# Patient Record
Sex: MALE | Race: WHITE | NOT HISPANIC OR LATINO | URBAN - METROPOLITAN AREA
[De-identification: names, ages, dates, MRNs, and addresses within clinical notes are randomized per-mention and may not be internally consistent; named-entity substitution may affect disease eponyms.]

---

## 2023-01-01 ENCOUNTER — APPOINTMENT (OUTPATIENT)
Dept: GENERAL RADIOLOGY | Facility: HOSPITAL | Age: 88
DRG: 444 | End: 2023-01-01
Payer: MEDICARE

## 2023-01-01 ENCOUNTER — ANESTHESIA EVENT (OUTPATIENT)
Dept: GASTROENTEROLOGY | Facility: HOSPITAL | Age: 88
DRG: 444 | End: 2023-01-01
Payer: MEDICARE

## 2023-01-01 ENCOUNTER — APPOINTMENT (OUTPATIENT)
Dept: CARDIOLOGY | Facility: HOSPITAL | Age: 88
DRG: 444 | End: 2023-01-01
Payer: MEDICARE

## 2023-01-01 ENCOUNTER — INPATIENT HOSPITAL (OUTPATIENT)
Dept: URBAN - METROPOLITAN AREA HOSPITAL 84 | Facility: HOSPITAL | Age: 88
End: 2023-01-01

## 2023-01-01 ENCOUNTER — APPOINTMENT (OUTPATIENT)
Dept: OTHER | Facility: HOSPITAL | Age: 88
DRG: 444 | End: 2023-01-01
Payer: MEDICARE

## 2023-01-01 ENCOUNTER — APPOINTMENT (OUTPATIENT)
Dept: MRI IMAGING | Facility: HOSPITAL | Age: 88
DRG: 444 | End: 2023-01-01
Payer: MEDICARE

## 2023-01-01 ENCOUNTER — APPOINTMENT (OUTPATIENT)
Dept: ULTRASOUND IMAGING | Facility: HOSPITAL | Age: 88
DRG: 444 | End: 2023-01-01
Payer: MEDICARE

## 2023-01-01 ENCOUNTER — ON CAMPUS - OUTPATIENT (OUTPATIENT)
Dept: URBAN - METROPOLITAN AREA HOSPITAL 85 | Facility: HOSPITAL | Age: 88
End: 2023-01-01

## 2023-01-01 ENCOUNTER — ANESTHESIA (OUTPATIENT)
Dept: GASTROENTEROLOGY | Facility: HOSPITAL | Age: 88
DRG: 444 | End: 2023-01-01
Payer: MEDICARE

## 2023-01-01 ENCOUNTER — HOSPITAL ENCOUNTER (INPATIENT)
Facility: HOSPITAL | Age: 88
LOS: 5 days | DRG: 444 | End: 2023-09-23
Attending: HOSPITALIST | Admitting: HOSPITALIST
Payer: MEDICARE

## 2023-01-01 VITALS
DIASTOLIC BLOOD PRESSURE: 74 MMHG | SYSTOLIC BLOOD PRESSURE: 134 MMHG | BODY MASS INDEX: 26.22 KG/M2 | OXYGEN SATURATION: 91 % | TEMPERATURE: 97.4 F | HEART RATE: 100 BPM | HEIGHT: 66 IN | WEIGHT: 163.14 LBS | RESPIRATION RATE: 17 BRPM

## 2023-01-01 DIAGNOSIS — R94.5 ABNORMAL RESULTS OF LIVER FUNCTION STUDIES: ICD-10-CM

## 2023-01-01 DIAGNOSIS — K44.9 DIAPHRAGMATIC HERNIA WITHOUT OBSTRUCTION OR GANGRENE: ICD-10-CM

## 2023-01-01 DIAGNOSIS — K80.50 CHOLEDOCHOLITHIASIS: Primary | ICD-10-CM

## 2023-01-01 DIAGNOSIS — K80.50 CALCULUS OF BILE DUCT WITHOUT CHOLANGITIS OR CHOLECYSTITIS W: ICD-10-CM

## 2023-01-01 DIAGNOSIS — R11.2 NAUSEA WITH VOMITING, UNSPECIFIED: ICD-10-CM

## 2023-01-01 DIAGNOSIS — R10.13 EPIGASTRIC PAIN: ICD-10-CM

## 2023-01-01 DIAGNOSIS — D64.9 ANEMIA, UNSPECIFIED: ICD-10-CM

## 2023-01-01 DIAGNOSIS — K85.90 ACUTE PANCREATITIS WITHOUT NECROSIS OR INFECTION, UNSPECIFIE: ICD-10-CM

## 2023-01-01 DIAGNOSIS — D53.9 NUTRITIONAL ANEMIA, UNSPECIFIED: ICD-10-CM

## 2023-01-01 DIAGNOSIS — G20 PARKINSON'S DISEASE: ICD-10-CM

## 2023-01-01 DIAGNOSIS — R74.01 ELEVATION OF LEVELS OF LIVER TRANSAMINASE LEVELS: ICD-10-CM

## 2023-01-01 DIAGNOSIS — K85.10 ACUTE BILIARY PANCREATITIS, UNSPECIFIED COMPLICATION STATUS: ICD-10-CM

## 2023-01-01 LAB
ADV 40+41 DNA STL QL NAA+NON-PROBE: NOT DETECTED
ALBUMIN SERPL ELPH-MCNC: 2.6 G/DL (ref 2.9–4.4)
ALBUMIN SERPL-MCNC: 2.3 G/DL (ref 3.5–5.2)
ALBUMIN SERPL-MCNC: 2.4 G/DL (ref 3.5–5.2)
ALBUMIN SERPL-MCNC: 2.6 G/DL (ref 3.5–5.2)
ALBUMIN SERPL-MCNC: 3.2 G/DL (ref 3.5–5.2)
ALBUMIN SERPL-MCNC: 3.3 G/DL (ref 3.5–5.2)
ALBUMIN SERPL-MCNC: 3.5 G/DL (ref 3.5–5.2)
ALBUMIN/GLOB SERPL: 0.7 G/DL
ALBUMIN/GLOB SERPL: 0.9 G/DL
ALBUMIN/GLOB SERPL: 0.9 {RATIO} (ref 0.7–1.7)
ALBUMIN/GLOB SERPL: 1.1 G/DL
ALBUMIN/GLOB SERPL: 1.4 G/DL
ALP SERPL-CCNC: 46 U/L (ref 39–117)
ALP SERPL-CCNC: 55 U/L (ref 39–117)
ALP SERPL-CCNC: 65 U/L (ref 39–117)
ALP SERPL-CCNC: 69 U/L (ref 39–117)
ALP SERPL-CCNC: 70 U/L (ref 39–117)
ALP SERPL-CCNC: 86 U/L (ref 39–117)
ALPHA1 GLOB SERPL ELPH-MCNC: 0.4 G/DL (ref 0–0.4)
ALPHA2 GLOB SERPL ELPH-MCNC: 0.9 G/DL (ref 0.4–1)
ALT SERPL W P-5'-P-CCNC: 102 U/L (ref 1–41)
ALT SERPL W P-5'-P-CCNC: 125 U/L (ref 1–41)
ALT SERPL W P-5'-P-CCNC: 41 U/L (ref 1–41)
ALT SERPL W P-5'-P-CCNC: 62 U/L (ref 1–41)
ALT SERPL W P-5'-P-CCNC: 77 U/L (ref 1–41)
ALT SERPL W P-5'-P-CCNC: 87 U/L (ref 1–41)
ANION GAP SERPL CALCULATED.3IONS-SCNC: 11 MMOL/L (ref 5–15)
ANION GAP SERPL CALCULATED.3IONS-SCNC: 12 MMOL/L (ref 5–15)
ANION GAP SERPL CALCULATED.3IONS-SCNC: 13 MMOL/L (ref 5–15)
ANION GAP SERPL CALCULATED.3IONS-SCNC: 15 MMOL/L (ref 5–15)
ANION GAP SERPL CALCULATED.3IONS-SCNC: 16 MMOL/L (ref 5–15)
ANION GAP SERPL CALCULATED.3IONS-SCNC: 20 MMOL/L (ref 5–15)
ANISOCYTOSIS BLD QL: ABNORMAL
ANISOCYTOSIS BLD QL: ABNORMAL
AST SERPL-CCNC: 16 U/L (ref 1–40)
AST SERPL-CCNC: 172 U/L (ref 1–40)
AST SERPL-CCNC: 177 U/L (ref 1–40)
AST SERPL-CCNC: 24 U/L (ref 1–40)
AST SERPL-CCNC: 48 U/L (ref 1–40)
AST SERPL-CCNC: 95 U/L (ref 1–40)
ASTRO TYP 1-8 RNA STL QL NAA+NON-PROBE: NOT DETECTED
B-GLOBULIN SERPL ELPH-MCNC: 0.7 G/DL (ref 0.7–1.3)
BACTERIA ISLT: NORMAL
BACTERIA SPEC AEROBE CULT: ABNORMAL
BACTERIA UR QL AUTO: ABNORMAL /HPF
BASOPHILS # BLD AUTO: 0 10*3/MM3 (ref 0–0.2)
BASOPHILS NFR BLD AUTO: 0.1 % (ref 0–1.5)
BILIRUB SERPL-MCNC: 0.3 MG/DL (ref 0–1.2)
BILIRUB SERPL-MCNC: 0.4 MG/DL (ref 0–1.2)
BILIRUB SERPL-MCNC: 0.4 MG/DL (ref 0–1.2)
BILIRUB SERPL-MCNC: 0.6 MG/DL (ref 0–1.2)
BILIRUB SERPL-MCNC: 0.7 MG/DL (ref 0–1.2)
BILIRUB SERPL-MCNC: 0.9 MG/DL (ref 0–1.2)
BILIRUB UR QL STRIP: NEGATIVE
BUN SERPL-MCNC: 47 MG/DL (ref 8–23)
BUN SERPL-MCNC: 48 MG/DL (ref 8–23)
BUN SERPL-MCNC: 61 MG/DL (ref 8–23)
BUN SERPL-MCNC: 72 MG/DL (ref 8–23)
BUN SERPL-MCNC: 75 MG/DL (ref 8–23)
BUN SERPL-MCNC: 85 MG/DL (ref 8–23)
BUN/CREAT SERPL: 28.2 (ref 7–25)
BUN/CREAT SERPL: 29.2 (ref 7–25)
BUN/CREAT SERPL: 30.9 (ref 7–25)
BUN/CREAT SERPL: 31.2 (ref 7–25)
BUN/CREAT SERPL: 34.4 (ref 7–25)
BUN/CREAT SERPL: 37.3 (ref 7–25)
BURR CELLS BLD QL SMEAR: ABNORMAL
C CAYETANENSIS DNA STL QL NAA+NON-PROBE: NOT DETECTED
C COLI+JEJ+UPSA DNA STL QL NAA+NON-PROBE: NOT DETECTED
C DIFF GDH + TOXINS A+B STL QL IA.RAPID: NEGATIVE
C DIFF GDH + TOXINS A+B STL QL IA.RAPID: NEGATIVE
CA-I SERPL ISE-MCNC: 1.14 MMOL/L (ref 1.2–1.3)
CA-I SERPL ISE-MCNC: 1.2 MMOL/L (ref 1.2–1.3)
CALCIUM SPEC-SCNC: 7.4 MG/DL (ref 8.6–10.5)
CALCIUM SPEC-SCNC: 7.5 MG/DL (ref 8.6–10.5)
CALCIUM SPEC-SCNC: 7.7 MG/DL (ref 8.6–10.5)
CALCIUM SPEC-SCNC: 8.4 MG/DL (ref 8.6–10.5)
CALCIUM SPEC-SCNC: 8.9 MG/DL (ref 8.6–10.5)
CALCIUM SPEC-SCNC: 9 MG/DL (ref 8.6–10.5)
CHLORIDE SERPL-SCNC: 101 MMOL/L (ref 98–107)
CHLORIDE SERPL-SCNC: 110 MMOL/L (ref 98–107)
CHLORIDE SERPL-SCNC: 111 MMOL/L (ref 98–107)
CHLORIDE SERPL-SCNC: 118 MMOL/L (ref 98–107)
CHLORIDE SERPL-SCNC: 119 MMOL/L (ref 98–107)
CHLORIDE SERPL-SCNC: 120 MMOL/L (ref 98–107)
CK SERPL-CCNC: 248 U/L (ref 20–200)
CLARITY UR: ABNORMAL
CO2 SERPL-SCNC: 18 MMOL/L (ref 22–29)
CO2 SERPL-SCNC: 19 MMOL/L (ref 22–29)
CO2 SERPL-SCNC: 20 MMOL/L (ref 22–29)
CO2 SERPL-SCNC: 24 MMOL/L (ref 22–29)
COLOR UR: ABNORMAL
CREAT SERPL-MCNC: 1.26 MG/DL (ref 0.76–1.27)
CREAT SERPL-MCNC: 1.54 MG/DL (ref 0.76–1.27)
CREAT SERPL-MCNC: 2.09 MG/DL (ref 0.76–1.27)
CREAT SERPL-MCNC: 2.09 MG/DL (ref 0.76–1.27)
CREAT SERPL-MCNC: 2.66 MG/DL (ref 0.76–1.27)
CREAT SERPL-MCNC: 2.75 MG/DL (ref 0.76–1.27)
CRP SERPL-MCNC: 22.22 MG/DL (ref 0–0.5)
CRP SERPL-MCNC: 34.33 MG/DL (ref 0–0.5)
CRP SERPL-MCNC: 5.55 MG/DL (ref 0–0.5)
CRYPTOSP DNA STL QL NAA+NON-PROBE: NOT DETECTED
D-LACTATE SERPL-SCNC: 1.6 MMOL/L (ref 0.5–2)
D-LACTATE SERPL-SCNC: 1.6 MMOL/L (ref 0.5–2)
D-LACTATE SERPL-SCNC: 2.2 MMOL/L (ref 0.5–2)
D-LACTATE SERPL-SCNC: 2.2 MMOL/L (ref 0.5–2)
D-LACTATE SERPL-SCNC: 2.3 MMOL/L (ref 0.5–2)
DEPRECATED RDW RBC AUTO: 53.8 FL (ref 37–54)
DEPRECATED RDW RBC AUTO: 54.3 FL (ref 37–54)
DEPRECATED RDW RBC AUTO: 54.7 FL (ref 37–54)
DEPRECATED RDW RBC AUTO: 56.9 FL (ref 37–54)
DEPRECATED RDW RBC AUTO: 57.3 FL (ref 37–54)
DEPRECATED RDW RBC AUTO: 58.6 FL (ref 37–54)
DOHLE BODIES: PRESENT
E HISTOLYT DNA STL QL NAA+NON-PROBE: NOT DETECTED
EAEC PAA PLAS AGGR+AATA ST NAA+NON-PRB: NOT DETECTED
EC STX1+STX2 GENES STL QL NAA+NON-PROBE: NOT DETECTED
EGFRCR SERPLBLD CKD-EPI 2021: 21.5 ML/MIN/1.73
EGFRCR SERPLBLD CKD-EPI 2021: 22.4 ML/MIN/1.73
EGFRCR SERPLBLD CKD-EPI 2021: 29.9 ML/MIN/1.73
EGFRCR SERPLBLD CKD-EPI 2021: 29.9 ML/MIN/1.73
EGFRCR SERPLBLD CKD-EPI 2021: 43.1 ML/MIN/1.73
EGFRCR SERPLBLD CKD-EPI 2021: 54.9 ML/MIN/1.73
EOSINOPHIL # BLD AUTO: 0 10*3/MM3 (ref 0–0.4)
EOSINOPHIL NFR BLD AUTO: 0 % (ref 0.3–6.2)
EOSINOPHIL NFR BLD AUTO: 0 % (ref 0.3–6.2)
EOSINOPHIL NFR BLD AUTO: 0.1 % (ref 0.3–6.2)
EPEC EAE GENE STL QL NAA+NON-PROBE: NOT DETECTED
ERYTHROCYTE [DISTWIDTH] IN BLOOD BY AUTOMATED COUNT: 14.1 % (ref 12.3–15.4)
ERYTHROCYTE [DISTWIDTH] IN BLOOD BY AUTOMATED COUNT: 14.7 % (ref 12.3–15.4)
ERYTHROCYTE [DISTWIDTH] IN BLOOD BY AUTOMATED COUNT: 14.9 % (ref 12.3–15.4)
ERYTHROCYTE [DISTWIDTH] IN BLOOD BY AUTOMATED COUNT: 15.1 % (ref 12.3–15.4)
ERYTHROCYTE [DISTWIDTH] IN BLOOD BY AUTOMATED COUNT: 15.2 % (ref 12.3–15.4)
ERYTHROCYTE [DISTWIDTH] IN BLOOD BY AUTOMATED COUNT: 15.2 % (ref 12.3–15.4)
ERYTHROCYTE [SEDIMENTATION RATE] IN BLOOD: 14 MM/HR (ref 0–20)
ETEC LTA+ST1A+ST1B TOX ST NAA+NON-PROBE: NOT DETECTED
FERRITIN SERPL-MCNC: 171.7 NG/ML (ref 30–400)
FOLATE SERPL-MCNC: >20 NG/ML (ref 4.78–24.2)
G LAMBLIA DNA STL QL NAA+NON-PROBE: NOT DETECTED
GAMMA GLOB SERPL ELPH-MCNC: 0.8 G/DL (ref 0.4–1.8)
GLOBULIN SER CALC-MCNC: 2.8 G/DL (ref 2.2–3.9)
GLOBULIN UR ELPH-MCNC: 2.3 GM/DL
GLOBULIN UR ELPH-MCNC: 2.5 GM/DL
GLOBULIN UR ELPH-MCNC: 2.8 GM/DL
GLOBULIN UR ELPH-MCNC: 3.1 GM/DL
GLUCOSE BLDC GLUCOMTR-MCNC: 129 MG/DL (ref 70–105)
GLUCOSE BLDC GLUCOMTR-MCNC: 179 MG/DL (ref 70–105)
GLUCOSE BLDC GLUCOMTR-MCNC: 210 MG/DL (ref 70–105)
GLUCOSE BLDC GLUCOMTR-MCNC: 213 MG/DL (ref 70–105)
GLUCOSE BLDC GLUCOMTR-MCNC: 217 MG/DL (ref 70–105)
GLUCOSE BLDC GLUCOMTR-MCNC: 234 MG/DL (ref 70–105)
GLUCOSE BLDC GLUCOMTR-MCNC: 254 MG/DL (ref 70–105)
GLUCOSE SERPL-MCNC: 127 MG/DL (ref 65–99)
GLUCOSE SERPL-MCNC: 159 MG/DL (ref 65–99)
GLUCOSE SERPL-MCNC: 165 MG/DL (ref 65–99)
GLUCOSE SERPL-MCNC: 229 MG/DL (ref 65–99)
GLUCOSE SERPL-MCNC: 237 MG/DL (ref 65–99)
GLUCOSE SERPL-MCNC: 253 MG/DL (ref 65–99)
GLUCOSE UR STRIP-MCNC: ABNORMAL MG/DL
HCT VFR BLD AUTO: 26.2 % (ref 37.5–51)
HCT VFR BLD AUTO: 27.4 % (ref 37.5–51)
HCT VFR BLD AUTO: 30.8 % (ref 37.5–51)
HCT VFR BLD AUTO: 31.4 % (ref 37.5–51)
HCT VFR BLD AUTO: 32.3 % (ref 37.5–51)
HCT VFR BLD AUTO: 37.6 % (ref 37.5–51)
HGB BLD-MCNC: 10 G/DL (ref 13–17.7)
HGB BLD-MCNC: 10.4 G/DL (ref 13–17.7)
HGB BLD-MCNC: 10.9 G/DL (ref 13–17.7)
HGB BLD-MCNC: 12.6 G/DL (ref 13–17.7)
HGB BLD-MCNC: 8.7 G/DL (ref 13–17.7)
HGB BLD-MCNC: 9.1 G/DL (ref 13–17.7)
HGB UR QL STRIP.AUTO: ABNORMAL
HYALINE CASTS UR QL AUTO: ABNORMAL /LPF
INR PPP: 1.05 (ref 0.93–1.1)
INR PPP: 1.41 (ref 0.93–1.1)
IRON 24H UR-MRATE: 12 MCG/DL (ref 59–158)
IRON SATN MFR SERPL: 6 % (ref 20–50)
KETONES UR QL STRIP: NEGATIVE
LAB AP CASE REPORT: NORMAL
LABORATORY COMMENT REPORT: ABNORMAL
LARGE PLATELETS: ABNORMAL
LARGE PLATELETS: ABNORMAL
LEUKOCYTE ESTERASE UR QL STRIP.AUTO: ABNORMAL
LIPASE SERPL-CCNC: 106 U/L (ref 13–60)
LIPASE SERPL-CCNC: 25 U/L (ref 13–60)
LIPASE SERPL-CCNC: 474 U/L (ref 13–60)
LYMPHOCYTES # BLD AUTO: 0.1 10*3/MM3 (ref 0.7–3.1)
LYMPHOCYTES # BLD AUTO: 0.2 10*3/MM3 (ref 0.7–3.1)
LYMPHOCYTES # BLD AUTO: 0.4 10*3/MM3 (ref 0.7–3.1)
LYMPHOCYTES # BLD MANUAL: 0.18 10*3/MM3 (ref 0.7–3.1)
LYMPHOCYTES # BLD MANUAL: 0.21 10*3/MM3 (ref 0.7–3.1)
LYMPHOCYTES # BLD MANUAL: 0.71 10*3/MM3 (ref 0.7–3.1)
LYMPHOCYTES NFR BLD AUTO: 2.4 % (ref 19.6–45.3)
LYMPHOCYTES NFR BLD AUTO: 2.6 % (ref 19.6–45.3)
LYMPHOCYTES NFR BLD AUTO: 3.2 % (ref 19.6–45.3)
LYMPHOCYTES NFR BLD MANUAL: 2 % (ref 5–12)
LYMPHOCYTES NFR BLD MANUAL: 6 % (ref 5–12)
M PROTEIN SERPL ELPH-MCNC: ABNORMAL G/DL
MACROCYTES BLD QL SMEAR: ABNORMAL
MAGNESIUM SERPL-MCNC: 2.2 MG/DL (ref 1.6–2.4)
MAGNESIUM SERPL-MCNC: 2.7 MG/DL (ref 1.6–2.4)
MCH RBC QN AUTO: 33.5 PG (ref 26.6–33)
MCH RBC QN AUTO: 33.7 PG (ref 26.6–33)
MCH RBC QN AUTO: 34 PG (ref 26.6–33)
MCH RBC QN AUTO: 34.3 PG (ref 26.6–33)
MCH RBC QN AUTO: 34.9 PG (ref 26.6–33)
MCH RBC QN AUTO: 35.2 PG (ref 26.6–33)
MCHC RBC AUTO-ENTMCNC: 32.5 G/DL (ref 31.5–35.7)
MCHC RBC AUTO-ENTMCNC: 33 G/DL (ref 31.5–35.7)
MCHC RBC AUTO-ENTMCNC: 33.1 G/DL (ref 31.5–35.7)
MCHC RBC AUTO-ENTMCNC: 33.3 G/DL (ref 31.5–35.7)
MCHC RBC AUTO-ENTMCNC: 33.5 G/DL (ref 31.5–35.7)
MCHC RBC AUTO-ENTMCNC: 33.7 G/DL (ref 31.5–35.7)
MCV RBC AUTO: 101.3 FL (ref 79–97)
MCV RBC AUTO: 102.1 FL (ref 79–97)
MCV RBC AUTO: 103.5 FL (ref 79–97)
MCV RBC AUTO: 104.1 FL (ref 79–97)
MCV RBC AUTO: 104.2 FL (ref 79–97)
MCV RBC AUTO: 104.3 FL (ref 79–97)
METAMYELOCYTES NFR BLD MANUAL: 1 % (ref 0–0)
METAMYELOCYTES NFR BLD MANUAL: 19 % (ref 0–0)
MONOCYTES # BLD AUTO: 0.1 10*3/MM3 (ref 0.1–0.9)
MONOCYTES # BLD AUTO: 0.4 10*3/MM3 (ref 0.1–0.9)
MONOCYTES # BLD AUTO: 0.4 10*3/MM3 (ref 0.1–0.9)
MONOCYTES # BLD: 0.18 10*3/MM3 (ref 0.1–0.9)
MONOCYTES # BLD: 0.61 10*3/MM3 (ref 0.1–0.9)
MONOCYTES NFR BLD AUTO: 1.9 % (ref 5–12)
MONOCYTES NFR BLD AUTO: 3.6 % (ref 5–12)
MONOCYTES NFR BLD AUTO: 5.4 % (ref 5–12)
MYELOCYTES NFR BLD MANUAL: 1 % (ref 0–0)
MYELOCYTES NFR BLD MANUAL: 5 % (ref 0–0)
NEUTROPHILS # BLD AUTO: 7.84 10*3/MM3 (ref 1.7–7)
NEUTROPHILS # BLD AUTO: 8.64 10*3/MM3 (ref 1.7–7)
NEUTROPHILS # BLD AUTO: 8.67 10*3/MM3 (ref 1.7–7)
NEUTROPHILS NFR BLD AUTO: 10.4 10*3/MM3 (ref 1.7–7)
NEUTROPHILS NFR BLD AUTO: 2.6 10*3/MM3 (ref 1.7–7)
NEUTROPHILS NFR BLD AUTO: 7.6 10*3/MM3 (ref 1.7–7)
NEUTROPHILS NFR BLD AUTO: 91.9 % (ref 42.7–76)
NEUTROPHILS NFR BLD AUTO: 93 % (ref 42.7–76)
NEUTROPHILS NFR BLD AUTO: 95.6 % (ref 42.7–76)
NEUTROPHILS NFR BLD MANUAL: 40 % (ref 42.7–76)
NEUTROPHILS NFR BLD MANUAL: 60 % (ref 42.7–76)
NEUTROPHILS NFR BLD MANUAL: 93 % (ref 42.7–76)
NEUTS BAND NFR BLD MANUAL: 25 % (ref 0–5)
NEUTS BAND NFR BLD MANUAL: 3 % (ref 0–5)
NEUTS BAND NFR BLD MANUAL: 34 % (ref 0–5)
NEUTS VAC BLD QL SMEAR: ABNORMAL
NITRITE UR QL STRIP: POSITIVE
NOROVIRUS GI+II RNA STL QL NAA+NON-PROBE: NOT DETECTED
NRBC BLD AUTO-RTO: 0.1 /100 WBC (ref 0–0.2)
NRBC BLD AUTO-RTO: 0.1 /100 WBC (ref 0–0.2)
NRBC BLD AUTO-RTO: 0.2 /100 WBC (ref 0–0.2)
OVALOCYTES BLD QL SMEAR: ABNORMAL
P SHIGELLOIDES DNA STL QL NAA+NON-PROBE: NOT DETECTED
PATH REPORT.FINAL DX SPEC: NORMAL
PATHOLOGY REVIEW: YES
PH UR STRIP.AUTO: <=5 [PH] (ref 5–8)
PHOSPHATE SERPL-MCNC: 2 MG/DL (ref 2.5–4.5)
PHOSPHATE SERPL-MCNC: 3.3 MG/DL (ref 2.5–4.5)
PLAT MORPH BLD: NORMAL
PLATELET # BLD AUTO: 108 10*3/MM3 (ref 140–450)
PLATELET # BLD AUTO: 150 10*3/MM3 (ref 140–450)
PLATELET # BLD AUTO: 68 10*3/MM3 (ref 140–450)
PLATELET # BLD AUTO: 69 10*3/MM3 (ref 140–450)
PLATELET # BLD AUTO: 76 10*3/MM3 (ref 140–450)
PLATELET # BLD AUTO: 95 10*3/MM3 (ref 140–450)
PMV BLD AUTO: 10.2 FL (ref 6–12)
PMV BLD AUTO: 10.5 FL (ref 6–12)
PMV BLD AUTO: 8 FL (ref 6–12)
PMV BLD AUTO: 8.8 FL (ref 6–12)
PMV BLD AUTO: 9.4 FL (ref 6–12)
PMV BLD AUTO: 9.8 FL (ref 6–12)
POIKILOCYTOSIS BLD QL SMEAR: ABNORMAL
POIKILOCYTOSIS BLD QL SMEAR: ABNORMAL
POLYCHROMASIA BLD QL SMEAR: ABNORMAL
POTASSIUM SERPL-SCNC: 2.9 MMOL/L (ref 3.5–5.2)
POTASSIUM SERPL-SCNC: 3.5 MMOL/L (ref 3.5–5.2)
POTASSIUM SERPL-SCNC: 3.7 MMOL/L (ref 3.5–5.2)
POTASSIUM SERPL-SCNC: 3.9 MMOL/L (ref 3.5–5.2)
POTASSIUM SERPL-SCNC: 4.4 MMOL/L (ref 3.5–5.2)
POTASSIUM SERPL-SCNC: 4.7 MMOL/L (ref 3.5–5.2)
PROT PATTERN SERPL ELPH-IMP: ABNORMAL
PROT SERPL-MCNC: 4.9 G/DL (ref 6–8.5)
PROT SERPL-MCNC: 5.2 G/DL (ref 6–8.5)
PROT SERPL-MCNC: 5.4 G/DL (ref 6–8.5)
PROT SERPL-MCNC: 5.4 G/DL (ref 6–8.5)
PROT SERPL-MCNC: 5.5 G/DL (ref 6–8.5)
PROT SERPL-MCNC: 5.6 G/DL (ref 6–8.5)
PROT SERPL-MCNC: 6 G/DL (ref 6–8.5)
PROT UR QL STRIP: ABNORMAL
PROTHROMBIN TIME: 11.2 SECONDS (ref 9.6–11.7)
PROTHROMBIN TIME: 14.8 SECONDS (ref 9.6–11.7)
PTH-INTACT SERPL-MCNC: 116.6 PG/ML (ref 15–65)
QT INTERVAL: 341 MS
QT INTERVAL: 375 MS
QT INTERVAL: 388 MS
QT INTERVAL: 403 MS
QT INTERVAL: 472 MS
QTC INTERVAL: 475 MS
QTC INTERVAL: 487 MS
QTC INTERVAL: 492 MS
QTC INTERVAL: 504 MS
QTC INTERVAL: 518 MS
RBC # BLD AUTO: 2.57 10*6/MM3 (ref 4.14–5.8)
RBC # BLD AUTO: 2.7 10*6/MM3 (ref 4.14–5.8)
RBC # BLD AUTO: 2.97 10*6/MM3 (ref 4.14–5.8)
RBC # BLD AUTO: 3.02 10*6/MM3 (ref 4.14–5.8)
RBC # BLD AUTO: 3.1 10*6/MM3 (ref 4.14–5.8)
RBC # BLD AUTO: 3.6 10*6/MM3 (ref 4.14–5.8)
RBC # UR STRIP: ABNORMAL /HPF
RBC MORPH BLD: NORMAL
REF LAB TEST METHOD: ABNORMAL
RVA RNA STL QL NAA+NON-PROBE: NOT DETECTED
S ENT+BONG DNA STL QL NAA+NON-PROBE: NOT DETECTED
SAPO I+II+IV+V RNA STL QL NAA+NON-PROBE: NOT DETECTED
SCAN SLIDE: NORMAL
SHIGELLA SP+EIEC IPAH ST NAA+NON-PROBE: NOT DETECTED
SODIUM SERPL-SCNC: 140 MMOL/L (ref 136–145)
SODIUM SERPL-SCNC: 143 MMOL/L (ref 136–145)
SODIUM SERPL-SCNC: 145 MMOL/L (ref 136–145)
SODIUM SERPL-SCNC: 149 MMOL/L (ref 136–145)
SODIUM SERPL-SCNC: 153 MMOL/L (ref 136–145)
SODIUM SERPL-SCNC: 155 MMOL/L (ref 136–145)
SODIUM UR-SCNC: 175 MMOL/L
SP GR UR STRIP: 1.04 (ref 1–1.03)
SQUAMOUS #/AREA URNS HPF: ABNORMAL /HPF
TIBC SERPL-MCNC: 215 MCG/DL (ref 298–536)
TOXIC GRANULATION: ABNORMAL
TRANSFERRIN SERPL-MCNC: 144 MG/DL (ref 200–360)
TSH SERPL DL<=0.05 MIU/L-ACNC: 3.05 UIU/ML (ref 0.27–4.2)
URATE SERPL-MCNC: 7.1 MG/DL (ref 3.4–7)
UROBILINOGEN UR QL STRIP: ABNORMAL
V CHOL+PARA+VUL DNA STL QL NAA+NON-PROBE: NOT DETECTED
V CHOLERAE DNA STL QL NAA+NON-PROBE: NOT DETECTED
VARIANT LYMPHS NFR BLD MANUAL: 0 % (ref 19.6–45.3)
VARIANT LYMPHS NFR BLD MANUAL: 2 % (ref 0–5)
VARIANT LYMPHS NFR BLD MANUAL: 2 % (ref 19.6–45.3)
VARIANT LYMPHS NFR BLD MANUAL: 7 % (ref 19.6–45.3)
VIT B12 BLD-MCNC: >2000 PG/ML (ref 211–946)
WBC # UR STRIP: ABNORMAL /HPF
WBC MORPH BLD: NORMAL
WBC MORPH BLD: NORMAL
WBC NRBC COR # BLD: 10.2 10*3/MM3 (ref 3.4–10.8)
WBC NRBC COR # BLD: 10.6 10*3/MM3 (ref 3.4–10.8)
WBC NRBC COR # BLD: 11.2 10*3/MM3 (ref 3.4–10.8)
WBC NRBC COR # BLD: 2.8 10*3/MM3 (ref 3.4–10.8)
WBC NRBC COR # BLD: 8.2 10*3/MM3 (ref 3.4–10.8)
WBC NRBC COR # BLD: 9 10*3/MM3 (ref 3.4–10.8)
Y ENTEROCOL DNA STL QL NAA+NON-PROBE: NOT DETECTED

## 2023-01-01 PROCEDURE — 99232 SBSQ HOSP IP/OBS MODERATE 35: CPT | Performed by: SURGERY

## 2023-01-01 PROCEDURE — C2625 STENT, NON-COR, TEM W/DEL SY: HCPCS | Performed by: INTERNAL MEDICINE

## 2023-01-01 PROCEDURE — 93005 ELECTROCARDIOGRAM TRACING: CPT | Performed by: HOSPITALIST

## 2023-01-01 PROCEDURE — C1769 GUIDE WIRE: HCPCS | Performed by: INTERNAL MEDICINE

## 2023-01-01 PROCEDURE — 99205 OFFICE O/P NEW HI 60 MIN: CPT | Performed by: INTERNAL MEDICINE

## 2023-01-01 PROCEDURE — 86140 C-REACTIVE PROTEIN: CPT | Performed by: NURSE PRACTITIONER

## 2023-01-01 PROCEDURE — 80053 COMPREHEN METABOLIC PANEL: CPT | Performed by: INTERNAL MEDICINE

## 2023-01-01 PROCEDURE — 82948 REAGENT STRIP/BLOOD GLUCOSE: CPT

## 2023-01-01 PROCEDURE — 25010000002 METRONIDAZOLE 500 MG/100ML SOLUTION: Performed by: INTERNAL MEDICINE

## 2023-01-01 PROCEDURE — 25010000002 SUGAMMADEX 200 MG/2ML SOLUTION: Performed by: NURSE ANESTHETIST, CERTIFIED REGISTERED

## 2023-01-01 PROCEDURE — 25010000002 PIPERACILLIN SOD-TAZOBACTAM PER 1 G: Performed by: NURSE PRACTITIONER

## 2023-01-01 PROCEDURE — 25010000002 CEFTRIAXONE PER 250 MG: Performed by: HOSPITALIST

## 2023-01-01 PROCEDURE — 87186 SC STD MICRODIL/AGAR DIL: CPT | Performed by: NURSE PRACTITIONER

## 2023-01-01 PROCEDURE — 99222 1ST HOSP IP/OBS MODERATE 55: CPT | Mod: FS,25 | Performed by: NURSE PRACTITIONER

## 2023-01-01 PROCEDURE — 25010000002 AMIODARONE IN DEXTROSE 5% 360-4.14 MG/200ML-% SOLUTION: Performed by: INTERNAL MEDICINE

## 2023-01-01 PROCEDURE — 25010000002 FUROSEMIDE PER 20 MG: Performed by: HOSPITALIST

## 2023-01-01 PROCEDURE — 25010000002 GLUCAGON (RDNA) PER 1 MG: Performed by: NURSE ANESTHETIST, CERTIFIED REGISTERED

## 2023-01-01 PROCEDURE — 25010000002 CALCIUM GLUCONATE-NACL 1-0.675 GM/50ML-% SOLUTION: Performed by: INTERNAL MEDICINE

## 2023-01-01 PROCEDURE — 93010 ELECTROCARDIOGRAM REPORT: CPT | Performed by: INTERNAL MEDICINE

## 2023-01-01 PROCEDURE — 85025 COMPLETE CBC W/AUTO DIFF WBC: CPT | Performed by: NURSE PRACTITIONER

## 2023-01-01 PROCEDURE — 25010000002 PIPERACILLIN SOD-TAZOBACTAM PER 1 G: Performed by: INTERNAL MEDICINE

## 2023-01-01 PROCEDURE — 83540 ASSAY OF IRON: CPT | Performed by: INTERNAL MEDICINE

## 2023-01-01 PROCEDURE — 83735 ASSAY OF MAGNESIUM: CPT | Performed by: INTERNAL MEDICINE

## 2023-01-01 PROCEDURE — 94799 UNLISTED PULMONARY SVC/PX: CPT

## 2023-01-01 PROCEDURE — 0DJ08ZZ INSPECTION OF UPPER INTESTINAL TRACT, VIA NATURAL OR ARTIFICIAL OPENING ENDOSCOPIC: ICD-10-PCS | Performed by: INTERNAL MEDICINE

## 2023-01-01 PROCEDURE — 25810000003 DEXTROSE 5 % WITH KCL 20 MEQ 20 MEQ/L SOLUTION: Performed by: INTERNAL MEDICINE

## 2023-01-01 PROCEDURE — 99222 1ST HOSP IP/OBS MODERATE 55: CPT | Mod: 25,FS | Performed by: NURSE PRACTITIONER

## 2023-01-01 PROCEDURE — 43262 ENDO CHOLANGIOPANCREATOGRAPH: CPT | Mod: 59 | Performed by: INTERNAL MEDICINE

## 2023-01-01 PROCEDURE — 82607 VITAMIN B-12: CPT | Performed by: INTERNAL MEDICINE

## 2023-01-01 PROCEDURE — 87507 IADNA-DNA/RNA PROBE TQ 12-25: CPT | Performed by: NURSE PRACTITIONER

## 2023-01-01 PROCEDURE — 84443 ASSAY THYROID STIM HORMONE: CPT | Performed by: INTERNAL MEDICINE

## 2023-01-01 PROCEDURE — 83605 ASSAY OF LACTIC ACID: CPT | Performed by: INTERNAL MEDICINE

## 2023-01-01 PROCEDURE — 63710000001 INSULIN LISPRO (HUMAN) PER 5 UNITS: Performed by: INTERNAL MEDICINE

## 2023-01-01 PROCEDURE — 99232 SBSQ HOSP IP/OBS MODERATE 35: CPT | Mod: FS | Performed by: NURSE PRACTITIONER

## 2023-01-01 PROCEDURE — 25010000002 HALOPERIDOL LACTATE PER 5 MG: Performed by: STUDENT IN AN ORGANIZED HEALTH CARE EDUCATION/TRAINING PROGRAM

## 2023-01-01 PROCEDURE — 25010000002 PROPOFOL 200 MG/20ML EMULSION: Performed by: NURSE ANESTHETIST, CERTIFIED REGISTERED

## 2023-01-01 PROCEDURE — 80053 COMPREHEN METABOLIC PANEL: CPT | Performed by: NURSE PRACTITIONER

## 2023-01-01 PROCEDURE — 93005 ELECTROCARDIOGRAM TRACING: CPT | Performed by: STUDENT IN AN ORGANIZED HEALTH CARE EDUCATION/TRAINING PROGRAM

## 2023-01-01 PROCEDURE — 76775 US EXAM ABDO BACK WALL LIM: CPT

## 2023-01-01 PROCEDURE — 25010000002 ALBUMIN HUMAN 25% PER 50 ML: Performed by: INTERNAL MEDICINE

## 2023-01-01 PROCEDURE — 25010000002 HYDROMORPHONE 1 MG/ML SOLUTION: Performed by: INTERNAL MEDICINE

## 2023-01-01 PROCEDURE — 83690 ASSAY OF LIPASE: CPT | Performed by: NURSE PRACTITIONER

## 2023-01-01 PROCEDURE — 71046 X-RAY EXAM CHEST 2 VIEWS: CPT

## 2023-01-01 PROCEDURE — 83690 ASSAY OF LIPASE: CPT | Performed by: INTERNAL MEDICINE

## 2023-01-01 PROCEDURE — 74018 RADEX ABDOMEN 1 VIEW: CPT

## 2023-01-01 PROCEDURE — 25010000002 POTASSIUM CHLORIDE PER 2 MEQ OF POTASSIUM: Performed by: INTERNAL MEDICINE

## 2023-01-01 PROCEDURE — 84165 PROTEIN E-PHORESIS SERUM: CPT | Performed by: INTERNAL MEDICINE

## 2023-01-01 PROCEDURE — 87086 URINE CULTURE/COLONY COUNT: CPT | Performed by: NURSE PRACTITIONER

## 2023-01-01 PROCEDURE — 81001 URINALYSIS AUTO W/SCOPE: CPT | Performed by: NURSE PRACTITIONER

## 2023-01-01 PROCEDURE — 84466 ASSAY OF TRANSFERRIN: CPT | Performed by: INTERNAL MEDICINE

## 2023-01-01 PROCEDURE — 74181 MRI ABDOMEN W/O CONTRAST: CPT

## 2023-01-01 PROCEDURE — 85025 COMPLETE CBC W/AUTO DIFF WBC: CPT | Performed by: INTERNAL MEDICINE

## 2023-01-01 PROCEDURE — 25010000002 METRONIDAZOLE 500 MG/100ML SOLUTION: Performed by: HOSPITALIST

## 2023-01-01 PROCEDURE — 84300 ASSAY OF URINE SODIUM: CPT | Performed by: INTERNAL MEDICINE

## 2023-01-01 PROCEDURE — 43274 ERCP DUCT STENT PLACEMENT: CPT | Performed by: INTERNAL MEDICINE

## 2023-01-01 PROCEDURE — 82728 ASSAY OF FERRITIN: CPT | Performed by: INTERNAL MEDICINE

## 2023-01-01 PROCEDURE — 97162 PT EVAL MOD COMPLEX 30 MIN: CPT

## 2023-01-01 PROCEDURE — 25010000002 AMIODARONE IN DEXTROSE 5% 360-4.14 MG/200ML-% SOLUTION: Performed by: NURSE PRACTITIONER

## 2023-01-01 PROCEDURE — 99233 SBSQ HOSP IP/OBS HIGH 50: CPT | Performed by: INTERNAL MEDICINE

## 2023-01-01 PROCEDURE — 25010000002 ONDANSETRON PER 1 MG: Performed by: NURSE ANESTHETIST, CERTIFIED REGISTERED

## 2023-01-01 PROCEDURE — 87077 CULTURE AEROBIC IDENTIFY: CPT | Performed by: NURSE PRACTITIONER

## 2023-01-01 PROCEDURE — 82330 ASSAY OF CALCIUM: CPT | Performed by: INTERNAL MEDICINE

## 2023-01-01 PROCEDURE — 25010000002 HYDROMORPHONE 1 MG/ML SOLUTION: Performed by: NURSE PRACTITIONER

## 2023-01-01 PROCEDURE — 87324 CLOSTRIDIUM AG IA: CPT | Performed by: NURSE PRACTITIONER

## 2023-01-01 PROCEDURE — 99232 SBSQ HOSP IP/OBS MODERATE 35: CPT | Performed by: NURSE PRACTITIONER

## 2023-01-01 PROCEDURE — 0FC98ZZ EXTIRPATION OF MATTER FROM COMMON BILE DUCT, VIA NATURAL OR ARTIFICIAL OPENING ENDOSCOPIC: ICD-10-PCS | Performed by: INTERNAL MEDICINE

## 2023-01-01 PROCEDURE — 83970 ASSAY OF PARATHORMONE: CPT | Performed by: INTERNAL MEDICINE

## 2023-01-01 PROCEDURE — 25010000002 AMIODARONE IN DEXTROSE 5% 150-4.21 MG/100ML-% SOLUTION: Performed by: INTERNAL MEDICINE

## 2023-01-01 PROCEDURE — 93005 ELECTROCARDIOGRAM TRACING: CPT | Performed by: INTERNAL MEDICINE

## 2023-01-01 PROCEDURE — 85610 PROTHROMBIN TIME: CPT | Performed by: NURSE PRACTITIONER

## 2023-01-01 PROCEDURE — 82550 ASSAY OF CK (CPK): CPT | Performed by: INTERNAL MEDICINE

## 2023-01-01 PROCEDURE — 94761 N-INVAS EAR/PLS OXIMETRY MLT: CPT

## 2023-01-01 PROCEDURE — 85007 BL SMEAR W/DIFF WBC COUNT: CPT | Performed by: INTERNAL MEDICINE

## 2023-01-01 PROCEDURE — P9047 ALBUMIN (HUMAN), 25%, 50ML: HCPCS | Performed by: INTERNAL MEDICINE

## 2023-01-01 PROCEDURE — 25010000002 LORAZEPAM PER 2 MG: Performed by: INTERNAL MEDICINE

## 2023-01-01 PROCEDURE — 82746 ASSAY OF FOLIC ACID SERUM: CPT | Performed by: INTERNAL MEDICINE

## 2023-01-01 PROCEDURE — 0F798DZ DILATION OF COMMON BILE DUCT WITH INTRALUMINAL DEVICE, VIA NATURAL OR ARTIFICIAL OPENING ENDOSCOPIC: ICD-10-PCS | Performed by: INTERNAL MEDICINE

## 2023-01-01 PROCEDURE — 71045 X-RAY EXAM CHEST 1 VIEW: CPT

## 2023-01-01 PROCEDURE — 25010000002 PHENYLEPHRINE 10 MG/ML SOLUTION 5 ML VIAL: Performed by: NURSE ANESTHETIST, CERTIFIED REGISTERED

## 2023-01-01 PROCEDURE — 85007 BL SMEAR W/DIFF WBC COUNT: CPT | Performed by: NURSE PRACTITIONER

## 2023-01-01 PROCEDURE — 87449 NOS EACH ORGANISM AG IA: CPT | Performed by: NURSE PRACTITIONER

## 2023-01-01 PROCEDURE — BF101ZZ FLUOROSCOPY OF BILE DUCTS USING LOW OSMOLAR CONTRAST: ICD-10-PCS | Performed by: INTERNAL MEDICINE

## 2023-01-01 PROCEDURE — 94640 AIRWAY INHALATION TREATMENT: CPT

## 2023-01-01 PROCEDURE — 99222 1ST HOSP IP/OBS MODERATE 55: CPT | Performed by: SURGERY

## 2023-01-01 PROCEDURE — 74328 X-RAY BILE DUCT ENDOSCOPY: CPT

## 2023-01-01 PROCEDURE — 25510000001 IOPAMIDOL 61 % SOLUTION 30 ML VIAL: Performed by: INTERNAL MEDICINE

## 2023-01-01 PROCEDURE — 25010000002 NA FERRIC GLUC CPLX PER 12.5 MG: Performed by: INTERNAL MEDICINE

## 2023-01-01 PROCEDURE — 84100 ASSAY OF PHOSPHORUS: CPT | Performed by: INTERNAL MEDICINE

## 2023-01-01 PROCEDURE — G0378 HOSPITAL OBSERVATION PER HR: HCPCS

## 2023-01-01 PROCEDURE — 99232 SBSQ HOSP IP/OBS MODERATE 35: CPT | Performed by: INTERNAL MEDICINE

## 2023-01-01 PROCEDURE — 85652 RBC SED RATE AUTOMATED: CPT | Performed by: NURSE PRACTITIONER

## 2023-01-01 PROCEDURE — 43264 ERCP REMOVE DUCT CALCULI: CPT | Mod: 59 | Performed by: INTERNAL MEDICINE

## 2023-01-01 PROCEDURE — C1726 CATH, BAL DIL, NON-VASCULAR: HCPCS | Performed by: INTERNAL MEDICINE

## 2023-01-01 PROCEDURE — 87102 FUNGUS ISOLATION CULTURE: CPT | Performed by: HOSPITALIST

## 2023-01-01 PROCEDURE — 84550 ASSAY OF BLOOD/URIC ACID: CPT | Performed by: INTERNAL MEDICINE

## 2023-01-01 PROCEDURE — 92610 EVALUATE SWALLOWING FUNCTION: CPT

## 2023-01-01 PROCEDURE — 70250 X-RAY EXAM OF SKULL: CPT

## 2023-01-01 PROCEDURE — 94664 DEMO&/EVAL PT USE INHALER: CPT

## 2023-01-01 DEVICE — BILIARY STENT WITH NAVIFLEXTM RX DELIVERY SYSTEM
Type: IMPLANTABLE DEVICE | Site: BILE DUCT | Status: FUNCTIONAL
Brand: ADVANIX™ BILIARY

## 2023-01-01 RX ORDER — ASPIRIN 81 MG/1
81 TABLET ORAL DAILY
COMMUNITY

## 2023-01-01 RX ORDER — GLYCOPYRROLATE 0.2 MG/ML
0.4 INJECTION INTRAMUSCULAR; INTRAVENOUS
Status: DISCONTINUED | OUTPATIENT
Start: 2023-01-01 | End: 2023-01-01

## 2023-01-01 RX ORDER — COVID-19 ANTIGEN TEST
440 KIT MISCELLANEOUS DAILY
COMMUNITY

## 2023-01-01 RX ORDER — ONDANSETRON 4 MG/1
4 TABLET, FILM COATED ORAL EVERY 6 HOURS PRN
Status: DISCONTINUED | OUTPATIENT
Start: 2023-01-01 | End: 2023-01-01

## 2023-01-01 RX ORDER — HYDROCHLOROTHIAZIDE 12.5 MG/1
12.5 CAPSULE, GELATIN COATED ORAL DAILY
COMMUNITY

## 2023-01-01 RX ORDER — LORAZEPAM 2 MG/ML
0.5 INJECTION INTRAMUSCULAR
Status: DISCONTINUED | OUTPATIENT
Start: 2023-01-01 | End: 2023-01-01 | Stop reason: HOSPADM

## 2023-01-01 RX ORDER — FUROSEMIDE 10 MG/ML
40 INJECTION INTRAMUSCULAR; INTRAVENOUS ONCE
Status: DISCONTINUED | OUTPATIENT
Start: 2023-01-01 | End: 2023-01-01

## 2023-01-01 RX ORDER — LORAZEPAM 2 MG/ML
2 INJECTION INTRAMUSCULAR
Status: DISCONTINUED | OUTPATIENT
Start: 2023-01-01 | End: 2023-01-01 | Stop reason: HOSPADM

## 2023-01-01 RX ORDER — DOXAZOSIN MESYLATE 4 MG/1
2 TABLET ORAL DAILY
COMMUNITY

## 2023-01-01 RX ORDER — CALCIUM GLUCONATE 20 MG/ML
1000 INJECTION, SOLUTION INTRAVENOUS EVERY 12 HOURS
Status: COMPLETED | OUTPATIENT
Start: 2023-01-01 | End: 2023-01-01

## 2023-01-01 RX ORDER — DEXTROSE MONOHYDRATE 50 MG/ML
125 INJECTION, SOLUTION INTRAVENOUS CONTINUOUS
Status: DISCONTINUED | OUTPATIENT
Start: 2023-01-01 | End: 2023-01-01

## 2023-01-01 RX ORDER — ONDANSETRON 2 MG/ML
INJECTION INTRAMUSCULAR; INTRAVENOUS AS NEEDED
Status: DISCONTINUED | OUTPATIENT
Start: 2023-01-01 | End: 2023-01-01 | Stop reason: SURG

## 2023-01-01 RX ORDER — LORAZEPAM 2 MG/ML
0.5 CONCENTRATE ORAL
Status: DISCONTINUED | OUTPATIENT
Start: 2023-01-01 | End: 2023-01-01 | Stop reason: HOSPADM

## 2023-01-01 RX ORDER — IPRATROPIUM BROMIDE AND ALBUTEROL SULFATE 2.5; .5 MG/3ML; MG/3ML
3 SOLUTION RESPIRATORY (INHALATION)
Status: DISCONTINUED | OUTPATIENT
Start: 2023-01-01 | End: 2023-01-01

## 2023-01-01 RX ORDER — PANTOPRAZOLE SODIUM 40 MG/10ML
40 INJECTION, POWDER, LYOPHILIZED, FOR SOLUTION INTRAVENOUS EVERY 12 HOURS SCHEDULED
Status: DISCONTINUED | OUTPATIENT
Start: 2023-01-01 | End: 2023-01-01

## 2023-01-01 RX ORDER — GUAIFENESIN AND CODEINE PHOSPHATE 100; 10 MG/5ML; MG/5ML
10 SOLUTION ORAL EVERY 6 HOURS SCHEDULED
Status: DISCONTINUED | OUTPATIENT
Start: 2023-01-01 | End: 2023-01-01

## 2023-01-01 RX ORDER — LORAZEPAM 1 MG/1
2 TABLET ORAL
Status: DISCONTINUED | OUTPATIENT
Start: 2023-01-01 | End: 2023-01-01 | Stop reason: HOSPADM

## 2023-01-01 RX ORDER — IBUPROFEN 600 MG/1
1 TABLET ORAL
Status: DISCONTINUED | OUTPATIENT
Start: 2023-01-01 | End: 2023-01-01

## 2023-01-01 RX ORDER — NICOTINE POLACRILEX 4 MG
15 LOZENGE BUCCAL
Status: DISCONTINUED | OUTPATIENT
Start: 2023-01-01 | End: 2023-01-01

## 2023-01-01 RX ORDER — DEXTROSE MONOHYDRATE 25 G/50ML
25 INJECTION, SOLUTION INTRAVENOUS
Status: DISCONTINUED | OUTPATIENT
Start: 2023-01-01 | End: 2023-01-01

## 2023-01-01 RX ORDER — FUROSEMIDE 10 MG/ML
40 INJECTION INTRAMUSCULAR; INTRAVENOUS DAILY
Status: DISCONTINUED | OUTPATIENT
Start: 2023-01-01 | End: 2023-01-01

## 2023-01-01 RX ORDER — LORAZEPAM 2 MG/ML
2 CONCENTRATE ORAL
Status: DISCONTINUED | OUTPATIENT
Start: 2023-01-01 | End: 2023-01-01 | Stop reason: HOSPADM

## 2023-01-01 RX ORDER — DILTIAZEM HCL/D5W 125 MG/125
5-15 PLASTIC BAG, INJECTION (ML) INTRAVENOUS
Status: DISCONTINUED | OUTPATIENT
Start: 2023-01-01 | End: 2023-01-01

## 2023-01-01 RX ORDER — LORAZEPAM 2 MG/ML
1 CONCENTRATE ORAL
Status: DISCONTINUED | OUTPATIENT
Start: 2023-01-01 | End: 2023-01-01 | Stop reason: HOSPADM

## 2023-01-01 RX ORDER — LORAZEPAM 2 MG/ML
1 INJECTION INTRAMUSCULAR
Status: DISCONTINUED | OUTPATIENT
Start: 2023-01-01 | End: 2023-01-01 | Stop reason: HOSPADM

## 2023-01-01 RX ORDER — ONDANSETRON 2 MG/ML
4 INJECTION INTRAMUSCULAR; INTRAVENOUS EVERY 6 HOURS PRN
Status: DISCONTINUED | OUTPATIENT
Start: 2023-01-01 | End: 2023-01-01

## 2023-01-01 RX ORDER — ALBUMIN (HUMAN) 12.5 G/50ML
25 SOLUTION INTRAVENOUS EVERY 8 HOURS
Status: COMPLETED | OUTPATIENT
Start: 2023-01-01 | End: 2023-01-01

## 2023-01-01 RX ORDER — EPHEDRINE SULFATE 5 MG/ML
5 INJECTION INTRAVENOUS ONCE AS NEEDED
Status: DISCONTINUED | OUTPATIENT
Start: 2023-01-01 | End: 2023-01-01 | Stop reason: HOSPADM

## 2023-01-01 RX ORDER — DIPHENOXYLATE HYDROCHLORIDE AND ATROPINE SULFATE 2.5; .025 MG/1; MG/1
1 TABLET ORAL
Status: DISCONTINUED | OUTPATIENT
Start: 2023-01-01 | End: 2023-01-01 | Stop reason: HOSPADM

## 2023-01-01 RX ORDER — GLYCOPYRROLATE 0.2 MG/ML
0.2 INJECTION INTRAMUSCULAR; INTRAVENOUS
Status: DISCONTINUED | OUTPATIENT
Start: 2023-01-01 | End: 2023-01-01

## 2023-01-01 RX ORDER — FINASTERIDE 5 MG/1
5 TABLET, FILM COATED ORAL DAILY
COMMUNITY

## 2023-01-01 RX ORDER — DONEPEZIL HYDROCHLORIDE 10 MG/1
10 TABLET, FILM COATED ORAL DAILY
COMMUNITY

## 2023-01-01 RX ORDER — HALOPERIDOL 5 MG/ML
1 INJECTION INTRAMUSCULAR ONCE
Status: COMPLETED | OUTPATIENT
Start: 2023-01-01 | End: 2023-01-01

## 2023-01-01 RX ORDER — METRONIDAZOLE 500 MG/100ML
500 INJECTION, SOLUTION INTRAVENOUS EVERY 8 HOURS
Status: DISCONTINUED | OUTPATIENT
Start: 2023-01-01 | End: 2023-01-01

## 2023-01-01 RX ORDER — PROPOFOL 10 MG/ML
INJECTION, EMULSION INTRAVENOUS AS NEEDED
Status: DISCONTINUED | OUTPATIENT
Start: 2023-01-01 | End: 2023-01-01 | Stop reason: SURG

## 2023-01-01 RX ORDER — POTASSIUM CHLORIDE, DEXTROSE MONOHYDRATE 150; 5 MG/100ML; G/100ML
125 INJECTION, SOLUTION INTRAVENOUS CONTINUOUS
Status: DISCONTINUED | OUTPATIENT
Start: 2023-01-01 | End: 2023-01-01

## 2023-01-01 RX ORDER — FENTANYL/ROPIVACAINE/NS/PF 2-625MCG/1
15 PLASTIC BAG, INJECTION (ML) EPIDURAL
Status: DISCONTINUED | OUTPATIENT
Start: 2023-01-01 | End: 2023-01-01

## 2023-01-01 RX ORDER — ONDANSETRON 4 MG/1
4 TABLET, FILM COATED ORAL EVERY 6 HOURS PRN
Status: DISCONTINUED | OUTPATIENT
Start: 2023-01-01 | End: 2023-01-01 | Stop reason: HOSPADM

## 2023-01-01 RX ORDER — MEMANTINE HYDROCHLORIDE 10 MG/1
10 TABLET ORAL NIGHTLY
Status: DISCONTINUED | OUTPATIENT
Start: 2023-01-01 | End: 2023-01-01

## 2023-01-01 RX ORDER — DEXTROSE MONOHYDRATE 50 MG/ML
100 INJECTION, SOLUTION INTRAVENOUS CONTINUOUS
Status: DISCONTINUED | OUTPATIENT
Start: 2023-01-01 | End: 2023-01-01

## 2023-01-01 RX ORDER — ONDANSETRON 2 MG/ML
4 INJECTION INTRAMUSCULAR; INTRAVENOUS ONCE AS NEEDED
Status: DISCONTINUED | OUTPATIENT
Start: 2023-01-01 | End: 2023-01-01 | Stop reason: HOSPADM

## 2023-01-01 RX ORDER — CALCIUM GLUCONATE 20 MG/ML
1000 INJECTION, SOLUTION INTRAVENOUS ONCE
Status: DISCONTINUED | OUTPATIENT
Start: 2023-01-01 | End: 2023-01-01

## 2023-01-01 RX ORDER — OMEPRAZOLE 20 MG/1
20 CAPSULE, DELAYED RELEASE ORAL DAILY
COMMUNITY

## 2023-01-01 RX ORDER — ACETAMINOPHEN 500 MG
500 TABLET ORAL DAILY
COMMUNITY

## 2023-01-01 RX ORDER — IPRATROPIUM BROMIDE AND ALBUTEROL SULFATE 2.5; .5 MG/3ML; MG/3ML
3 SOLUTION RESPIRATORY (INHALATION) ONCE AS NEEDED
Status: COMPLETED | OUTPATIENT
Start: 2023-01-01 | End: 2023-01-01

## 2023-01-01 RX ORDER — CARBOXYMETHYLCELLULOSE SODIUM 10 MG/ML
1 GEL OPHTHALMIC
Status: DISCONTINUED | OUTPATIENT
Start: 2023-01-01 | End: 2023-01-01 | Stop reason: HOSPADM

## 2023-01-01 RX ORDER — LORAZEPAM 0.5 MG/1
0.5 TABLET ORAL
Status: DISCONTINUED | OUTPATIENT
Start: 2023-01-01 | End: 2023-01-01 | Stop reason: HOSPADM

## 2023-01-01 RX ORDER — SCOLOPAMINE TRANSDERMAL SYSTEM 1 MG/1
1 PATCH, EXTENDED RELEASE TRANSDERMAL
Status: DISCONTINUED | OUTPATIENT
Start: 2023-01-01 | End: 2023-01-01

## 2023-01-01 RX ORDER — FINASTERIDE 5 MG/1
5 TABLET, FILM COATED ORAL DAILY
Status: DISCONTINUED | OUTPATIENT
Start: 2023-01-01 | End: 2023-01-01

## 2023-01-01 RX ORDER — MENTHOL 5.8 MG
1 LOZENGE MUCOUS MEMBRANE 3 TIMES DAILY PRN
COMMUNITY

## 2023-01-01 RX ORDER — GLYCOPYRROLATE 0.2 MG/ML
0.4 INJECTION INTRAMUSCULAR; INTRAVENOUS
Status: DISCONTINUED | OUTPATIENT
Start: 2023-01-01 | End: 2023-01-01 | Stop reason: HOSPADM

## 2023-01-01 RX ORDER — GABAPENTIN 300 MG/1
300 CAPSULE ORAL 3 TIMES DAILY
COMMUNITY

## 2023-01-01 RX ORDER — ATENOLOL 25 MG/1
12.5 TABLET ORAL DAILY
COMMUNITY

## 2023-01-01 RX ORDER — ECHINACEA PURPUREA EXTRACT 125 MG
1 TABLET ORAL AS NEEDED
Status: DISCONTINUED | OUTPATIENT
Start: 2023-01-01 | End: 2023-01-01 | Stop reason: HOSPADM

## 2023-01-01 RX ORDER — ACETAMINOPHEN 650 MG/1
650 SUPPOSITORY RECTAL EVERY 4 HOURS PRN
Status: DISCONTINUED | OUTPATIENT
Start: 2023-01-01 | End: 2023-01-01 | Stop reason: HOSPADM

## 2023-01-01 RX ORDER — ONDANSETRON 2 MG/ML
4 INJECTION INTRAMUSCULAR; INTRAVENOUS EVERY 6 HOURS PRN
Status: DISCONTINUED | OUTPATIENT
Start: 2023-01-01 | End: 2023-01-01 | Stop reason: HOSPADM

## 2023-01-01 RX ORDER — MEMANTINE HYDROCHLORIDE 10 MG/1
10 TABLET ORAL NIGHTLY
COMMUNITY

## 2023-01-01 RX ORDER — LIDOCAINE HYDROCHLORIDE 20 MG/ML
INJECTION, SOLUTION INFILTRATION; PERINEURAL AS NEEDED
Status: DISCONTINUED | OUTPATIENT
Start: 2023-01-01 | End: 2023-01-01 | Stop reason: SURG

## 2023-01-01 RX ORDER — ROCURONIUM BROMIDE 10 MG/ML
INJECTION, SOLUTION INTRAVENOUS AS NEEDED
Status: DISCONTINUED | OUTPATIENT
Start: 2023-01-01 | End: 2023-01-01 | Stop reason: SURG

## 2023-01-01 RX ORDER — PHENYLEPHRINE HCL IN 0.9% NACL 1 MG/10 ML
SYRINGE (ML) INTRAVENOUS AS NEEDED
Status: DISCONTINUED | OUTPATIENT
Start: 2023-01-01 | End: 2023-01-01 | Stop reason: SURG

## 2023-01-01 RX ORDER — IBUPROFEN 600 MG/1
TABLET ORAL AS NEEDED
Status: DISCONTINUED | OUTPATIENT
Start: 2023-01-01 | End: 2023-01-01 | Stop reason: SURG

## 2023-01-01 RX ORDER — LORAZEPAM 1 MG/1
1 TABLET ORAL
Status: DISCONTINUED | OUTPATIENT
Start: 2023-01-01 | End: 2023-01-01 | Stop reason: HOSPADM

## 2023-01-01 RX ORDER — INDOMETHACIN 100 MG
SUPPOSITORY, RECTAL RECTAL AS NEEDED
Status: DISCONTINUED | OUTPATIENT
Start: 2023-01-01 | End: 2023-01-01 | Stop reason: HOSPADM

## 2023-01-01 RX ORDER — SCOLOPAMINE TRANSDERMAL SYSTEM 1 MG/1
1 PATCH, EXTENDED RELEASE TRANSDERMAL
Status: DISCONTINUED | OUTPATIENT
Start: 2023-01-01 | End: 2023-01-01 | Stop reason: HOSPADM

## 2023-01-01 RX ORDER — SODIUM CHLORIDE 9 MG/ML
INJECTION, SOLUTION INTRAVENOUS CONTINUOUS PRN
Status: DISCONTINUED | OUTPATIENT
Start: 2023-01-01 | End: 2023-01-01 | Stop reason: SURG

## 2023-01-01 RX ORDER — DONEPEZIL HYDROCHLORIDE 5 MG/1
10 TABLET, FILM COATED ORAL DAILY
Status: DISCONTINUED | OUTPATIENT
Start: 2023-01-01 | End: 2023-01-01

## 2023-01-01 RX ORDER — SODIUM CHLORIDE 9 MG/ML
150 INJECTION, SOLUTION INTRAVENOUS CONTINUOUS
Status: DISCONTINUED | OUTPATIENT
Start: 2023-01-01 | End: 2023-01-01

## 2023-01-01 RX ORDER — ACETAMINOPHEN 160 MG/5ML
650 SOLUTION ORAL EVERY 4 HOURS PRN
Status: DISCONTINUED | OUTPATIENT
Start: 2023-01-01 | End: 2023-01-01 | Stop reason: HOSPADM

## 2023-01-01 RX ORDER — CHOLECALCIFEROL (VITAMIN D3) 125 MCG
5 CAPSULE ORAL NIGHTLY
COMMUNITY

## 2023-01-01 RX ORDER — ACETAMINOPHEN 325 MG/1
650 TABLET ORAL EVERY 4 HOURS PRN
Status: DISCONTINUED | OUTPATIENT
Start: 2023-01-01 | End: 2023-01-01 | Stop reason: HOSPADM

## 2023-01-01 RX ORDER — INSULIN LISPRO 100 [IU]/ML
2-7 INJECTION, SOLUTION INTRAVENOUS; SUBCUTANEOUS EVERY 6 HOURS
Status: DISCONTINUED | OUTPATIENT
Start: 2023-01-01 | End: 2023-01-01

## 2023-01-01 RX ORDER — LANOLIN ALCOHOL/MO/W.PET/CERES
1000 CREAM (GRAM) TOPICAL DAILY
COMMUNITY

## 2023-01-01 RX ADMIN — CALCIUM GLUCONATE 1000 MG: 20 INJECTION, SOLUTION INTRAVENOUS at 01:13

## 2023-01-01 RX ADMIN — HYDROMORPHONE HYDROCHLORIDE 0.5 MG: 1 INJECTION, SOLUTION INTRAMUSCULAR; INTRAVENOUS; SUBCUTANEOUS at 10:45

## 2023-01-01 RX ADMIN — METRONIDAZOLE 500 MG: 500 INJECTION, SOLUTION INTRAVENOUS at 03:37

## 2023-01-01 RX ADMIN — INSULIN LISPRO 3 UNITS: 100 INJECTION, SOLUTION INTRAVENOUS; SUBCUTANEOUS at 06:17

## 2023-01-01 RX ADMIN — ALBUMIN (HUMAN) 25 G: 0.25 INJECTION, SOLUTION INTRAVENOUS at 23:18

## 2023-01-01 RX ADMIN — AMIODARONE HYDROCHLORIDE 0.25 MG/MIN: 1.8 INJECTION, SOLUTION INTRAVENOUS at 17:46

## 2023-01-01 RX ADMIN — AMIODARONE HYDROCHLORIDE 0.5 MG/MIN: 1.8 INJECTION, SOLUTION INTRAVENOUS at 04:29

## 2023-01-01 RX ADMIN — METRONIDAZOLE 500 MG: 500 INJECTION, SOLUTION INTRAVENOUS at 05:29

## 2023-01-01 RX ADMIN — PANTOPRAZOLE SODIUM 40 MG: 40 INJECTION, POWDER, LYOPHILIZED, FOR SOLUTION INTRAVENOUS at 09:09

## 2023-01-01 RX ADMIN — PIPERACILLIN AND TAZOBACTAM 3.38 G: 3; .375 INJECTION, POWDER, FOR SOLUTION INTRAVENOUS at 18:03

## 2023-01-01 RX ADMIN — PANTOPRAZOLE SODIUM 40 MG: 40 INJECTION, POWDER, LYOPHILIZED, FOR SOLUTION INTRAVENOUS at 09:48

## 2023-01-01 RX ADMIN — CEFTRIAXONE 1000 MG: 1 INJECTION, POWDER, FOR SOLUTION INTRAMUSCULAR; INTRAVENOUS at 12:07

## 2023-01-01 RX ADMIN — ALBUMIN (HUMAN) 25 G: 0.25 INJECTION, SOLUTION INTRAVENOUS at 05:52

## 2023-01-01 RX ADMIN — LORAZEPAM 0.5 MG: 2 INJECTION INTRAMUSCULAR; INTRAVENOUS at 14:15

## 2023-01-01 RX ADMIN — GLYCOPYRROLATE 0.4 MG: 0.2 INJECTION INTRAMUSCULAR; INTRAVENOUS at 16:57

## 2023-01-01 RX ADMIN — IPRATROPIUM BROMIDE AND ALBUTEROL SULFATE 3 ML: .5; 3 SOLUTION RESPIRATORY (INHALATION) at 11:33

## 2023-01-01 RX ADMIN — PIPERACILLIN AND TAZOBACTAM 3.38 G: 3; .375 INJECTION, POWDER, FOR SOLUTION INTRAVENOUS at 19:59

## 2023-01-01 RX ADMIN — SODIUM BICARBONATE 50 MEQ: 84 INJECTION, SOLUTION INTRAVENOUS at 22:36

## 2023-01-01 RX ADMIN — CEFTRIAXONE 1000 MG: 1 INJECTION, POWDER, FOR SOLUTION INTRAMUSCULAR; INTRAVENOUS at 11:24

## 2023-01-01 RX ADMIN — POTASSIUM CHLORIDE AND DEXTROSE MONOHYDRATE 125 ML/HR: 150; 5 INJECTION, SOLUTION INTRAVENOUS at 14:51

## 2023-01-01 RX ADMIN — LIDOCAINE HYDROCHLORIDE 100 MG: 20 INJECTION, SOLUTION INFILTRATION; PERINEURAL at 17:01

## 2023-01-01 RX ADMIN — LORAZEPAM 0.5 MG: 2 INJECTION INTRAMUSCULAR; INTRAVENOUS at 16:11

## 2023-01-01 RX ADMIN — Medication 5 MG/HR: at 18:40

## 2023-01-01 RX ADMIN — HYDROMORPHONE HYDROCHLORIDE 1 MG: 1 INJECTION, SOLUTION INTRAMUSCULAR; INTRAVENOUS; SUBCUTANEOUS at 16:11

## 2023-01-01 RX ADMIN — SODIUM CHLORIDE 150 ML/HR: 9 INJECTION, SOLUTION INTRAVENOUS at 05:28

## 2023-01-01 RX ADMIN — METRONIDAZOLE 500 MG: 500 INJECTION, SOLUTION INTRAVENOUS at 13:57

## 2023-01-01 RX ADMIN — SCOPALAMINE 1 PATCH: 1 PATCH, EXTENDED RELEASE TRANSDERMAL at 08:17

## 2023-01-01 RX ADMIN — PIPERACILLIN AND TAZOBACTAM 3.38 G: 3; .375 INJECTION, POWDER, FOR SOLUTION INTRAVENOUS at 03:53

## 2023-01-01 RX ADMIN — GUAIFENESIN AND CODEINE PHOSPHATE 10 ML: 100; 10 SOLUTION ORAL at 05:28

## 2023-01-01 RX ADMIN — GLYCOPYRROLATE 0.4 MG: 0.2 INJECTION INTRAMUSCULAR; INTRAVENOUS at 11:20

## 2023-01-01 RX ADMIN — METRONIDAZOLE 500 MG: 500 INJECTION, SOLUTION INTRAVENOUS at 21:53

## 2023-01-01 RX ADMIN — GLYCOPYRROLATE 0.4 MG: 0.2 INJECTION INTRAMUSCULAR; INTRAVENOUS at 14:15

## 2023-01-01 RX ADMIN — DEXTROSE MONOHYDRATE 100 ML/HR: 50 INJECTION, SOLUTION INTRAVENOUS at 14:19

## 2023-01-01 RX ADMIN — GLUCAGON 0.5 MG: KIT at 17:29

## 2023-01-01 RX ADMIN — GUAIFENESIN AND CODEINE PHOSPHATE 10 ML: 100; 10 SOLUTION ORAL at 20:08

## 2023-01-01 RX ADMIN — FUROSEMIDE 40 MG: 10 INJECTION, SOLUTION INTRAMUSCULAR; INTRAVENOUS at 09:48

## 2023-01-01 RX ADMIN — ALBUMIN (HUMAN) 25 G: 0.25 INJECTION, SOLUTION INTRAVENOUS at 14:17

## 2023-01-01 RX ADMIN — LORAZEPAM 0.5 MG: 2 INJECTION INTRAMUSCULAR; INTRAVENOUS at 17:39

## 2023-01-01 RX ADMIN — METRONIDAZOLE 500 MG: 500 INJECTION, SOLUTION INTRAVENOUS at 10:41

## 2023-01-01 RX ADMIN — LORAZEPAM 0.5 MG: 2 INJECTION INTRAMUSCULAR; INTRAVENOUS at 12:46

## 2023-01-01 RX ADMIN — HYDROMORPHONE HYDROCHLORIDE 0.5 MG: 1 INJECTION, SOLUTION INTRAMUSCULAR; INTRAVENOUS; SUBCUTANEOUS at 06:28

## 2023-01-01 RX ADMIN — INSULIN LISPRO 3 UNITS: 100 INJECTION, SOLUTION INTRAVENOUS; SUBCUTANEOUS at 06:11

## 2023-01-01 RX ADMIN — SODIUM CHLORIDE 100 ML/HR: 9 INJECTION, SOLUTION INTRAVENOUS at 05:30

## 2023-01-01 RX ADMIN — PIPERACILLIN AND TAZOBACTAM 3.38 G: 3; .375 INJECTION, POWDER, FOR SOLUTION INTRAVENOUS at 05:32

## 2023-01-01 RX ADMIN — CEFTRIAXONE 1000 MG: 1 INJECTION, POWDER, FOR SOLUTION INTRAMUSCULAR; INTRAVENOUS at 11:58

## 2023-01-01 RX ADMIN — PROPOFOL 100 MG: 10 INJECTION, EMULSION INTRAVENOUS at 17:01

## 2023-01-01 RX ADMIN — AMIODARONE HYDROCHLORIDE 150 MG: 1.5 INJECTION, SOLUTION INTRAVENOUS at 21:55

## 2023-01-01 RX ADMIN — IPRATROPIUM BROMIDE AND ALBUTEROL SULFATE 3 ML: .5; 3 SOLUTION RESPIRATORY (INHALATION) at 15:40

## 2023-01-01 RX ADMIN — Medication 300 MCG: at 17:11

## 2023-01-01 RX ADMIN — PIPERACILLIN AND TAZOBACTAM 3.38 G: 3; .375 INJECTION, POWDER, FOR SOLUTION INTRAVENOUS at 10:17

## 2023-01-01 RX ADMIN — HALOPERIDOL LACTATE 1 MG: 5 INJECTION, SOLUTION INTRAMUSCULAR at 20:10

## 2023-01-01 RX ADMIN — METRONIDAZOLE 500 MG: 500 INJECTION, SOLUTION INTRAVENOUS at 21:55

## 2023-01-01 RX ADMIN — INSULIN LISPRO 4 UNITS: 100 INJECTION, SOLUTION INTRAVENOUS; SUBCUTANEOUS at 18:24

## 2023-01-01 RX ADMIN — SODIUM CHLORIDE 150 ML/HR: 9 INJECTION, SOLUTION INTRAVENOUS at 19:59

## 2023-01-01 RX ADMIN — INSULIN LISPRO 3 UNITS: 100 INJECTION, SOLUTION INTRAVENOUS; SUBCUTANEOUS at 00:30

## 2023-01-01 RX ADMIN — PANTOPRAZOLE SODIUM 40 MG: 40 INJECTION, POWDER, LYOPHILIZED, FOR SOLUTION INTRAVENOUS at 10:41

## 2023-01-01 RX ADMIN — SODIUM CHLORIDE: 9 INJECTION, SOLUTION INTRAVENOUS at 16:57

## 2023-01-01 RX ADMIN — SODIUM CHLORIDE 150 ML/HR: 9 INJECTION, SOLUTION INTRAVENOUS at 12:06

## 2023-01-01 RX ADMIN — HYDROMORPHONE HYDROCHLORIDE 1 MG: 1 INJECTION, SOLUTION INTRAMUSCULAR; INTRAVENOUS; SUBCUTANEOUS at 14:16

## 2023-01-01 RX ADMIN — PROPOFOL 50 MG: 10 INJECTION, EMULSION INTRAVENOUS at 17:05

## 2023-01-01 RX ADMIN — METRONIDAZOLE 500 MG: 500 INJECTION, SOLUTION INTRAVENOUS at 20:07

## 2023-01-01 RX ADMIN — PIPERACILLIN AND TAZOBACTAM 3.38 G: 3; .375 INJECTION, POWDER, FOR SOLUTION INTRAVENOUS at 03:49

## 2023-01-01 RX ADMIN — SODIUM CHLORIDE 150 ML/HR: 9 INJECTION, SOLUTION INTRAVENOUS at 16:27

## 2023-01-01 RX ADMIN — HYDROMORPHONE HYDROCHLORIDE 1 MG: 1 INJECTION, SOLUTION INTRAMUSCULAR; INTRAVENOUS; SUBCUTANEOUS at 11:20

## 2023-01-01 RX ADMIN — HYDROMORPHONE HYDROCHLORIDE 1 MG: 1 INJECTION, SOLUTION INTRAMUSCULAR; INTRAVENOUS; SUBCUTANEOUS at 17:39

## 2023-01-01 RX ADMIN — PANTOPRAZOLE SODIUM 40 MG: 40 INJECTION, POWDER, LYOPHILIZED, FOR SOLUTION INTRAVENOUS at 21:55

## 2023-01-01 RX ADMIN — SODIUM CHLORIDE 125 MG: 9 INJECTION, SOLUTION INTRAVENOUS at 14:52

## 2023-01-01 RX ADMIN — CALCIUM GLUCONATE 1000 MG: 20 INJECTION, SOLUTION INTRAVENOUS at 14:19

## 2023-01-01 RX ADMIN — IPRATROPIUM BROMIDE AND ALBUTEROL SULFATE 3 ML: .5; 3 SOLUTION RESPIRATORY (INHALATION) at 07:21

## 2023-01-01 RX ADMIN — PANTOPRAZOLE SODIUM 40 MG: 40 INJECTION, POWDER, LYOPHILIZED, FOR SOLUTION INTRAVENOUS at 20:10

## 2023-01-01 RX ADMIN — FUROSEMIDE 40 MG: 10 INJECTION, SOLUTION INTRAMUSCULAR; INTRAVENOUS at 17:10

## 2023-01-01 RX ADMIN — METRONIDAZOLE 500 MG: 500 INJECTION, SOLUTION INTRAVENOUS at 06:17

## 2023-01-01 RX ADMIN — METRONIDAZOLE 500 MG: 500 INJECTION, SOLUTION INTRAVENOUS at 11:25

## 2023-01-01 RX ADMIN — IPRATROPIUM BROMIDE AND ALBUTEROL SULFATE 3 ML: .5; 3 SOLUTION RESPIRATORY (INHALATION) at 20:45

## 2023-01-01 RX ADMIN — ONDANSETRON 4 MG: 2 INJECTION INTRAMUSCULAR; INTRAVENOUS at 17:23

## 2023-01-01 RX ADMIN — SUGAMMADEX 200 MG: 100 INJECTION, SOLUTION INTRAVENOUS at 18:21

## 2023-01-01 RX ADMIN — PHENYLEPHRINE HYDROCHLORIDE 3 MCG/KG/MIN: 10 INJECTION INTRAVENOUS at 17:06

## 2023-01-01 RX ADMIN — SODIUM BICARBONATE 50 MEQ: 84 INJECTION, SOLUTION INTRAVENOUS at 05:51

## 2023-01-01 RX ADMIN — SODIUM CHLORIDE 150 ML/HR: 9 INJECTION, SOLUTION INTRAVENOUS at 21:59

## 2023-01-01 RX ADMIN — FUROSEMIDE 40 MG: 10 INJECTION, SOLUTION INTRAMUSCULAR; INTRAVENOUS at 08:17

## 2023-01-01 RX ADMIN — PANTOPRAZOLE SODIUM 40 MG: 40 INJECTION, POWDER, LYOPHILIZED, FOR SOLUTION INTRAVENOUS at 20:08

## 2023-01-01 RX ADMIN — Medication 12.5 MG/HR: at 04:31

## 2023-01-01 RX ADMIN — AMIODARONE HYDROCHLORIDE 1 MG/MIN: 1.8 INJECTION, SOLUTION INTRAVENOUS at 22:07

## 2023-01-01 RX ADMIN — IPRATROPIUM BROMIDE AND ALBUTEROL SULFATE 3 ML: .5; 3 SOLUTION RESPIRATORY (INHALATION) at 18:54

## 2023-01-01 RX ADMIN — Medication 10 MG/HR: at 14:06

## 2023-01-01 RX ADMIN — PANTOPRAZOLE SODIUM 40 MG: 40 INJECTION, POWDER, LYOPHILIZED, FOR SOLUTION INTRAVENOUS at 20:02

## 2023-01-01 RX ADMIN — GLUCAGON 0.5 MG: KIT at 17:55

## 2023-01-01 RX ADMIN — IPRATROPIUM BROMIDE AND ALBUTEROL SULFATE 3 ML: .5; 3 SOLUTION RESPIRATORY (INHALATION) at 15:53

## 2023-01-01 RX ADMIN — HALOPERIDOL LACTATE 1 MG: 5 INJECTION, SOLUTION INTRAMUSCULAR at 06:52

## 2023-01-01 RX ADMIN — PIPERACILLIN AND TAZOBACTAM 3.38 G: 3; .375 INJECTION, POWDER, FOR SOLUTION INTRAVENOUS at 11:30

## 2023-01-01 RX ADMIN — SODIUM BICARBONATE 50 MEQ: 84 INJECTION, SOLUTION INTRAVENOUS at 14:20

## 2023-01-01 RX ADMIN — SODIUM CHLORIDE: 9 INJECTION, SOLUTION INTRAVENOUS at 18:20

## 2023-01-01 RX ADMIN — GLYCOPYRROLATE 0.4 MG: 0.2 INJECTION INTRAMUSCULAR; INTRAVENOUS at 12:36

## 2023-01-01 RX ADMIN — IPRATROPIUM BROMIDE AND ALBUTEROL SULFATE 3 ML: .5; 3 SOLUTION RESPIRATORY (INHALATION) at 12:17

## 2023-01-01 RX ADMIN — PANTOPRAZOLE SODIUM 40 MG: 40 INJECTION, POWDER, LYOPHILIZED, FOR SOLUTION INTRAVENOUS at 11:24

## 2023-01-01 RX ADMIN — POTASSIUM CHLORIDE: 2 INJECTION, SOLUTION, CONCENTRATE INTRAVENOUS at 09:48

## 2023-01-01 RX ADMIN — PANTOPRAZOLE SODIUM 40 MG: 40 INJECTION, POWDER, LYOPHILIZED, FOR SOLUTION INTRAVENOUS at 21:53

## 2023-01-01 RX ADMIN — ROCURONIUM BROMIDE 30 MG: 10 INJECTION, SOLUTION INTRAVENOUS at 17:01

## 2023-01-01 RX ADMIN — HYDROMORPHONE HYDROCHLORIDE 1 MG: 1 INJECTION, SOLUTION INTRAMUSCULAR; INTRAVENOUS; SUBCUTANEOUS at 12:37

## 2023-01-01 RX ADMIN — SODIUM CHLORIDE 150 ML/HR: 9 INJECTION, SOLUTION INTRAVENOUS at 01:27

## 2023-09-18 PROBLEM — E03.9 HYPOTHYROIDISM (ACQUIRED): Status: ACTIVE | Noted: 2023-01-01

## 2023-09-18 PROBLEM — E87.6 HYPOKALEMIA: Status: ACTIVE | Noted: 2023-01-01

## 2023-09-18 PROBLEM — N28.9 RENAL INSUFFICIENCY: Status: ACTIVE | Noted: 2023-01-01

## 2023-09-18 PROBLEM — E78.5 HYPERLIPIDEMIA: Status: ACTIVE | Noted: 2023-01-01

## 2023-09-18 PROBLEM — G20 PARKINSON'S DISEASE DEMENTIA: Status: ACTIVE | Noted: 2023-01-01

## 2023-09-18 PROBLEM — D72.829 LEUKOCYTOSIS: Status: ACTIVE | Noted: 2023-01-01

## 2023-09-18 PROBLEM — Z86.79 HISTORY OF CORONARY ARTERY DISEASE: Status: ACTIVE | Noted: 2023-01-01

## 2023-09-18 PROBLEM — I10 ESSENTIAL HYPERTENSION: Status: ACTIVE | Noted: 2023-01-01

## 2023-09-18 PROBLEM — F02.80 PARKINSON'S DISEASE DEMENTIA: Status: ACTIVE | Noted: 2023-01-01

## 2023-09-18 PROBLEM — K85.90 PANCREATITIS: Status: ACTIVE | Noted: 2023-01-01

## 2023-09-18 PROBLEM — K80.20 CHOLELITHIASIS: Status: ACTIVE | Noted: 2023-01-01

## 2023-09-18 PROBLEM — K80.50 CHOLEDOCHOLITHIASIS: Status: ACTIVE | Noted: 2023-01-01

## 2023-09-18 PROBLEM — G20.A1 PARKINSON'S DISEASE DEMENTIA: Status: ACTIVE | Noted: 2023-01-01

## 2023-09-18 NOTE — OP NOTE
ENDOSCOPIC RETROGRADE CHOLANGIOPANCREATOGRAPHY Procedure Report    Patient Name:  Magnus Talbot  YOB: 1934    Date of Surgery:  9/18/2023     Pre-Op Diagnosis:  Choledocholithiasis [K80.50]  Acute biliary pancreatitis, unspecified complication status [K85.10]  Elevated liver enzymes  Bile duct dilation    Postop diagnosis:  1.  Food and blood in stomach  2.  Choledocholithiasis  3.  Common bile duct dilation  4.  Hiatal hernia      Procedure/CPT® Codes:      Procedure(s):  ENDOSCOPIC RETROGRADE CHOLANGIOPANCREATOGRAPHY, SPHINTEROTOMY, SPHINTEROPLASTY, CLEARANCE OF BILE DUCT WITH BALLOON (12MM-15MM, UP TO 15MM) EXTRACTION OF BILIARY STONES WITH BALLOON , OCCLUSION CHOLANGIOGRAM,  PLACEMENT OF BILIARY STENT, ESOPHAGOGASTRODUODNESCOPY    Staff:  Surgeon(s):  Ray Hitchcock MD      Anesthesia: General    Description of Procedure:  A description of the procedure as well as risks, benefits and alternative methods were explained to the patient who voiced understanding and signed the corresponding consent form.Specifically risks of post-ERCP pancreatitis, bleeding, perforation, failure to canulate and adverse reaction to sedation were discussed. A physical exam was performed and vital signs were monitored throughout the procedure.    A  film was performed which was normal. With the patient in the semi-prone position, an Olympus side viewing endoscope was placed into the mouth and proceeded through the esophagus, stomach where a large amount of food and dark thick liquid suspicious for blood was visualized.  The scope was then advanced to the second portion of the duodenum without difficulty. Limited views of the esophagus and stomach were normal. The ampulla was visualized and appeared normal with a large redundant fold hanging over the ampulla.  It took approximately 15 minutes of washing and using a sphincterotome to move around the folds in order to identify the ampulla.  2 endoclips were  attempted to place across the folds to expose the ampulla which were unsuccessful.  Neither clips were successfully latched.  A [employer] hydrotome was used to cannulate the ampulla using wire guided technique in the long position that was rather difficult. Bile was aspirated to ensure this was the duct of interest. Contrast was injected into the bile duct.  Small filling defects plus a dilated common bile duct was visualized.  A sphincterotomy was performed using the sphincterotome and ERBE in the usual fashion.  Only a small sphincterotomy could be performed given the large redundant fold hanging over the ampulla.  Next, an 8 mm x 4 cm HurriCaine dilation balloon was used to dilate the ampulla.  This was held for 2 minutes performing a sphincteroplasty.  Next a 12 mm to 15 mm stone extraction balloon was used to sweep the duct numerous times extracting numerous small stones.  Next, an occlusion cholangiogram was then performed attempting to pressure backfill the bile duct to see if the gallbladder would fail.  The cystic duct would start to fill but appears completely occluded and no contrast entered the gallbladder.  Next, due to continued and persistent oozing, a 10 [place] by 7 cm plastic biliary stent was placed across the ampulla and hemostasis was achieved.The scope was then retroflexed and the fundus was visualized. The procedure was not difficult and there were no immediate complications.  There was minimal blood loss.  Next, an adult EGD scope was advanced into the esophagus, stomach and 3 to 400 cc of dark-colored emesis were suctioned.  No source of active bleeding was visualized but a large portion of the stomach could not be visualized due to food and blood obstructing the view.  The scope was then advanced to the duodenum and no ulcer or bleeding sources visualized in D1 or D2.  The scope was withdrawn and the procedure was complete.     Of note, dark-colored emesis did appear in the ET tube  towards the end of the procedure indicating he is likely aspirating.    Impression:  1.  Successful selective cannulation of the common bile duct followed by sphincterotomy followed by sphincteroplasty using an 8 mm x 4 cm HurriCaine dilation balloon held for 2 minutes followed by stone extraction using a 12 mm to 15 mm stone extraction balloon followed by occlusion cholangiogram with no filling of the cystic duct or gallbladder followed by stent placement using a 10 [place] by 7 cm plastic biliary stent.  2.  Normal esophageal mucosa entire esophagus  3.  Medium size hiatal hernia was present the cardia  4.  Dark-colored liquid as well as food filled much of the stomach suspicious for underlying upper GI bleed.  Despite suctioning is much as possible, no source was found.  There is no bright red blood anywhere in the upper GI tract so this actually could just be coffee or some sort of dark drink.  5.  Suspicious for underlying aspiration towards the end of the procedure as there was a dark liquid in his ET tube.    Recommendations:  1.  Antibiotics  2.  N.p.o. tonight  3.  Monitor hemoglobin  4.  Any source of blood loss may warrant repeat EGD as food filled much of the stomach limiting the view of much of the gastric mucosa.  5.  Twice daily PPI IV  6.  Repeat EGD in 6 to 12 weeks with stent removal.  This could be an EGD under MAC anesthesia   7.  Given the lack of filling of the gallbladder and complete obstruction of the cystic duct, a cholecystectomy may be warranted      Ray Hitchcock MD     Date: 9/18/2023    Time: 18:21 EDT

## 2023-09-18 NOTE — SIGNIFICANT NOTE
09/18/23 1436   OTHER   Discipline physical therapist   Rehab Time/Intention   Session Not Performed   (RN requested hold off eval until tomorrow to allow pt to rest.)   Recommendation   PT - Next Appointment 09/19/23

## 2023-09-18 NOTE — CONSULTS
GENERAL SURGERY CONSULT    Referring Provider: Javier  Reason for Consultation: Gallstones, pancreatitis    Patient Care Team:  Tip Parikh MD as PCP - General (Family Medicine)    Chief complaint: abdominal pain, pancreatitis    Subjective .     History of present illness: 88-year-old gentleman who is transferred here from [facility] where he presented from his care facility after he was noted that he was having upper abdominal pain and poor p.o. tolerance.  He is accompanied by his wife who provides his history.  She states that she was visiting the patient until about 3 PM Sunday and he seemed to be doing well.  He does have Parkinson's and dementia.  However she was contacted by his facility stating that he appeared to be ill and was complaining of significant pain in the upper abdomen and they recommended that she come pick him up and take him to the ER.  In the emergency department there he was found to have pancreatitis on lab work.  There was concern that he may have biliary pancreatitis given findings consistent with gallstones and therefore it was requested that he be transferred to our facility.  At the time my consultation with them he has undergone MRCP that showed evidence of multiple small stones in the distal common bile duct.  His lipase does appear to be resolving on labs.    The patient is resting in bed and not communicative during my visit.    Review of Systems    Review of Systems   Unable to perform ROS: Mental status change       History  Past Medical History:   Diagnosis Date    CAD (coronary artery disease)     Dementia     Parkinsons     Thyroid disease    ,   Past Surgical History:   Procedure Laterality Date    LEFT HEART CATH     , History reviewed. No pertinent family history.,   Social History     Tobacco Use    Smoking status: Former     Types: Cigarettes   Vaping Use    Vaping Use: Never used   Substance Use Topics    Alcohol use: Yes     Alcohol/week: 7.0 standard drinks      Types: 7 Glasses of wine per week    Drug use: No   ,   Medications Prior to Admission   Medication Sig Dispense Refill Last Dose    acetaminophen (TYLENOL) 500 MG tablet Take 1 tablet by mouth 1 (One) Time.   9/17/2023    aspirin 81 MG EC tablet Take 1 tablet by mouth Daily.   9/17/2023    atenolol (TENORMIN) 25 MG tablet Take 1 tablet by mouth Daily.   9/17/2023    carbidopa-levodopa (SINEMET)  MG per tablet Take 1 tablet by mouth 3 (Three) Times a Day.   9/17/2023    donepezil (ARICEPT) 10 MG tablet Take 1 tablet by mouth Every Night.   9/17/2023    doxazosin (CARDURA) 4 MG tablet Take 1 tablet by mouth Every Night.   9/17/2023    finasteride (PROSCAR) 5 MG tablet Take 1 tablet by mouth Daily.   9/17/2023    gabapentin (NEURONTIN) 300 MG capsule Take 1 capsule by mouth 3 (Three) Times a Day.   9/17/2023    hydroCHLOROthiazide (HYDRODIURIL) 12.5 MG tablet Take 1 tablet by mouth Daily.   9/17/2023    melatonin 5 MG tablet tablet Take 1 tablet by mouth.   9/17/2023    memantine (NAMENDA) 10 MG tablet Take 1 tablet by mouth 2 (Two) Times a Day.   9/17/2023    naproxen sodium (ALEVE) 220 MG tablet Take 1 tablet by mouth 2 (Two) Times a Day As Needed.   9/17/2023    omeprazole (priLOSEC) 20 MG capsule Take 1 capsule by mouth Daily.   9/17/2023    vitamin B-12 (CYANOCOBALAMIN) 1000 MCG tablet Take 1 tablet by mouth Daily.   9/17/2023   , Scheduled Meds:  piperacillin-tazobactam, 3.375 g, Intravenous, Q8H    , Continuous Infusions:  Pharmacy to Dose Zosyn,   sodium chloride, 150 mL/hr, Last Rate: 150 mL/hr (09/18/23 1016)    , PRN Meds:    HYDROmorphone    ondansetron    Pharmacy to Dose Zosyn    Potassium Replacement - Follow Nurse / BPA Driven Protocol and Allergies:  Patient has no known allergies.    Objective     Vital Signs   Temp:  [97.7 °F (36.5 °C)-97.9 °F (36.6 °C)] 97.9 °F (36.6 °C)  Heart Rate:  [] 104  Resp:  [15-21] 21  BP: (115-136)/(55-61) 115/55    Physical Exam:       General Appearance:     Alert, cooperative, in no acute distress   Head:    Normocephalic, without obvious abnormality, atraumatic   Eyes:            Closed, lids normal   Ears:    Ears appear intact with no abnormalities noted   Abdomen:     Soft, no obvious masses, no tenderness   Pulses:   Pulses palpable and equal bilaterally   Skin:   No bleeding, bruising or rash   Neurologic:   Unable to assess       Results Review:  Lab Results (last 72 hours)       Procedure Component Value Units Date/Time    C-reactive Protein [[id number]]  (Abnormal) Collected: 09/18/23 0717    Specimen: Blood Updated: 09/18/23 0956     C-Reactive Protein 5.55 mg/dL     Lipase [[id number]]  (Abnormal) Collected: 09/18/23 0717    Specimen: Blood Updated: 09/18/23 0850     Lipase 474 U/L     Protime-INR [[id number]]  (Normal) Collected: 09/18/23 0717    Specimen: Blood Updated: 09/18/23 0824     Protime 11.2 Seconds      INR 1.05    Comprehensive Metabolic Panel [[id number]]  (Abnormal) Collected: 09/18/23 0717    Specimen: Blood Updated: 09/18/23 0810     Glucose 229 mg/dL      BUN 47 mg/dL      Creatinine 1.26 mg/dL      Sodium 140 mmol/L      Potassium 3.7 mmol/L      Chloride 101 mmol/L      CO2 19.0 mmol/L      Calcium 8.9 mg/dL      Total Protein 6.0 g/dL      Albumin 3.5 g/dL      ALT (SGPT) 87 U/L      AST (SGOT) 172 U/L      Alkaline Phosphatase 86 U/L      Total Bilirubin 0.9 mg/dL      Globulin 2.5 gm/dL      A/G Ratio 1.4 g/dL      BUN/Creatinine Ratio 37.3     Anion Gap 20.0 mmol/L      eGFR 54.9 mL/min/1.73     Narrative:      GFR Normal >60  Chronic Kidney Disease <60  Kidney Failure <15    The GFR formula is only valid for adults with stable renal function between ages 18 and 70.    Sedimentation Rate [[id number]]  (Normal) Collected: 09/18/23 0717    Specimen: Blood Updated: 09/18/23 0755     Sed Rate 14 mm/hr     CBC & Differential [[id number]]  (Abnormal) Collected: 09/18/23 0717    Specimen: Blood Updated: 09/18/23 0744    Narrative:      The  following orders were created for panel order CBC & Differential.  Procedure                               Abnormality         Status                     ---------                               -----------         ------                     CBC Auto Differential[[id number]]        Abnormal            Final result                 Please view results for these tests on the individual orders.    CBC Auto Differential [[id number]]  (Abnormal) Collected: 09/18/23 0717    Specimen: Blood Updated: 09/18/23 0744     WBC 2.80 10*3/mm3      RBC 3.60 10*6/mm3      Hemoglobin 12.6 g/dL      Hematocrit 37.6 %      .3 fL      MCH 34.9 pg      MCHC 33.5 g/dL      RDW 14.1 %      RDW-SD 54.7 fl      MPV 8.0 fL      Platelets 150 10*3/mm3      Neutrophil % 95.6 %      Lymphocyte % 2.4 %      Monocyte % 1.9 %      Eosinophil % 0.0 %      Basophil % 0.1 %      Neutrophils, Absolute 2.60 10*3/mm3      Lymphocytes, Absolute 0.10 10*3/mm3      Monocytes, Absolute 0.10 10*3/mm3      Eosinophils, Absolute 0.00 10*3/mm3      Basophils, Absolute 0.00 10*3/mm3      nRBC 0.2 /100 WBC     Urinalysis With Culture If Indicated - Urine, Clean Catch [[id number]]  (Abnormal) Collected: 09/18/23 0557    Specimen: Urine, Clean Catch Updated: 09/18/23 0612     Color, UA Dark Yellow     Appearance, UA Cloudy     Comment: Result checked          pH, UA <=5.0     Specific Gravity, UA 1.041     Glucose,  mg/dL (1+)     Ketones, UA Negative     Bilirubin, UA Negative     Blood, UA Large (3+)     Protein, UA 30 mg/dL (1+)     Leuk Esterase, UA Moderate (2+)     Nitrite, UA Positive     Urobilinogen, UA 1.0 E.U./dL    Narrative:      In absence of clinical symptoms, the presence of pyuria, bacteria, and/or nitrites on the urinalysis result does not correlate with infection.    Urinalysis, Microscopic Only - Urine, Clean Catch [[id number]]  (Abnormal) Collected: 09/18/23 0557    Specimen: Urine, Clean Catch Updated: 09/18/23 0612     RBC, UA Too  Numerous to Count /HPF      WBC, UA Too Numerous to Count /HPF      Bacteria, UA 4+ /HPF      Squamous Epithelial Cells, UA 0-2 /HPF      Hyaline Casts, UA 0-2 /LPF      Methodology Automated Microscopy    Urine Culture - Urine, Urine, Clean Catch [[id number]] Collected: 09/18/23 0557    Specimen: Urine, Clean Catch Updated: 09/18/23 0612    CANDIDA AURIS SCREEN - Swab, Axilla Right, Axilla Left and Groin [[id number]] Collected: 09/18/23 0535    Specimen: Swab from Axilla Right, Axilla Left and Groin Updated: 09/18/23 0550          Imaging Results (Last 72 Hours)       Procedure Component Value Units Date/Time    MRI abdomen wo contrast mrcp [[id number]] Collected: 09/18/23 1247     Updated: 09/18/23 1258    Narrative:      MRI ABDOMEN WO CONTRAST MRCP    Date of Exam: 9/18/2023 12:10 PM EDT    Indication: acute abdominal pain, elevated lipase, biliary dilation on CT.     Comparison: KUB 9/18/2023.    Technique:  Routine multiplanar/multisequence images of the abdomen were obtained with MRCP sequences without contrast administration.      Findings:  Multiple sequences are degraded by motion.    There is fusiform dilation of the common bile duct up to 12 mm, with abrupt transition at the ampulla. 3 tiny gallstones are seen within the distal common bile duct, best seen on series 18 image 11, measuring only a couple millimeters each. No   obstructing pancreatic or CBD mass lesion is identified. Intrahepatic bile ducts appear within normal limits. Pancreatic duct caliber is within normal limits. No evidence of pancreatic divisum.    A couple of stones seen within the pancreatic neck and cystic duct on both series 3 image 15 and series 10 image 18, measuring about 3 to 4 mm each..    Small quantity pericholecystic fluid is present, which may simply be related to ascites seen elsewhere within the upper abdomen. The gallbladder wall itself does not appear thickened..    Left renal cysts are present, the dominant at the lower  pole measuring about 4 cm. Tiny right renal cysts are also suggested.    The pancreas appears moderately atrophic and fatty replaced. The adrenals aren't within normal limits. A T2 hyperintense lesion at the lower pole of the spleen measuring 10 mm is statistically favored to represent a benign finding such as a cyst.    Small esophageal hiatal hernia is noted. There is mild posterior bibasilar atelectasis, greatest on the right, with trace right basilar pleural effusion. Heart size is within normal limits.    Advanced degenerative disc endplate changes are present in the mid lumbar spine. There is moderate lumbar dextroscoliotic curvature.      Impression:      1. Small gallstones are present. There is evidence of choledocholithiasis.  2. There is abrupt tapered narrowing of the common bile duct near the level of the ampulla, although no obstructing abnormality is seen. The findings raise the possibility of ampullary stenosis, with associated 12 mm fusiform common bile duct dilation.   Consider ERCP correlation.  3. Small quantity ascites is seen within the right upper quadrant of the abdomen. Pericholecystic fluid is present, although there is no gallbladder wall thickening. Cholecystitis cannot be excluded.  4. Trace right basilar pleural effusion. Mild posterior bibasilar atelectasis, greatest on the right.  5. Small esophageal hiatal hernia.  Per 6. Left renal cysts.  7. Moderate pancreatic atrophy.    Electronically Signed: Kaur Ward MD    9/18/2023 12:56 PM EDT    Workstation ID: [id number]    XR Chest 2 View [[id number]] Collected: 09/18/23 1148     Updated: 09/18/23 1151    Narrative:      XR CHEST 2 VW    Date of Exam: 9/18/2023 11:44 AM EDT    Indication: needed prior to MRI    Comparison: None available.    Findings:  No retained radiopaque foreign body is seen within the chest soft tissues. Suspected fibrotic changes in both lungs, greatest in the bases. No definite acute airspace disease. Benign  calcified granuloma in the right base no definite pleural effusion or   pneumothorax. Aortic calcific atherosclerosis is noted. There is mild asymmetric elevation of the right hemidiaphragm.      Impression:      Impression:  No unexpected retained radiopaque foreign body within the chest.      Electronically Signed: Kaur Ward MD    9/18/2023 11:49 AM EDT    Workstation ID: [id number]    XR Skull Lateral & Ap [[id number]] Collected: 09/18/23 1147     Updated: 09/18/23 1150    Narrative:      XR SKULL LATERAL AND AP    Date of Exam: 9/18/2023 11:44 AM EDT    Indication: needed prior to MRI    Comparison: None available.    Technique: 2 views of the skull were obtained.    Findings:  No unexpected retained radiopaque foreign body is seen within the skull or imaged neck. An artifact of a button, external to the patient, projects over the lower neck soft tissues on the lateral view. No suspicious osseous abnormalities. The patient is   edentulous. There degenerative changes of the cervical spine, most notable at C5-6      Impression:      Impression: No unexpected retained opaque foreign body is seen in the skull.    Electronically Signed: Kaur Ward MD    9/18/2023 11:48 AM EDT    Workstation ID: [id number]    XR Abdomen KUB [[id number]] Collected: 09/18/23 1145     Updated: 09/18/23 1149    Narrative:      XR ABDOMEN KUB    Date of Exam: 9/18/2023 11:43 AM EDT    Indication: needs to be done prior to MRI    Comparison: None available.    Findings:  Nonspecific but nonobstructive bowel gas pattern. Mild colonic stool burden. Excreted contrast material is seen within the urinary bladder. Moderate lumbar dextroscoliosis with spondylotic change. No acute osseous abnormality. No radiopaque urinary tract   stone. No unexpected retained radiopaque foreign body is seen within the abdominal soft tissues. Calcified granuloma seen within the right lung base. Fibrotic changes are suggested within the lung bases. Calcific  atherosclerosis is seen within the   pelvis and upper thighs.      Impression:      Impression:    1. No unexpected retained radiopaque foreign body is seen within the abdomen or pelvis      Electronically Signed: Kaur Ward MD    9/18/2023 11:46 AM EDT    Workstation ID: [id number]            I reviewed the patient's new clinical results.  I reviewed the patient's new imaging results and agree with the interpretation.  I reviewed the patient's other test results and agree with the interpretation      Assessment & Plan       Cholelithiasis    Pancreatitis    Leukocytosis    Renal insufficiency    Parkinson's disease dementia    Hypothyroidism (acquired)    Hyperlipidemia    Hypokalemia    Essential hypertension    History of coronary artery disease      88-year-old gentleman with apparent gallstone pancreatitis, choledocholithiasis.    I discussed with his wife the relevant biliary anatomy and pancreatic anatomy using pictures on the computer.  I discussed with her the rationale for removal of the gallbladder to prevent further passage of stones out of the gallbladder into the biliary tree.  Given his fairly frail state I also discussed with her that we would need to see how he tolerated having an ERCP prior to making any firm decisions about undergoing further anesthetic for cholecystectomy.  Will defer to gastroenterology for plans for ERCP.  We will continue to follow.    I discussed the patient's findings and my recommendations with patient, family, and Gastroenterology              This document has been electronically signed by Tip Manrique MD on September 18, 2023 15:36 EDT      Tip Manrique MD  09/18/23  15:36 EDT

## 2023-09-18 NOTE — H&P
"    [facility] Medicine Services      Patient Name: Magnus Talbot  : 1934  MRN: [id number]  Primary Care Physician:  Provider, No Known  Date of admission: 2023      Subjective      Chief Complaint: abdominal pain     History of Present Illness: Magnus Talbot is a 88 y.o. male nursing home resident with a past medical history of coronary artery disease status post PCI many years ago, Parkinson's disease with dementia, hypothyroidism, hyperlipidemia who presented to [facility] on 2023 upon transfer from [facility] emergency department where he presented complaining of abdominal pain.  Wife at bedside assisting with history.  Patient is awake and alert and cooperative but does not answer.  Wife at bedside reporting history. Wife reports he ate some chicken at 3 PM and subsequently the nursing home called her and told her about the abdominal pain and he was taken to [facility] ED.  He was afebrile, not tachycardic, BP within normal limits.  Diagnostics and labs from [facility] emergency department showed:WBC of 15.8 hemoglobin of 12.3, potassium 3.3 BUN 39 creatinine 1.22 glucose 237  lipase 4400 high-sensitivity troponin 4, lactate 2.5, EKG from [facility] facility showed sinus rhythm with a PVC, heart rate 99 right bundle branch block    CT abdomen pelvis with IV contrast per radiology:    \"1.  Cholelithiasis with distended gallbladder and questionable mild wall thickening.  Mild intrahepatic and extrahepatic biliary ductal dilatation.  Cholecystitis as well as [place] cholelithiasis cannot be excluded.  Consider further assessment with ultrasound or MRCP.  2.  Indeterminate hypoattenuating lesions in the right hepatic lobe.  This can also be further assessed with MRI without and with IV contrast.    Dr. Ma of gastroenterology was consulted from [facility] for ERCP and accepted consult.  He is n.p.o., IV hydration ordered, IV Zosyn ordered for leukocytosis " urinalysis ordered.  IV pain medication antiemetics ordered.  He will be admitted for further evaluation and treatment.  Review of Systems   Unable to perform ROS: Dementia      Personal History     Past Medical History:   Diagnosis Date    CAD (coronary artery disease)     Dementia     Parkinsons     Thyroid disease        Past Surgical History:   Procedure Laterality Date    LEFT HEART CATH         Family History: family history is not on file. Otherwise pertinent FHx was reviewed and not pertinent to current issue.    Social History:  reports that he has quit smoking. His smoking use included cigarettes. He does not have any smokeless tobacco history on file. He reports current alcohol use of about 7.0 standard drinks per week. He reports that he does not use drugs.    Home Medications:  Prior to Admission Medications       None              Allergies:  No Known Allergies    Objective      Vitals:   Temp:  [97.7 °F (36.5 °C)] 97.7 °F (36.5 °C)  Heart Rate:  [95] 95  Resp:  [15] 15  BP: (136)/(61) 136/61    Physical Exam  Vitals reviewed.   Constitutional:       Appearance: Normal appearance.   HENT:      Head: Normocephalic and atraumatic.      Right Ear: External ear normal.      Left Ear: External ear normal.      Nose: Nose normal.      Mouth/Throat:      Mouth: Mucous membranes are moist.   Eyes:      Extraocular Movements: Extraocular movements intact.   Cardiovascular:      Rate and Rhythm: Normal rate and regular rhythm.      Pulses: Normal pulses.      Heart sounds: Normal heart sounds.   Pulmonary:      Effort: Pulmonary effort is normal.      Breath sounds: Normal breath sounds.   Abdominal:      Palpations: Abdomen is soft.   Genitourinary:     Penis: Normal.       Testes: Normal.   Musculoskeletal:         General: Normal range of motion.      Cervical back: Normal range of motion and neck supple.   Skin:     General: Skin is warm and dry.      Comments: Skin tears bilateral forearms    Neurological:  "     General: No focal deficit present.      Mental Status: He is alert.      Comments: Dementia, awake , non verbal    Psychiatric:      Comments: Cooperative and pleasant awake non verbal        Result Review    Result Review:  I have personally reviewed the results from the time of this admission to 9/18/2023 03:50 EDT and agree with these findings:  [x]  Laboratory  []  Microbiology  [x]  Radiology  [x]  EKG/Telemetry   []  Cardiology/Vascular   []  Pathology  [x]  Old records  []  Other:  Most notable findings include: Labs from [facility] emergency department show a WBC of 15.8 hemoglobin of 12.3, potassium 3.3 BUN 39 creatinine 1.22 glucose 237  lipase 4400 high-sensitivity troponin 4, lactate 2.5, EKG from outlying facility showed sinus rhythm with a PVC, heart rate 99 right bundle branch block    CT abdomen pelvis with IV contrast per radiology:    \"1.  Cholelithiasis with distended gallbladder and questionable mild wall thickening.  Mild intrahepatic and extrahepatic biliary ductal dilatation.  Cholecystitis as well as Choledocholelithiasis cannot be excluded.  Consider further assessment with ultrasound or MRCP.  2.  Indeterminate hypoattenuating lesions in the right hepatic lobe.  This can also be further assessed with MRI without and with IV contrast.\"      Assessment & Plan        Active Hospital Problems:  Active Hospital Problems    Diagnosis     **Cholelithiasis     Pancreatitis     Leukocytosis     Renal insufficiency     Hypokalemia     Parkinson's disease dementia     Hypothyroidism (acquired)     Hyperlipidemia     Essential hypertension     History of coronary artery disease      Plan:     Pancreatitis, lipase 4400 likely secondary to cholelithiasis and biliary ductal dilatation per CT scan, gastroenterology consulted for ERCP, n.p.o., 0.5 mg IV Dilaudid every 4 hours pain, 4 mg IV Zofran every 6 hours nausea  Normal saline at 100 cc/h    Leukocytosis WBC 15.8, urinalysis ordered and " pending, pharmacy to dose IV Zosyn for cholecystitis    Renal insufficiency creatinine 1.22, BUN 39, no labs for comparison, avoid nephrotoxic meds pharmacy to dose Zosyn trend renal function normal saline at 100 cc/h    Hypokalemia potassium 3.3, placement protocol in place    Parkinson's disease with dementia, was on home carbidopa levodopa, donepezil and memantine, current meds unverified dosages unknown reorder pending verification on n.p.o. status    Hypothyroidism, home meds unverified at this time reorder pending verification pharmacy    Hyperlipidemia, home meds unverified at this time reorder pending verification pharmacy    Essential hypertension, was on home atenolol meds unverified at this time reorder pending verification from pharmacy    Medical history CAD with PCI remotely, no complaints of chest pain continuous cardiac monitoring hold home aspirin for now    DVT prophylaxis:  Mechanical DVT prophylaxis orders are present.    CODE STATUS:    Code Status (Patient has no pulse and is not breathing): CPR (Attempt to Resuscitate)  Medical Interventions (Patient has pulse or is breathing): Full Support    Admission Status:  I believe this patient meets observation  status.    I discussed the patient's findings and my recommendations with patient.  And wife at bedside    This patient has been examined wearing appropriate Personal Protective Equipment     . 09/18/23      Signature: Electronically signed by LUI Eckert, 09/18/23, 3:50 AM EDT.

## 2023-09-18 NOTE — H&P
GI CONSULT  NOTE:    Referring Provider:    Tip Parikh MD  [unfilled]    Chief complaint: Cholelithiasis    Subjective .       Pre op diagnosis  Choledocholithiasis [K80.50]  Acute biliary pancreatitis, unspecified complication status [K85.10]        History of present illness:      Magnus Talbot is a 88 y.o. male who presents today for Procedure(s):  ENDOSCOPIC RETROGRADE CHOLANGIOPANCREATOGRAPHY for the indications listed below.     The updated Patient Profile was reviewed prior to the procedure, in conjunction with the Physical Exam, including medical conditions, surgical procedures, medications, allergies, family history and social history.     Pre-operatively, I reviewed the indication(s) for the procedure, the risks of the procedure [including but not limited to: unexpected bleeding possibly requiring hospitalization and/or unplanned repeat procedures, perforation possibly requiring surgical treatment, missed lesions and complications of sedation/MAC (also explained by anesthesia staff)].     I have evaluated the patient for risks associated with the planned anesthesia and the procedure to be performed and find the patient an acceptable candidate for IV sedation.    Multiple opportunities were provided for any questions or concerns, and all questions were answered satisfactorily before any anesthesia was administered. We will proceed with the planned procedure.    Past Medical History:  Past Medical History:   Diagnosis Date    CAD (coronary artery disease)     Dementia     Parkinsons     Thyroid disease        Past Surgical History:  Past Surgical History:   Procedure Laterality Date    LEFT HEART CATH         Social History:  Social History     Tobacco Use    Smoking status: Former     Types: Cigarettes   Vaping Use    Vaping Use: Never used   Substance Use Topics    Alcohol use: Yes     Alcohol/week: 7.0 standard drinks     Types: 7 Glasses of wine per week    Drug use: No       Family  History:  History reviewed. No pertinent family history.    Medications:  Medications Prior to Admission   Medication Sig Dispense Refill Last Dose    acetaminophen (TYLENOL) 500 MG tablet Take 1 tablet by mouth 1 (One) Time.   9/17/2023    aspirin 81 MG EC tablet Take 1 tablet by mouth Daily.   9/17/2023    atenolol (TENORMIN) 25 MG tablet Take 1 tablet by mouth Daily.   9/17/2023    carbidopa-levodopa (SINEMET)  MG per tablet Take 1 tablet by mouth 3 (Three) Times a Day.   9/17/2023    donepezil (ARICEPT) 10 MG tablet Take 1 tablet by mouth Every Night.   9/17/2023    doxazosin (CARDURA) 4 MG tablet Take 1 tablet by mouth Every Night.   9/17/2023    finasteride (PROSCAR) 5 MG tablet Take 1 tablet by mouth Daily.   9/17/2023    gabapentin (NEURONTIN) 300 MG capsule Take 1 capsule by mouth 3 (Three) Times a Day.   9/17/2023    hydroCHLOROthiazide (HYDRODIURIL) 12.5 MG tablet Take 1 tablet by mouth Daily.   9/17/2023    melatonin 5 MG tablet tablet Take 1 tablet by mouth.   9/17/2023    memantine (NAMENDA) 10 MG tablet Take 1 tablet by mouth 2 (Two) Times a Day.   9/17/2023    naproxen sodium (ALEVE) 220 MG tablet Take 1 tablet by mouth 2 (Two) Times a Day As Needed.   9/17/2023    omeprazole (priLOSEC) 20 MG capsule Take 1 capsule by mouth Daily.   9/17/2023    vitamin B-12 (CYANOCOBALAMIN) 1000 MCG tablet Take 1 tablet by mouth Daily.   9/17/2023       Scheduled Meds:piperacillin-tazobactam, 3.375 g, Intravenous, Q8H      Continuous Infusions:Pharmacy to Dose Zosyn,   sodium chloride, 150 mL/hr, Last Rate: 150 mL/hr (09/18/23 1627)      PRN Meds:.  HYDROmorphone    ondansetron    Pharmacy to Dose Zosyn    Potassium Replacement - Follow Nurse / BPA Driven Protocol    ALLERGIES:  Patient has no known allergies.    ROS:  The following systems were reviewed and negative;  Constitution:  No fevers, chills, no unintentional weight loss  Skin: no rash, no jaundice  Eyes:  No blurry vision, no eye pain  HENT:  No  "change in hearing or smell  Resp:  No dyspnea or cough  CV:  No chest pain or palpitations  :  No dysuria, hematuria  Musculoskeletal:  No leg cramps or arthralgias  Neuro:  No tremor, no numbness  Psych:  No depression or confsusion    Objective     Vital Signs:   Vitals:    09/18/23 0332 09/18/23 0752 09/18/23 1622 09/18/23 1645   BP: 136/61 115/55 129/62 94/47   BP Location:  Right arm Right arm Right arm   Patient Position:  Lying Lying Lying   Pulse: 95 104 100 100   Resp: 15 21 20 20   Temp: 97.7 °F (36.5 °C) 97.9 °F (36.6 °C) 99.2 °F (37.3 °C) 98.7 °F (37.1 °C)   TempSrc: Axillary Oral Axillary Oral   SpO2: 96% 100% 92% 93%   Weight: 72.8 kg (160 lb 7.9 oz)      Height: 167.6 cm (66\")          Physical Exam:       General Appearance:    Awake and alert, in no acute distress   Head:    Normocephalic, without obvious abnormality, atraumatic   Throat:   No oral lesions, no thrush, oral mucosa moist   Lungs:     respirations regular, even and unlabored   Skin:   No rash, no jaundice       Results Review:  Lab Results (last 24 hours)       Procedure Component Value Units Date/Time    C-reactive Protein [[id number]]  (Abnormal) Collected: 09/18/23 0717    Specimen: Blood Updated: 09/18/23 0956     C-Reactive Protein 5.55 mg/dL     Lipase [[id number]]  (Abnormal) Collected: 09/18/23 0717    Specimen: Blood Updated: 09/18/23 0850     Lipase 474 U/L     Protime-INR [[id number]]  (Normal) Collected: 09/18/23 0717    Specimen: Blood Updated: 09/18/23 0824     Protime 11.2 Seconds      INR 1.05    Comprehensive Metabolic Panel [[id number]]  (Abnormal) Collected: 09/18/23 0717    Specimen: Blood Updated: 09/18/23 0810     Glucose 229 mg/dL      BUN 47 mg/dL      Creatinine 1.26 mg/dL      Sodium 140 mmol/L      Potassium 3.7 mmol/L      Chloride 101 mmol/L      CO2 19.0 mmol/L      Calcium 8.9 mg/dL      Total Protein 6.0 g/dL      Albumin 3.5 g/dL      ALT (SGPT) 87 U/L      AST (SGOT) 172 U/L      Alkaline Phosphatase 86 " U/L      Total Bilirubin 0.9 mg/dL      Globulin 2.5 gm/dL      A/G Ratio 1.4 g/dL      BUN/Creatinine Ratio 37.3     Anion Gap 20.0 mmol/L      eGFR 54.9 mL/min/1.73     Narrative:      GFR Normal >60  Chronic Kidney Disease <60  Kidney Failure <15    The GFR formula is only valid for adults with stable renal function between ages 18 and 70.    Sedimentation Rate [[id number]]  (Normal) Collected: 09/18/23 0717    Specimen: Blood Updated: 09/18/23 0755     Sed Rate 14 mm/hr     CBC & Differential [[id number]]  (Abnormal) Collected: 09/18/23 0717    Specimen: Blood Updated: 09/18/23 0744    Narrative:      The following orders were created for panel order CBC & Differential.  Procedure                               Abnormality         Status                     ---------                               -----------         ------                     CBC Auto Differential[[id number]]        Abnormal            Final result                 Please view results for these tests on the individual orders.    CBC Auto Differential [[id number]]  (Abnormal) Collected: 09/18/23 0717    Specimen: Blood Updated: 09/18/23 0744     WBC 2.80 10*3/mm3      RBC 3.60 10*6/mm3      Hemoglobin 12.6 g/dL      Hematocrit 37.6 %      .3 fL      MCH 34.9 pg      MCHC 33.5 g/dL      RDW 14.1 %      RDW-SD 54.7 fl      MPV 8.0 fL      Platelets 150 10*3/mm3      Neutrophil % 95.6 %      Lymphocyte % 2.4 %      Monocyte % 1.9 %      Eosinophil % 0.0 %      Basophil % 0.1 %      Neutrophils, Absolute 2.60 10*3/mm3      Lymphocytes, Absolute 0.10 10*3/mm3      Monocytes, Absolute 0.10 10*3/mm3      Eosinophils, Absolute 0.00 10*3/mm3      Basophils, Absolute 0.00 10*3/mm3      nRBC 0.2 /100 WBC     Urinalysis With Culture If Indicated - Urine, Clean Catch [[id number]]  (Abnormal) Collected: 09/18/23 0557    Specimen: Urine, Clean Catch Updated: 09/18/23 0612     Color, UA Dark Yellow     Appearance, UA Cloudy     Comment: Result checked           pH, UA <=5.0     Specific Gravity, UA 1.041     Glucose,  mg/dL (1+)     Ketones, UA Negative     Bilirubin, UA Negative     Blood, UA Large (3+)     Protein, UA 30 mg/dL (1+)     Leuk Esterase, UA Moderate (2+)     Nitrite, UA Positive     Urobilinogen, UA 1.0 E.U./dL    Narrative:      In absence of clinical symptoms, the presence of pyuria, bacteria, and/or nitrites on the urinalysis result does not correlate with infection.    Urinalysis, Microscopic Only - Urine, Clean Catch [[id number]]  (Abnormal) Collected: 09/18/23 0557    Specimen: Urine, Clean Catch Updated: 09/18/23 0612     RBC, UA Too Numerous to Count /HPF      WBC, UA Too Numerous to Count /HPF      Bacteria, UA 4+ /HPF      Squamous Epithelial Cells, UA 0-2 /HPF      Hyaline Casts, UA 0-2 /LPF      Methodology Automated Microscopy    Urine Culture - Urine, Urine, Clean Catch [[id number]] Collected: 09/18/23 0557    Specimen: Urine, Clean Catch Updated: 09/18/23 0612    CANDIDA AURIS SCREEN - Swab, Axilla Right, Axilla Left and Groin [[id number]] Collected: 09/18/23 0535    Specimen: Swab from Axilla Right, Axilla Left and Groin Updated: 09/18/23 0550            Imaging Results (Last 24 Hours)       Procedure Component Value Units Date/Time    MRI abdomen wo contrast mrcp [[id number]] Collected: 09/18/23 1247     Updated: 09/18/23 1258    Narrative:      MRI ABDOMEN WO CONTRAST MRCP    Date of Exam: 9/18/2023 12:10 PM EDT    Indication: acute abdominal pain, elevated lipase, biliary dilation on CT.     Comparison: KUB 9/18/2023.    Technique:  Routine multiplanar/multisequence images of the abdomen were obtained with MRCP sequences without contrast administration.      Findings:  Multiple sequences are degraded by motion.    There is fusiform dilation of the common bile duct up to 12 mm, with abrupt transition at the ampulla. 3 tiny gallstones are seen within the distal common bile duct, best seen on series 18 image 11, measuring only a couple  millimeters each. No   obstructing pancreatic or CBD mass lesion is identified. Intrahepatic bile ducts appear within normal limits. Pancreatic duct caliber is within normal limits. No evidence of pancreatic divisum.    A couple of stones seen within the pancreatic neck and cystic duct on both series 3 image 15 and series 10 image 18, measuring about 3 to 4 mm each..    Small quantity pericholecystic fluid is present, which may simply be related to ascites seen elsewhere within the upper abdomen. The gallbladder wall itself does not appear thickened..    Left renal cysts are present, the dominant at the lower pole measuring about 4 cm. Tiny right renal cysts are also suggested.    The pancreas appears moderately atrophic and fatty replaced. The adrenals aren't within normal limits. A T2 hyperintense lesion at the lower pole of the spleen measuring 10 mm is statistically favored to represent a benign finding such as a cyst.    Small esophageal hiatal hernia is noted. There is mild posterior bibasilar atelectasis, greatest on the right, with trace right basilar pleural effusion. Heart size is within normal limits.    Advanced degenerative disc endplate changes are present in the mid lumbar spine. There is moderate lumbar dextroscoliotic curvature.      Impression:      1. Small gallstones are present. There is evidence of choledocholithiasis.  2. There is abrupt tapered narrowing of the common bile duct near the level of the ampulla, although no obstructing abnormality is seen. The findings raise the possibility of ampullary stenosis, with associated 12 mm fusiform common bile duct dilation.   Consider ERCP correlation.  3. Small quantity ascites is seen within the right upper quadrant of the abdomen. Pericholecystic fluid is present, although there is no gallbladder wall thickening. Cholecystitis cannot be excluded.  4. Trace right basilar pleural effusion. Mild posterior bibasilar atelectasis, greatest on the  right.  5. Small esophageal hiatal hernia.  Per 6. Left renal cysts.  7. Moderate pancreatic atrophy.    Electronically Signed: Kaur Ward MD    9/18/2023 12:56 PM EDT    Workstation ID: [id number]    XR Chest 2 View [[id number]] Collected: 09/18/23 1148     Updated: 09/18/23 1151    Narrative:      XR CHEST 2 VW    Date of Exam: 9/18/2023 11:44 AM EDT    Indication: needed prior to MRI    Comparison: None available.    Findings:  No retained radiopaque foreign body is seen within the chest soft tissues. Suspected fibrotic changes in both lungs, greatest in the bases. No definite acute airspace disease. Benign calcified granuloma in the right base no definite pleural effusion or   pneumothorax. Aortic calcific atherosclerosis is noted. There is mild asymmetric elevation of the right hemidiaphragm.      Impression:      Impression:  No unexpected retained radiopaque foreign body within the chest.      Electronically Signed: Kaur Ward MD    9/18/2023 11:49 AM EDT    Workstation ID: [id number]    XR Skull Lateral & Ap [[id number]] Collected: 09/18/23 1147     Updated: 09/18/23 1150    Narrative:      XR SKULL LATERAL AND AP    Date of Exam: 9/18/2023 11:44 AM EDT    Indication: needed prior to MRI    Comparison: None available.    Technique: 2 views of the skull were obtained.    Findings:  No unexpected retained radiopaque foreign body is seen within the skull or imaged neck. An artifact of a button, external to the patient, projects over the lower neck soft tissues on the lateral view. No suspicious osseous abnormalities. The patient is   edentulous. There degenerative changes of the cervical spine, most notable at C5-6      Impression:      Impression: No unexpected retained opaque foreign body is seen in the skull.    Electronically Signed: Kaur Ward MD    9/18/2023 11:48 AM EDT    Workstation ID: [id number]    XR Abdomen KUB [[id number]] Collected: 09/18/23 1145     Updated: 09/18/23 1149    Narrative:       XR ABDOMEN KUB    Date of Exam: 9/18/2023 11:43 AM EDT    Indication: needs to be done prior to MRI    Comparison: None available.    Findings:  Nonspecific but nonobstructive bowel gas pattern. Mild colonic stool burden. Excreted contrast material is seen within the urinary bladder. Moderate lumbar dextroscoliosis with spondylotic change. No acute osseous abnormality. No radiopaque urinary tract   stone. No unexpected retained radiopaque foreign body is seen within the abdominal soft tissues. Calcified granuloma seen within the right lung base. Fibrotic changes are suggested within the lung bases. Calcific atherosclerosis is seen within the   pelvis and upper thighs.      Impression:      Impression:    1. No unexpected retained radiopaque foreign body is seen within the abdomen or pelvis      Electronically Signed: Kaur Ward MD    9/18/2023 11:46 AM EDT    Workstation ID: [id number]             I reviewed the patient's labs and imaging.    ASSESSMENT AND PLAN:      Principal Problem:    Cholelithiasis  Active Problems:    Pancreatitis    Leukocytosis    Renal insufficiency    Parkinson's disease dementia    Hypothyroidism (acquired)    Hyperlipidemia    Hypokalemia    Essential hypertension    History of coronary artery disease    Choledocholithiasis       Procedure(s):  ENDOSCOPIC RETROGRADE CHOLANGIOPANCREATOGRAPHY    The risks benefits and alternatives were discussed with the patient's family and the patient including a 7 to 10% risk of post ERCP pancreatitis, bleeding, infection, perforation.  The family and patient agreed to proceed and signed the corresponding consent form.      I discussed the patient's findings and my recommendations with the patient.    Ray Hitchcock MD  09/18/23  17:04 EDT

## 2023-09-18 NOTE — CASE MANAGEMENT/SOCIAL WORK
Discharge Planning Assessment  [facility]     Patient Name: Magnus Talbot  MRN: [id number]  Today's Date: 9/18/2023    Admit Date: 9/18/2023    Plan: Return to [facility]. No precert required. No PASRR needed (return to SNF).   Discharge Needs Assessment       Row Name 09/18/23 1132       Living Environment    People in Home facility resident  [facility]    Current Living Arrangements extended care facility    Duration at Residence ~2 months    Potentially Unsafe Housing Conditions none    Primary Care Provided by spouse/significant other;other (see comments)  Nursing home staff    Provides Primary Care For no one, unable/limited ability to care for self    Family Caregiver if Needed spouse    Family Caregiver Names Lata    Quality of Family Relationships helpful;involved;supportive    Able to Return to Prior Arrangements yes       Resource/Environmental Concerns    Resource/Environmental Concerns none    Transportation Concerns none       Transition Planning    Patient/Family Anticipates Transition to long-term care facility    Patient/Family Anticipated Services at Transition skilled nursing    Transportation Anticipated family or friend will provide;health plan transportation       Discharge Needs Assessment    Readmission Within the Last 30 Days no previous admission in last 30 days    Current Outpatient/Agency/Support Group skilled nursing facility    Equipment Currently Used at Home walker, rolling;wheelchair;bp cuff    Concerns to be Addressed denies needs/concerns at this time;no discharge needs identified    Anticipated Changes Related to Illness none    Equipment Needed After Discharge none    Outpatient/Agency/Support Group Needs skilled nursing facility    Discharge Facility/Level of Care Needs nursing facility, intermediate    Provided Post Acute Provider List? N/A    Provided Post Acute Provider Quality & Resource List? N/A                   Discharge Plan       Row Name  09/18/23 1135       Plan    Plan Return to [facility]. No precert required. No PASRR needed (return to SNF).    Patient/Family in Agreement with Plan yes    Plan Comments CM met with patient and wife Lata at bedside. Pt is from [facility] and plan is to return at discharge. PCP is Dr Tip Parikh, who sees patient at facility. Updated in chart. Pharmacy up to date as [facility]. DME includes walker, wheelchair, and BP cuff. Wife reports she usually provides transportation for pt and plans to do so at discharge. Liaison Debbi confirms pt is [facility] resident and good to return to facility.                  Continued Care and Services - Admitted Since 9/18/2023       Destination       Service Provider Request Status Selected Services Address Phone Fax Patient Preferred    [facility] Pending - Request Sent N/A [phone], [place] [address] [phone]                   Expected Discharge Date and Time       Expected Discharge Date Expected Discharge Time    Sep 20, 2023            Demographic Summary       Row Name 09/18/23 1131       General Information    Admission Type observation    Arrived From hospital  [facility]    Referral Source admission list    Reason for Consult care coordination/care conference;discharge planning    Preferred Language English       Contact Information    Permission Granted to Share Info With                    Functional Status       Row Name 09/18/23 1132       Functional Status    Usual Activity Tolerance moderate    Current Activity Tolerance moderate       Functional Status, IADL    Medications assistive equipment and person    Meal Preparation assistive equipment and person    Housekeeping assistive equipment and person    Laundry assistive equipment and person    Shopping assistive equipment and person             Megan Naegele, RN     Office Phone: [phone]  Office Cell:  [phone]

## 2023-09-18 NOTE — ANESTHESIA PROCEDURE NOTES
Airway  Urgency: elective    Date/Time: 9/18/2023 5:02 PM  Airway not difficult    General Information and Staff    Patient location during procedure: OR  Anesthesiologist: Sumit Thompson MD  CRNA/CAA: Trena Gupta CRNA    Indications and Patient Condition  Indications for airway management: airway protection    Preoxygenated: yes  MILS maintained throughout  Mask difficulty assessment: 0 - not attempted    Final Airway Details  Final airway type: endotracheal airway      Successful airway: ETT  Cuffed: yes   Successful intubation technique: video laryngoscopy  Facilitating devices/methods: intubating stylet  Endotracheal tube insertion site: oral  Blade: Engle  Blade size: 4  ETT size (mm): 7.5  Cormack-Lehane Classification: grade I - full view of glottis  Placement verified by: chest auscultation and capnometry   Cuff volume (mL): 10  Measured from: lips  ETT/EBT  to lips (cm): 22  Number of attempts at approach: 1  Assessment: lips, teeth, and gum same as pre-op and atraumatic intubation    Additional Comments  Old blood noted in hypopharynx on arytenoids and cords

## 2023-09-18 NOTE — PLAN OF CARE
Goal Outcome Evaluation:              Outcome Evaluation: PT admitted from [facility] with cholelithiasis with pancreatitis.  GI consulted.  Fluids running.  Waiting on urine to come back.  Pt stable at this time.

## 2023-09-18 NOTE — CONSULTS
GI CONSULT  NOTE:    Referring Provider:  Dr. Herrera    Chief complaint: Pancreatitis, cholelithiasis, needs ERCP    Subjective .  Unable to assess secondary to mental status    History of present illness: Magnus Talbot is a 88 y.o. male who has a history of CAD/stent, Parkinson's disease, dementia and thyroid disease.  He presents from the nursing facility with acute onset epigastric pain with nausea and vomiting.  Unfortunately, patient is unable to provide history with underlying dementia and lethargy after pain medication.  Family at bedside helps with recent history.  She reports acute epigastric pain with nausea and vomiting once at the nursing facility.  Questionable dark emesis.  No known history of liver disease or elevated LFTs.  He did recently have a mild fever, reports negative testing for COVID after an exposure.  Unfortunately, no other history is obtainable.    Endo History:  Unknown    Past Medical History:  Past Medical History:   Diagnosis Date    CAD (coronary artery disease)     Dementia     Parkinsons     Thyroid disease        Past Surgical History:  Past Surgical History:   Procedure Laterality Date    LEFT HEART CATH         Social History:  Social History     Tobacco Use    Smoking status: Former     Types: Cigarettes   Vaping Use    Vaping Use: Never used   Substance Use Topics    Alcohol use: Yes     Alcohol/week: 7.0 standard drinks     Types: 7 Glasses of wine per week    Drug use: No       Family History:  History reviewed. No pertinent family history.    Medications:  Medications Prior to Admission   Medication Sig Dispense Refill Last Dose    acetaminophen (TYLENOL) 500 MG tablet Take 1 tablet by mouth 1 (One) Time.   9/17/2023    aspirin 81 MG EC tablet Take 1 tablet by mouth Daily.   9/17/2023    atenolol (TENORMIN) 25 MG tablet Take 1 tablet by mouth Daily.   9/17/2023    carbidopa-levodopa (SINEMET)  MG per tablet Take 1 tablet by mouth 3 (Three) Times a Day.   9/17/2023     "donepezil (ARICEPT) 10 MG tablet Take 1 tablet by mouth Every Night.   9/17/2023    doxazosin (CARDURA) 4 MG tablet Take 1 tablet by mouth Every Night.   9/17/2023    finasteride (PROSCAR) 5 MG tablet Take 1 tablet by mouth Daily.   9/17/2023    gabapentin (NEURONTIN) 300 MG capsule Take 1 capsule by mouth 3 (Three) Times a Day.   9/17/2023    hydroCHLOROthiazide (HYDRODIURIL) 12.5 MG tablet Take 1 tablet by mouth Daily.   9/17/2023    melatonin 5 MG tablet tablet Take 1 tablet by mouth.   9/17/2023    memantine (NAMENDA) 10 MG tablet Take 1 tablet by mouth 2 (Two) Times a Day.   9/17/2023    naproxen sodium (ALEVE) 220 MG tablet Take 1 tablet by mouth 2 (Two) Times a Day As Needed.   9/17/2023    omeprazole (priLOSEC) 20 MG capsule Take 1 capsule by mouth Daily.   9/17/2023    vitamin B-12 (CYANOCOBALAMIN) 1000 MCG tablet Take 1 tablet by mouth Daily.   9/17/2023       Scheduled Meds:piperacillin-tazobactam, 3.375 g, Intravenous, Q8H      Continuous Infusions:Pharmacy to Dose Zosyn,   sodium chloride, 150 mL/hr, Last Rate: 150 mL/hr (09/18/23 1016)      PRN Meds:.  HYDROmorphone    ondansetron    Pharmacy to Dose Zosyn    Potassium Replacement - Follow Nurse / BPA Driven Protocol    ALLERGIES:  Patient has no known allergies.    ROS:  Unable to obtain    Objective chronically ill-appearing but no acute distress    Vital Signs:   Vitals:    09/18/23 0332 09/18/23 0752   BP: 136/61 115/55   BP Location:  Right arm   Patient Position:  Lying   Pulse: 95 104   Resp: 15 21   Temp: 97.7 °F (36.5 °C) 97.9 °F (36.6 °C)   TempSrc: Axillary Oral   SpO2: 96% 100%   Weight: 72.8 kg (160 lb 7.9 oz)    Height: 167.6 cm (66\")        Physical Exam:     General Appearance:  Lethargic but opens eyes with exam, in no acute distress   Head:    Normocephalic, without obvious abnormality, atraumatic   Throat:   No oral lesions, no thrush, oral mucosa moist   Lungs:     Respirations regular, even and unlabored   Chest Wall:    No " abnormalities observed   Abdomen:     Soft, nontender, nondistended   Rectal:     Deferred   Extremities:   Moves all extremities, no cyanosis       Skin:   No rash, no jaundice, normal palpation       Neurologic: Lethargic       Results Review:   I reviewed the patient's labs and imaging.  CBC    Results from last 7 days   Lab Units 09/18/23  0717   WBC 10*3/mm3 2.80*   HEMOGLOBIN g/dL 12.6*   PLATELETS 10*3/mm3 150     CMP   Results from last 7 days   Lab Units 09/18/23  0717   SODIUM mmol/L 140   POTASSIUM mmol/L 3.7   CHLORIDE mmol/L 101   CO2 mmol/L 19.0*   BUN mg/dL 47*   CREATININE mg/dL 1.26   GLUCOSE mg/dL 229*   ALBUMIN g/dL 3.5   BILIRUBIN mg/dL 0.9   ALK PHOS U/L 86   AST (SGOT) U/L 172*   ALT (SGPT) U/L 87*   LIPASE U/L 474*     Cr Clearance Estimated Creatinine Clearance: 41.7 mL/min (by C-G formula based on SCr of 1.26 mg/dL).  Coag   Results from last 7 days   Lab Units 09/18/23  0717   INR  1.05     HbA1C No results found for: HGBA1C  Blood Glucose No results found for: POCGLU  Infection     UA    Results from last 7 days   Lab Units 09/18/23  0557   NITRITE UA  Positive*   WBC UA /HPF Too Numerous to Count*   BACTERIA UA /HPF 4+*   SQUAM EPITHEL UA /HPF 0-2     Radiology(recent) No radiology results for the last day       ASSESSMENT:  Acute epigastric pain with nausea and vomiting  Elevated LFTs  Elevated lipase-improving  Mild macrocytic anemia  UTI  Parkinson's disease/dementia  History of CAD/stent    PLAN:  Patient is an 88-year-old male with Parkinson's dementia and CAD/stent who presents from the nursing facility with acute onset epigastric pain with nausea and vomiting.  [facility] with distended gallbladder and mild biliary dilation.  Mildly elevated AST and ALT today [facility]  Concern for passed vs retained CBD stone with associated pancreatitis.  We will proceed with MRCP today for further evaluation. as he has high risk for  ERCP secondary to age.  Empirically on Zosyn.  Continue supportive care and we will follow.  Discussed with bedside RN this morning on rounds.    I discussed the patients findings and my recommendations with the patient.    We appreciate the referral    Electronically signed by LUI Cooper, 09/18/23, 11:11 AM EDT.

## 2023-09-18 NOTE — SIGNIFICANT NOTE
Went to see the patient but patient stone and MRCP right now.  Continue Zosyn for intra-abdominal infection as well as UTI.  Continue pain control.  Increase IV fluids 250 cc an hour secondary to pancreatitis.  Pain control.  We will follow-up on MRCP results.  We will see patient again tomorrow.

## 2023-09-18 NOTE — PLAN OF CARE
Goal Outcome Evaluation:   Patient was brought in from [facility]. Patient has cholelithiasis GI and general surgery have been consulted. MRI was ordered but KUB, chest xray and skull xray were needed prior to the MRI. Patient has left the floor for scans.

## 2023-09-18 NOTE — DISCHARGE PLACEMENT REQUEST
"Magnus Bullock (88 y.o. Male)       Date of Birth   1934    Social Security Number       Address   [address] [place] [address]    Home Phone   [phone]    MRN   [id number]       Cheondoism   None    Marital Status                               Admission Date   9/18/23    Admission Type   Emergency    Admitting Provider   Jessica Roach MD    Attending Provider   Radha eHrrera MD    Department, Room/Bed   [facility] 3C MEDICAL INPATIENT, 362/1       Discharge Date       Discharge Disposition       Discharge Destination                                 Attending Provider: Radha Herrera MD    Allergies: No Known Allergies    Isolation: Contact   Infection: Candida Auris (rule out) (09/18/23)   Code Status: CPR    Ht: 167.6 cm (66\")   Wt: 72.8 kg (160 lb 7.9 oz)    Admission Cmt: None   Principal Problem: Cholelithiasis [K80.20]                   Active Insurance as of 9/18/2023       Primary Coverage       Payor Plan Insurance Group Employer/Plan Group    MEDICARE MEDICARE A & B        Payor Plan Address Payor Plan Phone Number Payor Plan Fax Number Effective Dates    [address] [phone]  9/1/2023 - None Entered    [place] [id number]         Subscriber Name Subscriber Birth Date Member ID       MAGNUS BULLOCK 1934 [id number]               Secondary Coverage       Payor Plan Insurance Group Employer/Plan Group    AETNA COMMERCIAL AETNA  [employer] SU[employer]        Payor Plan Address Payor Plan Phone Number Payor Plan Fax Number Effective Dates    [address] [phone]  9/17/2023 - None Entered    [place] [address]         Subscriber Name Subscriber Birth Date Member ID       MAGNUS BULLOCK 1934 [id number]                     Emergency Contacts        (Rel.) Home Phone Work Phone Mobile Phone    hernando bullock (Spouse) -- -- [phone]          "

## 2023-09-18 NOTE — ANESTHESIA PREPROCEDURE EVALUATION
Anesthesia Evaluation     NPO Solid Status: > 8 hours  NPO Liquid Status: > 8 hours           Airway   Mallampati: II  TM distance: >3 FB  Neck ROM: full  No difficulty expected  Dental - normal exam     Pulmonary - normal exam   Cardiovascular - normal exam    (+) hypertension, CAD, hyperlipidemia      Neuro/Psych  (+) Parkinson's disease, psychiatric history, dementia  GI/Hepatic/Renal/Endo    (+) renal disease, thyroid problem hypothyroidism    Musculoskeletal     Abdominal  - normal exam    Bowel sounds: normal.   Substance History      OB/GYN          Other                      Anesthesia Plan    ASA 3     general     intravenous induction     Anesthetic plan, risks, benefits, and alternatives have been provided, discussed and informed consent has been obtained with: patient.  Pre-procedure education provided  Plan discussed with CRNA.    CODE STATUS:    Code Status (Patient has no pulse and is not breathing): CPR (Attempt to Resuscitate)  Medical Interventions (Patient has pulse or is breathing): Full Support

## 2023-09-19 NOTE — ANESTHESIA POSTPROCEDURE EVALUATION
Patient: Magnus Talbot    Procedure Summary       Date: 09/18/23 Room / [facility]: [facility] END[facility]COPY [facility]    Anesthesia Start: 1657 Anesthesia Stop: 1836    Procedure: ENDOSCOPIC RETROGRADE CHOLANGIOPANCREATOGRAPHY, SPHINTEROTOMY, SPHINTEROPLASTY, CLEARANCE OF BILE DUCT WITH BALLOON (12MM-15MM, UP TO 15MM) EXTRACTION OF BILIARY STONES WITH BALLOON , OCCLUSION CHOLANGIOGRAM,  PLACEMENT OF BILIARY STENT, ESOPHAGOGASTRODUODNESCOPY Diagnosis:       Choledocholithiasis      Acute biliary pancreatitis, unspecified complication status      (Choledocholithiasis [K80.50])      (Acute biliary pancreatitis, unspecified complication status [K85.10])    Surgeons: Ray Hitchcock MD Provider: Sumit Thompson MD    Anesthesia Type: general ASA Status: 3            Anesthesia Type: general    Vitals  Vitals Value Taken Time   BP 87/45 09/18/23 1927   Temp     Pulse 93 09/18/23 1931   Resp 16 09/18/23 1917   SpO2 93 % 09/18/23 1931   Vitals shown include unvalidated device data.        Post Anesthesia Care and Evaluation    Patient location during evaluation: PACU  Patient participation: complete - patient participated  Level of consciousness: awake  Pain scale: See nurse's notes for pain score.  Pain management: adequate    Airway patency: patent  Anesthetic complications: No anesthetic complications  PONV Status: none  Cardiovascular status: acceptable  Respiratory status: acceptable and spontaneous ventilation  Hydration status: acceptable    Comments: Patient seen and examined postoperatively; vital signs stable; SpO2 greater than or equal to 90%; cardiopulmonary status stable; nausea/vomiting adequately controlled; pain adequately controlled; no apparent anesthesia complications; patient discharged from anesthesia care when discharge criteria were met

## 2023-09-19 NOTE — PLAN OF CARE
#Agitation     -Patient agitated and pulling at lines, not following commands, interfering with care    - Responded well to Haldol earlier in the day    - will not give Ativan 2/2 possible respiratory compromise    - Will order Haldol 1mg IV for patient safety and care     - cardiac monitoring, watch for qt prolongation    Electronically signed by Emmie Carcamo DO, 09/19/23, 8:00 PM EDT.

## 2023-09-19 NOTE — PROGRESS NOTES
GENERAL SURGERY PROGRESS NOTE  Chief Complaint:  Surgery Follow up   LOS: 1 day       Subjective     Interval History:     Underwent ERCP yesterday with clearance of common duct.  Wife is at bedside today.  Some concern for aspiration noted during his ERCP yesterday.  Currently during my visit was satting around 90% on room air.    Objective     Vital Signs  Temp:  [98.7 °F (37.1 °C)-99.2 °F (37.3 °C)] 98.8 °F (37.1 °C)  Heart Rate:  [] 91  Resp:  [13-20] 16  BP: ()/(36-70) 138/70  FiO2 (%):  [58 %-65 %] 59 %    Physical Exam:   Abdomen soft  Labs:  Lab Results (last 24 hours)       Procedure Component Value Units Date/Time    Pathology Consultation [[id number]] Collected: 09/19/23 0234    Specimen: Blood, Venous Line Updated: 09/19/23 1556     Final Diagnosis --     Left shifted granulocytes  Anemia  Thrombocytopenia  No blasts identified       Case Report --     Surgical Pathology Report                         Case: [id number]                                  Authorizing Provider:  Ray Hitchcock MD        Collected:           09/19/2023 02:34 AM          Ordering Location:     [address]    Received:            09/19/2023 07:47 AM                                 MEDICAL INPATIENT                                                            Pathologist:           Sourav Gamez MD                                                            Specimen:    Blood, Venous Line                                                                         Urine Culture - Urine, Urine, Clean Catch [[id number]]  (Abnormal) Collected: 09/18/23 0557    Specimen: Urine, Clean Catch Updated: 09/19/23 0942     Urine Culture >100,000 CFU/mL Gram Negative Bacilli    Narrative:      Colonization of the urinary tract without infection is common. Treatment is discouraged unless the patient is symptomatic, pregnant, or undergoing an invasive urologic procedure.    Path Consult Reflex [[id number]] Collected: 09/19/23  0234    Specimen: Blood Updated: 09/19/23 0712     Pathology Review Yes    Manual Differential [[id number]]  (Abnormal) Collected: 09/19/23 0234    Specimen: Blood Updated: 09/19/23 0712     Neutrophil % 40.0 %      Lymphocyte % 0.0 %      Bands %  34.0 %      Metamyelocyte % 19.0 %      Myelocyte % 5.0 %      Atypical Lymphocyte % 2.0 %      Neutrophils Absolute 7.84 10*3/mm3      Lymphocytes Absolute 0.21 10*3/mm3      Anisocytosis Slight/1+     Hitchcock Cells Slight/1+     Macrocytes Slight/1+     Ovalocytes Slight/1+     Poikilocytes Slight/1+     Polychromasia Slight/1+     Dohle Bodies Present     Toxic Granulation Slight/1+     Vacuolated Neutrophils Slight/1+     Large Platelets Slight/1+    CBC & Differential [[id number]]  (Abnormal) Collected: 09/19/23 0234    Specimen: Blood Updated: 09/19/23 0712    Narrative:      The following orders were created for panel order CBC & Differential.  Procedure                               Abnormality         Status                     ---------                               -----------         ------                     CBC Auto Differential[[id number]]        Abnormal            Edited Result - FINAL      Scan Slide[[id number]]                                       Final result                 Please view results for these tests on the individual orders.    CBC Auto Differential [[id number]]  (Abnormal) Collected: 09/19/23 0234    Specimen: Blood Updated: 09/19/23 0712     WBC 10.60 10*3/mm3      RBC 3.10 10*6/mm3      Hemoglobin 10.9 g/dL      Hematocrit 32.3 %      .2 fL      MCH 35.2 pg      MCHC 33.7 g/dL      RDW 14.7 %      RDW-SD 53.8 fl      MPV 8.8 fL      Platelets 108 10*3/mm3     Narrative:      Appended report. These results have been appended to a previously verified report.  Modified report. Previous result was Hemogram + Auto diff on 9/19/2023 at 0522 EDT.  The previously reported component NRBC is no longer being reported. Previous result was 0.0 /100  WBC (Reference Range: 0.0-0.2 /100 WBC) on 9/19/2023 at 0653 EDT.    Scan Slide [[id number]] Collected: 09/19/23 0234    Specimen: Blood Updated: 09/19/23 0712     Scan Slide --     Comment: See Manual Differential Results       CANDIDA AURIS SCREEN - Swab, Axilla Right, Axilla Left and Groin [[id number]]  (Normal) Collected: 09/18/23 0535    Specimen: Swab from Axilla Right, Axilla Left and Groin Updated: 09/19/23 0600     Candida Auris Screen Culture No Candida auris isolated at 24 hours    Comprehensive Metabolic Panel [[id number]]  (Abnormal) Collected: 09/19/23 0234    Specimen: Blood Updated: 09/19/23 0333     Glucose 165 mg/dL      BUN 61 mg/dL      Creatinine 2.09 mg/dL      Sodium 145 mmol/L      Potassium 4.7 mmol/L      Comment: Result checked          Chloride 110 mmol/L      CO2 19.0 mmol/L      Calcium 7.5 mg/dL      Total Protein 4.9 g/dL      Albumin 2.6 g/dL      ALT (SGPT) 125 U/L      AST (SGOT) 177 U/L      Alkaline Phosphatase 46 U/L      Total Bilirubin 0.7 mg/dL      Globulin 2.3 gm/dL      A/G Ratio 1.1 g/dL      BUN/Creatinine Ratio 29.2     Anion Gap 16.0 mmol/L      eGFR 29.9 mL/min/1.73     Narrative:      GFR Normal >60  Chronic Kidney Disease <60  Kidney Failure <15    The GFR formula is only valid for adults with stable renal function between ages 18 and 70.    Lipase [[id number]]  (Abnormal) Collected: 09/19/23 0234    Specimen: Blood Updated: 09/19/23 0332     Lipase 106 U/L              Results Review:     Labs and imaging for today were reviewed.    Assessment & Plan     Magnus Talbot is a 88 y.o. male who has recent choledocholithiasis with biliary pancreatitis.  Now status post ERCP with common duct clearance        ERCP findings were noted showing potential occlusion of cystic duct and gallbladder.  I discussed this with the patient's wife at bedside.  She states that she discussed with his son yesterday the potential for undergoing cholecystectomy.  No clear decision has been  made regarding this.  Given the patient's current status I am unable to have any meaningful discussion with him regarding this.  I encouraged the wife again to discuss with other family members regarding this decision.  Will reassess and rediscuss with them tomorrow.          This document has been electronically signed by Tip Manrique MD on September 19, 2023 16:13 EDT        Tip Manrique MD  09/19/23  16:13 EDT

## 2023-09-19 NOTE — PROGRESS NOTES
LOS: 1 day   Patient Care Team:  Tip Parikh MD as PCP - General (Family Medicine)      Subjective Unable to assess secondary to mental status    Interval History -ERCP yesterday    ROS:   Unable to assess     Medication Review:     Current Facility-Administered Medications:     HYDROmorphone (DILAUDID) injection 0.5 mg, 0.5 mg, Intravenous, Q4H PRN, Ray Hitchcock MD, 0.5 mg at 09/18/23 1045    ondansetron (ZOFRAN) injection 4 mg, 4 mg, Intravenous, Q6H PRN, Ray Hitchcock MD    ondansetron (ZOFRAN) tablet 4 mg, 4 mg, Oral, Q6H PRN **OR** ondansetron (ZOFRAN) injection 4 mg, 4 mg, Intravenous, Q6H PRN, Ray Hitchcock MD    pantoprazole (PROTONIX) injection 40 mg, 40 mg, Intravenous, Q12H, Ray Hitchcock MD, 40 mg at 09/19/23 0909    Pharmacy to Dose Zosyn, , Does not apply, Continuous PRN, Ray Hitchcock MD    phenylephrine (DEENA-SYNEPHRINE) 50 mg in sodium chloride 0.9 % 250 mL infusion, 0.5-3 mcg/kg/min, Intravenous, Continuous PRN, Ray Hitchcock MD    piperacillin-tazobactam (ZOSYN) IVPB 3.375 g in 100 mL NS (CD), 3.375 g, Intravenous, Q8H, Ray Hitchcock MD, 3.375 g at 09/19/23 0349    Potassium Replacement - Follow Nurse / BPA Driven Protocol, , Does not apply, PRN, Ray Hitchcock MD    sodium chloride 0.9 % infusion, 150 mL/hr, Intravenous, Continuous, Ray Hitchcock MD, Last Rate: 150 mL/hr at 09/18/23 1959, 150 mL/hr at 09/18/23 1959      Objective chronically ill-appearing but no acute distress, family in room    Vital Signs  Vitals:    09/18/23 1917 09/18/23 1927 09/19/23 0206 09/19/23 0637   BP: 90/46 (!) 87/45 106/51 138/70   BP Location: Right arm Right arm  Right arm   Patient Position: Sitting Lying  Lying   Pulse: 93 93 91    Resp: 16 16     Temp:  98.8 °F (37.1 °C)     TempSrc:  Oral     SpO2: 95% 95% 94%    Weight:       Height:           Physical Exam:     General Appearance:  Sleeping but arousable to exam   Head:    Normocephalic, without obvious abnormality   Eyes:           Conjunctivae normal, anicteric sclera   Throat:   No oral lesions, no thrush, oral mucosa moist   Neck:   supple, no JVD   Lungs:   Mild dyspnea at rest   Abdomen:     Soft, nondistended, epigastric tenderness on exam   Rectal:     Deferred   Extremities:   no cyanosis   Skin:   No bruising or rash, no jaundice        Results Review:    CBC    Results from last 7 days   Lab Units 09/19/23  0234 09/18/23  0717   WBC 10*3/mm3 10.60 2.80*   HEMOGLOBIN g/dL 10.9* 12.6*   PLATELETS 10*3/mm3 108* 150     CMP   Results from last 7 days   Lab Units 09/19/23  0234 09/18/23  0717   SODIUM mmol/L 145 140   POTASSIUM mmol/L 4.7 3.7   CHLORIDE mmol/L 110* 101   CO2 mmol/L 19.0* 19.0*   BUN mg/dL 61* 47*   CREATININE mg/dL 2.09* 1.26   GLUCOSE mg/dL 165* 229*   ALBUMIN g/dL 2.6* 3.5   BILIRUBIN mg/dL 0.7 0.9   ALK PHOS U/L 46 86   AST (SGOT) U/L 177* 172*   ALT (SGPT) U/L 125* 87*   LIPASE U/L 106* 474*     Cr Clearance Estimated Creatinine Clearance: 25.2 mL/min (A) (by C-G formula based on SCr of 2.09 mg/dL (H)).  Coag   Results from last 7 days   Lab Units 09/18/23  0717   INR  1.05     HbA1C No results found for: HGBA1C  Blood Glucose No results found for: POCGLU  Infection   Results from last 7 days   Lab Units 09/18/23  0557   URINECX  >100,000 CFU/mL Gram Negative Bacilli*     UA    Results from last 7 days   Lab Units 09/18/23  0557   NITRITE UA  Positive*   WBC UA /HPF Too Numerous to Count*   BACTERIA UA /HPF 4+*   SQUAM EPITHEL UA /HPF 0-2   URINECX  >100,000 CFU/mL Gram Negative Bacilli*     Radiology(recent) XR Chest 2 View    Result Date: 9/18/2023  Impression: No unexpected retained radiopaque foreign body within the chest. Electronically Signed: Kaur Ward MD  9/18/2023 11:49 AM EDT  Workstation ID: [id number]    MRI abdomen wo contrast mrcp    Result Date: 9/18/2023  1. Small gallstones are present. There is evidence of choledocholithiasis. 2. There is abrupt tapered narrowing of the common bile duct near  the level of the ampulla, although no obstructing abnormality is seen. The findings raise the possibility of ampullary stenosis, with associated 12 mm fusiform common bile duct dilation. Consider ERCP correlation. 3. Small quantity ascites is seen within the right upper quadrant of the abdomen. Pericholecystic fluid is present, although there is no gallbladder wall thickening. Cholecystitis cannot be excluded. 4. Trace right basilar pleural effusion. Mild posterior bibasilar atelectasis, greatest on the right. 5. Small esophageal hiatal hernia. Per 6. Left renal cysts. 7. Moderate pancreatic atrophy. Electronically Signed: Kaur Ward MD  9/18/2023 12:56 PM EDT  Workstation ID: [id number]    XR Abdomen KUB    Result Date: 9/18/2023  Impression: 1. No unexpected retained radiopaque foreign body is seen within the abdomen or pelvis Electronically Signed: Kaur Ward MD  9/18/2023 11:46 AM EDT  Workstation ID: [id number]    XR Skull Lateral & Ap    Result Date: 9/18/2023  Impression: No unexpected retained opaque foreign body is seen in the skull. Electronically Signed: Kaur Ward MD  9/18/2023 11:48 AM EDT  Workstation ID: [id number]    FL ercp biliary duct only    Result Date: 9/18/2023  Impression: Successful deployment of common bile duct stent Electronically Signed: Nato Flores MD  9/18/2023 6:36 PM EDT  Workstation ID: [id number]        Assessment & Plan   Acute epigastric pain with nausea and vomiting  Elevated LFTs  Elevated lipase-improving  Macrocytic anemia  UTI  Parkinson's disease/dementia  History of CAD/stent     PLAN:  Patient is an 88-year-old male with Parkinson's dementia and CAD/stent who presents from the nursing facility with acute onset epigastric pain with nausea and vomiting.  [facility] with distended gallbladder and mild biliary dilation.  Mildly elevated AST and ALT [facility] 4400 [facility]     ERCP/EGD yesterday with  sphincterotomy/sphincteroplasty and CBD stone extraction.  Medium hiatal hernia and dark-colored liquid as well as food filled most of the stomach.  There was also suspicion of aspiration at the end of the procedure.  Continue IV antibiotics and twice daily PPI.  Monitor hemoglobin and if further low drop or overt melena, may need to reconsider EGD to fully evaluate the stomach for active bleeding.  General surgery following to consider cholecystectomy.  Continue to monitor LFTs, lipase improving.  Continue supportive care we will follow.    Electronically signed by LUI Cooper, 09/19/23, 11:13 AM EDT.

## 2023-09-19 NOTE — PLAN OF CARE
Goal Outcome Evaluation:  Plan of Care Reviewed With: spouse        Progress: no change  Outcome Evaluation: Pt is a 89yo M who was admitted to [facility] from LTC facility with c/o of stomach pain. Pt's dx with cholethiasis and pancreatitis. Pt's PMH shows PD & dementia. Pt received ECRP on 9/18. At baseline, pt's spouse states he was ambulatory with a rw and used w/c most times. Pt's spouse says pt was able to transfer to w/c indepedently at facility. Currently, pt was Mod-A for bed mobility and Mod-A for squat pivot transfer to chair. Pt was unable to stand up straight for transfer and demonstrated significant weakness during transfer. Recommend d/c back to LTC facility once medically appropiate. PT will follow up while IP.

## 2023-09-19 NOTE — THERAPY EVALUATION
Patient Name: Magnus Talbot  : 1934    MRN: [id number]                              Today's Date: 2023       Admit Date: 2023    Visit Dx:     ICD-10-CM ICD-9-CM   1. Choledocholithiasis  K80.50 574.50   2. Acute biliary pancreatitis, unspecified complication status  K85.10 577.0     Patient Active Problem List   Diagnosis    Pancreatitis    Cholelithiasis    Leukocytosis    Renal insufficiency    Parkinson's disease dementia    Hypothyroidism (acquired)    Hyperlipidemia    Hypokalemia    Essential hypertension    History of coronary artery disease    Choledocholithiasis     Past Medical History:   Diagnosis Date    CAD (coronary artery disease)     Dementia     Parkinsons     Thyroid disease      Past Surgical History:   Procedure Laterality Date    ERCP N/A 2023    Procedure: ENDOSCOPIC RETROGRADE CHOLANGIOPANCREATOGRAPHY, SPHINTEROTOMY, SPHINTEROPLASTY, CLEARANCE OF BILE DUCT WITH BALLOON (12MM-15MM, UP TO 15MM) EXTRACTION OF BILIARY STONES WITH BALLOON , OCCLUSION CHOLANGIOGRAM,  PLACEMENT OF BILIARY STENT, ESOPHAGOGASTRODUODNESCOPY;  Surgeon: Ray Hitchcock MD;  Location: [facility] ENDOSCOPY;  Service: Gastroenterology;  Laterality: N/A;  POST: CHOLEDOCHOLE    LEFT HEART CATH        General Information       Row Name 23 1605          Physical Therapy Time and Intention    Document Type evaluation  -CR (r) DB (t) CR (c)     Mode of Treatment physical therapy  -CR (r) DB (t) CR (c)       Row Name 23 1605          General Information    Patient Profile Reviewed yes  -CR (r) DB (t) CR (c)     Prior Level of Function min assist:;mod assist:;ADL's  Pt's spouse reports pt uses w/c mostly at LTC facility, uses rw at times with some assistance. Pt's spouse reports he is able to transfer to w/c independtly at facility.  -CR (r) DB (t) CR (c)     Existing Precautions/Restrictions no known precautions/restrictions  -CR (r) DB (t) CR (c)     Barriers to Rehab cognitive status  -CR (r) DB  (t) CR (c)       Row Name 09/19/23 1605          Living Environment    People in Home facility resident  -CR (r) DB (t) CR (c)       Row Name 09/19/23 1605          Cognition    Orientation Status (Cognition) unable/difficult to assess  Pt able to answer question of wanting to sit in chair, not able to answer much else.  -CR (r) DB (t) CR (c)       Row Name 09/19/23 1605          Safety Issues, Functional Mobility    Safety Issues Affecting Function (Mobility) awareness of need for assistance;impulsivity;judgment  -CR (r) DB (t) CR (c)     Impairments Affecting Function (Mobility) balance;cognition;coordination;endurance/activity tolerance;strength  -CR (r) DB (t) CR (c)               User Key  (r) = Recorded By, (t) = Taken By, (c) = Cosigned By      Initials Name Provider Type    CR Reyes, Carmela, PT Physical Therapist    Nathan Hernandez, PT Student PT Student                   Mobility       Row Name 09/19/23 1608          Bed Mobility    Bed Mobility bed mobility (all) activities  -CR (r) DB (t) CR (c)     All Activities, [place] (Bed Mobility) moderate assist (50% patient effort)  -CR (r) DB (t) CR (c)     Assistive Device (Bed Mobility) bed rails  -CR (r) DB (t) CR (c)       Row Name 09/19/23 1608          Bed-Chair Transfer    Bed-Chair [place] (Transfers) moderate assist (50% patient effort)  Pt was able to initiate transfer and take small steps, but could not fully stand erect and required Mod-A.  -CR (r) DB (t) CR (c)       Row Name 09/19/23 1608          Gait/Stairs (Locomotion)    [place] Level (Gait) not tested  -CR (r) DB (t) CR (c)               User Key  (r) = Recorded By, (t) = Taken By, (c) = Cosigned By      Initials Name Provider Type    CR Reyes, Carmela, PT Physical Therapist    Nathan Hernandez, PT Student PT Student                   Obj/Interventions       Row Name 09/19/23 1610          Range of Motion Comprehensive    Comment, General Range of Motion B UE/LE WFL  -CR (r)  DB (t) CR (c)       Row Name 09/19/23 1610          Strength Comprehensive (MMT)    Comment, General Manual Muscle Testing (MMT) Assessment B LE grossly 3+/5  -CR (r) DB (t) CR (c)       Row Name 09/19/23 1610          Balance    Balance Assessment standing dynamic balance;sitting dynamic balance  -CR (r) DB (t) CR (c)     Dynamic Sitting Balance minimal assist  -CR (r) DB (t) CR (c)     Position, Sitting Balance sitting edge of bed  -CR (r) DB (t) CR (c)     Dynamic Standing Balance moderate assist  -CR (r) DB (t) CR (c)     Position/Device Used, Standing Balance unsupported  -CR (r) DB (t) CR (c)               User Key  (r) = Recorded By, (t) = Taken By, (c) = Cosigned By      Initials Name Provider Type    CR Reyes, Carmela, PT Physical Therapist    Nathan Hernandez, PT Student PT Student                   Goals/Plan       Row Name 09/19/23 1618          Bed Mobility Goal 1 (PT)    Activity/Assistive Device (Bed Mobility Goal 1, PT) bed mobility activities, all  -CR (r) DB (t) CR (c)     [place] Level/Cues Needed (Bed Mobility Goal 1, PT) contact guard required  -CR (r) DB (t) CR (c)     Time Frame (Bed Mobility Goal 1, PT) long term goal (LTG);2 weeks  -CR (r) DB (t) CR (c)       Row Name 09/19/23 1618          Transfer Goal 1 (PT)    Activity/Assistive Device (Transfer Goal 1, PT) transfers, all;walker, rolling  -CR (r) DB (t) CR (c)     [place] Level/Cues Needed (Transfer Goal 1, PT) contact guard required  -CR (r) DB (t) CR (c)     Time Frame (Transfer Goal 1, PT) long term goal (LTG);2 weeks  -CR (r) DB (t) CR (c)       Row Name 09/19/23 1618          Gait Training Goal 1 (PT)    Activity/Assistive Device (Gait Training Goal 1, PT) gait (walking locomotion);walker, rolling  -CR (r) DB (t) CR (c)     [place] Level (Gait Training Goal 1, PT) minimum assist (75% or more patient effort)  -CR (r) DB (t) CR (c)     Distance (Gait Training Goal 1, PT) 10'  -CR (r) DB (t) CR (c)     Time Frame (Gait  Training Goal 1, PT) long term goal (LTG);2 weeks  -CR (r) DB (t) CR (c)       Row Name 09/19/23 1618          Therapy Assessment/Plan (PT)    Planned Therapy Interventions (PT) bed mobility training;gait training;patient/family education;transfer training;strengthening  -CR (r) DB (t) CR (c)               User Key  (r) = Recorded By, (t) = Taken By, (c) = Cosigned By      Initials Name Provider Type    CR Reyes, Carmela, PT Physical Therapist    Nathan Hernandez, PT Student PT Student                   Clinical Impression       Row Name 09/19/23 1611          Pain    Additional Documentation Pain Scale: FACES Pre/Post-Treatment (Group)  -CR (r) DB (t) CR (c)       Row Name 09/19/23 1611          Pain Scale: FACES Pre/Post-Treatment    Pain: FACES Scale, Pretreatment 0-->no hurt  -CR (r) DB (t) CR (c)     Posttreatment Pain Rating 0-->no hurt  -CR (r) DB (t) CR (c)       Row Name 09/19/23 1611          Plan of Care Review    Plan of Care Reviewed With spouse  -CR (r) DB (t) CR (c)     Progress no change  -CR (r) DB (t) CR (c)     Outcome Evaluation Pt is a 87yo M who was admitted to [facility] from LTC facility with c/o of stomach pain. Pt's dx with cholethiasis and pancreatitis. Pt's PMH shows PD & dementia. Pt received ECRP on 9/18. At baseline, pt's spouse states he was ambulatory with a rw and used w/c most times. Pt's spouse says pt was able to transfer to w/c indepedently at facility. Currently, pt was Mod-A for bed mobility and Mod-A for squat pivot transfer to chair. Pt was unable to stand up straight for transfer and demonstrated significant weakness during transfer. Recommend d/c back to LTC facility once medically appropiate. PT will follow up while IP.  -CR (r) DB (t) CR (c)       Row Name 09/19/23 1611          Positioning and Restraints    Pre-Treatment Position in bed  -CR (r) DB (t) CR (c)     Post Treatment Position chair  -CR (r) DB (t) CR (c)     In Chair reclined;call light within reach;encouraged to  call for assist;exit alarm on;with nsg  -CR (r) DB (t) CR (c)               User Key  (r) = Recorded By, (t) = Taken By, (c) = Cosigned By      Initials Name Provider Type    CR Reyes, Carmela, PT Physical Therapist    Nathan Hernandez, PT Student PT Student                   Outcome Measures       Row Name 09/19/23 1619          How much help from another person do you currently need...    Turning from your back to your side while in flat bed without using bedrails? 3  -CR (r) DB (t) CR (c)     Moving from lying on back to sitting on the side of a flat bed without bedrails? 2  -CR (r) DB (t) CR (c)     Moving to and from a bed to a chair (including a wheelchair)? 2  -CR (r) DB (t) CR (c)     Standing up from a chair using your arms (e.g., wheelchair, bedside chair)? 2  -CR (r) DB (t) CR (c)     Climbing 3-5 steps with a railing? 1  -CR (r) DB (t) CR (c)     To walk in hospital room? 1  -CR (r) DB (t) CR (c)     AM-PAC 6 Clicks Score (PT) 11  -CR (r) DB (t)     Highest level of mobility 4 --> Transferred to chair/commode  -CR (r) DB (t)       Row Name 09/19/23 1619          Functional Assessment    Outcome Measure Options AM-PAC 6 Clicks Basic Mobility (PT)  -CR (r) DB (t) CR (c)               User Key  (r) = Recorded By, (t) = Taken By, (c) = Cosigned By      Initials Name Provider Type    CR Reyes, Carmela, PT Physical Therapist    Nathan Hernandez, PT Student PT Student                                 Physical Therapy Education       Title: PT OT SLP Therapies (In Progress)       Topic: Physical Therapy (In Progress)       Point: Mobility training (Done)       Learning Progress Summary             Patient Acceptance, E, VU by DB at 9/19/2023 1619   Significant Other Acceptance, E, VU by DB at 9/19/2023 1619                         Point: Home exercise program (Not Started)       Learner Progress:  Not documented in this visit.              Point: Body mechanics (Not Started)       Learner Progress:  Not  documented in this visit.              Point: Precautions (Not Started)       Learner Progress:  Not documented in this visit.                              User Key       Initials Effective Dates Name Provider Type Discipline    DB 08/16/23 -  Nathan Martinez, PT Student PT Student PT                  PT Recommendation and Plan  Planned Therapy Interventions (PT): bed mobility training, gait training, patient/family education, transfer training, strengthening  Plan of Care Reviewed With: spouse  Progress: no change  Outcome Evaluation: Pt is a 87yo M who was admitted to [facility] from LTC facility with c/o of stomach pain. Pt's dx with cholethiasis and pancreatitis. Pt's PMH shows PD & dementia. Pt received ECRP on 9/18. At baseline, pt's spouse states he was ambulatory with a rw and used w/c most times. Pt's spouse says pt was able to transfer to w/c indepedently at facility. Currently, pt was Mod-A for bed mobility and Mod-A for squat pivot transfer to chair. Pt was unable to stand up straight for transfer and demonstrated significant weakness during transfer. Recommend d/c back to LTC facility once medically appropiate. PT will follow up while IP.     Time Calculation:         PT Charges       Row Name 09/19/23 1620             Time Calculation    Start Time 1540  -CR (r) DB (t) CR (c)      Stop Time 1603  -CR (r) DB (t) CR (c)      Time Calculation (min) 23 min  -CR (r) DB (t)      PT Received On 09/19/23  -CR (r) DB (t) CR (c)      PT - Next Appointment 09/21/23  -CR (r) DB (t) CR (c)      PT Goal Re-Cert Due Date 10/03/23  -CR (r) DB (t) CR (c)         Time Calculation- PT    Total Timed Code Minutes- PT 0 minute(s)  -CR (r) DB (t) CR (c)                User Key  (r) = Recorded By, (t) = Taken By, (c) = Cosigned By      Initials Name Provider Type    CR Reyes, Carmela, PT Physical Therapist    DB Nathan Martinez, PT Student PT Student                  Therapy Charges for Today       Code Description Service Date Service  Provider Modifiers Qty    [id number] HC PT EVAL MOD COMPLEXITY 4 9/19/2023 Nathan Martinez, PT Student GP 1            PT G-Codes  Outcome Measure Options: AM-PAC 6 Clicks Basic Mobility (PT)  AM-PAC 6 Clicks Score (PT): 11       Nathan Martinez, PT Student  9/19/2023

## 2023-09-19 NOTE — PROGRESS NOTES
[facility] Medicine Services   Daily Progress Note    Patient Name: Magnus Talbot  : 1934  MRN: [id number]  Primary Care Physician:  Tip Parikh MD  Date of admission: 2023  Date and Time of Service: 2023 at 1210.      Subjective      Chief Complaint: Patient came in with biliary pancreatitis.    Patient Reports patient had ERCP with a stone extraction and stent placement yesterday.  This morning he was very agitated and his oxygenation was low and he received Haldol and is quite sleepy at this point.  Patient has pretty advanced dementia anyways.  He was requiring 8 L of oxygen this morning but it went down to 5 and right now he is off oxygen.  Patient is unable to give any review of systems at this point.  I am concerned that the patient had any aspiration during the procedure and would like to rule that out.    ROS   Patient is unable to give review of systems secondary to mental status.      Objective      Vitals:   Temp:  [98.7 °F (37.1 °C)-99.2 °F (37.3 °C)] 98.8 °F (37.1 °C)  Heart Rate:  [] 91  Resp:  [13-20] 16  BP: ()/(36-70) 138/70  Flow (L/min):  [2-8] 5  FiO2 (%):  [58 %-65 %] 59 %    Physical Exam  Constitutional:       General: He is not in acute distress.     Comments: Patient is very sleepy but not agitated anymore.   HENT:      Head: Normocephalic and atraumatic.   Cardiovascular:      Rate and Rhythm: Normal rate and regular rhythm.      Pulses: Normal pulses.      Heart sounds: Normal heart sounds.   Pulmonary:      Effort: Pulmonary effort is normal.      Comments: Some scattered rhonchi especially on the right side.  Abdominal:      Palpations: Abdomen is soft.      Tenderness: There is no abdominal tenderness.   Musculoskeletal:      Right lower leg: No edema.      Left lower leg: No edema.   Skin:     General: Skin is warm and dry.   Neurological:      Comments: Patient is very sleepy but moving all extremities spontaneously.   Psychiatric:          Mood and Affect: Mood normal.             Result Review    Result Review:  I have personally reviewed the results from the time of this admission to 9/19/2023 12:09 EDT and agree with these findings:  [x]  Laboratory  []  Microbiology  []  Radiology  []  EKG/Telemetry   []  Cardiology/Vascular   []  Pathology  []  Old records  []  Other:  Most notable findings include:   CBC shows WBC of 10.6, H&H of 10.9 and 32.3 and platelets of 108.  CMP shows sodium of 145, potassium 4.7, chloride 110, carbon dioxide 19, BUN 61, creatinine six 2.09.  Blood glucose of 165.  LFTs were within normal limits except total protein of 4.9, albumin of 2.6, AST of 177 and ALT of 125.          Assessment & Plan      Brief Patient Summary:  Magnus Talbot is a 88 y.o. male who came in with biliary pancreatitis is status post ERCP with stone extraction and stent placement on 9/18/2023.      pantoprazole, 40 mg, Intravenous, Q12H  piperacillin-tazobactam, 3.375 g, Intravenous, Q8H       Pharmacy to Dose Zosyn,   phenylephrine, 0.5-3 mcg/kg/min  sodium chloride, 150 mL/hr, Last Rate: 150 mL/hr (09/19/23 1206)         Active Hospital Problems:  Active Hospital Problems    Diagnosis     **Cholelithiasis     Pancreatitis     Leukocytosis     Renal insufficiency     Parkinson's disease dementia     Hypothyroidism (acquired)     Hyperlipidemia     Hypokalemia     Essential hypertension     History of coronary artery disease     Choledocholithiasis      Plan:   #Biliary pancreatitis:  Status post ERCP with stone extraction and stent placement on 9/18/2023.  Surgery to decide if they want to go for lap danyel or not.  Pain control.  Continue antibiotics.    #Acute hypoxemic respiratory failure:  Patient was requiring high flow oxygen this morning.  We will check a chest x-ray to rule out any aspiration during the procedure.  Wean oxygen as tolerated.    #MILENA on CKD:  Patient on quite a bit of IV fluids but he still developed MILENA.  We will  closely follow and adjust fluids as needed.    #Elevated LFTs:  Likely secondary to cholelithiasis but have gone up a little bit.  Repeat CMP in the morning.    #Parkinsonism with dementia:  Continue home medications.    #Essential hypertension:  Continue home medications.    #Dyslipidemia:  Continue home medications.    Patient is full code.    DVT prophylaxis:  Mechanical DVT prophylaxis orders are present.    CODE STATUS:    Code Status (Patient has no pulse and is not breathing): CPR (Attempt to Resuscitate)  Medical Interventions (Patient has pulse or is breathing): Full Support      Disposition:  I expect patient to be discharged 1 to 2 days..      Electronically signed by Radha Herrera MD, 09/19/23, 12:09 EDT.  [facility] Hospitalist Team

## 2023-09-19 NOTE — PLAN OF CARE
Goal Outcome Evaluation:      Patient had ERCP done 9/18/23. Patient was agitated earlier today requiring Haldol to calm patient. Plan is for patient to return to SNF when medically stable.

## 2023-09-19 NOTE — PLAN OF CARE
Goal Outcome Evaluation:                  Patient sleeping between care. Patient has remained NPO.

## 2023-09-20 NOTE — PROGRESS NOTES
[facility] Medicine Services   Daily Progress Note    Patient Name: Magnus Talbot  : 1934  MRN: [id number]  Primary Care Physician:  Tip Parikh MD  Date of admission: 2023  Date and Time of Service: 2023 at 1210.      Subjective      Chief Complaint: Patient came in with biliary pancreatitis.    Patient seen and examined.  Wife by bedside.  Overnight, patient was agitated and required Haldol. Per wife, she does not think patient is able to withstand any type of surgery at this time.  Vital signs reviewed.  Patient has been weaned off oxygen.    ROS   Patient is unable to give review of systems secondary to mental status.      Objective      Vitals:   Temp:  [98 °F (36.7 °C)] 98 °F (36.7 °C)  Heart Rate:  [76-88] 76  Resp:  [16-18] 18  BP: (113-141)/(69-77) 141/77    Physical Exam  Constitutional:       General: He is not in acute distress.     Comments: Patient is very sleepy but not agitated anymore.   HENT:      Head: Normocephalic and atraumatic.   Cardiovascular:      Rate and Rhythm: Normal rate and regular rhythm.      Pulses: Normal pulses.      Heart sounds: Normal heart sounds.   Pulmonary:      Effort: Pulmonary effort is normal.      Comments: Some scattered rhonchi especially on the right side.  Abdominal:      Palpations: Abdomen is soft.      Tenderness: There is no abdominal tenderness.   Musculoskeletal:      Right lower leg: No edema.      Left lower leg: No edema.   Skin:     General: Skin is warm and dry.   Neurological:      Comments: Patient is very sleepy but moving all extremities spontaneously.   Psychiatric:         Mood and Affect: Mood normal.             Result Review    Result Review:  I have personally reviewed the results from the time of this admission to 2023 17:53 EDT and agree with these findings:  [x]  Laboratory  []  Microbiology  []  Radiology  []  EKG/Telemetry   []  Cardiology/Vascular   []  Pathology  []  Old records  []   Other:  Most notable findings include:   CBC shows WBC of 10.6, H&H of 10.9 and 32.3 and platelets of 108.  CMP shows sodium of 145, potassium 4.7, chloride 110, carbon dioxide 19, BUN 61, creatinine six 2.09.  Blood glucose of 165.  LFTs were within normal limits except total protein of 4.9, albumin of 2.6, AST of 177 and ALT of 125.          Assessment & Plan      Brief Patient Summary:  Magnus Talbot is a 88 y.o. male who came in with biliary pancreatitis is status post ERCP with stone extraction and stent placement on 9/18/2023.      cefTRIAXone, 1,000 mg, Intravenous, Q24H  metroNIDAZOLE, 500 mg, Intravenous, Q8H  pantoprazole, 40 mg, Intravenous, Q12H       phenylephrine, 0.5-3 mcg/kg/min  sodium chloride, 150 mL/hr, Last Rate: 150 mL/hr (09/20/23 0127)         Active Hospital Problems:  Active Hospital Problems    Diagnosis     **Cholelithiasis     Pancreatitis     Leukocytosis     Renal insufficiency     Parkinson's disease dementia     Hypothyroidism (acquired)     Hyperlipidemia     Hypokalemia     Essential hypertension     History of coronary artery disease     Choledocholithiasis      Plan:   #Biliary pancreatitis:  Status post ERCP with stone extraction and stent placement on 9/18/2023.  Decision for lap danyel pending at this time.  Family does not think patient can tolerate surgery at this time  Pain control.  Continue antibiotics-Rocephin and Flagyl    #Acute hypoxemic respiratory failure:  Patient has been weaned off of oxygen.  Continue to monitor    #MILENA on CKD:  Patient on quite a bit of IV fluids but he still developed MILENA.  We will closely follow and adjust fluids as needed.    #Elevated LFTs:  Likely secondary to cholelithiasis but have gone up a little bit.  Repeat CMP in the morning.    #Parkinsonism with dementia:  Continue home medications.    #Essential hypertension:  Continue home medications.    #Dyslipidemia:  Continue home medications.    Patient is full code.    DVT  prophylaxis:  Mechanical DVT prophylaxis orders are present.    CODE STATUS:    Code Status (Patient has no pulse and is not breathing): CPR (Attempt to Resuscitate)  Medical Interventions (Patient has pulse or is breathing): Full Support      Disposition:  I expect patient to be discharged 1 to 2 days..      Electronically signed by Jessica Roach MD, 09/20/23, 17:53 EDT.  [facility] Hospitalist Team

## 2023-09-20 NOTE — PROGRESS NOTES
LOS: 2 days   Patient Care Team:  Tip Parikh MD as PCP - General (Family Medicine)      Subjective unable to assess secondary to confusion    Interval History: Wife reports patient is more alert but continues to be agitated and picking at lines.  She reports increased cough overnight.  She also reports a large bowel movement with fecal incontinence but no obvious melena or rectal bleeding.  Patient denies current pain.    ROS:   Unable to assess     Medication Review:     Current Facility-Administered Medications:     HYDROmorphone (DILAUDID) injection 0.5 mg, 0.5 mg, Intravenous, Q4H PRN, Ray Hitchcock MD, 0.5 mg at 09/18/23 1045    ondansetron (ZOFRAN) injection 4 mg, 4 mg, Intravenous, Q6H PRN, Ray Hitchcock MD    ondansetron (ZOFRAN) tablet 4 mg, 4 mg, Oral, Q6H PRN **OR** ondansetron (ZOFRAN) injection 4 mg, 4 mg, Intravenous, Q6H PRN, Ray Hitchcock MD    pantoprazole (PROTONIX) injection 40 mg, 40 mg, Intravenous, Q12H, Ray Hitchcock MD, 40 mg at 09/19/23 2010    Pharmacy to Dose Zosyn, , Does not apply, Continuous PRN, Ray Hitchcock MD    phenylephrine (DEENA-SYNEPHRINE) 50 mg in sodium chloride 0.9 % 250 mL infusion, 0.5-3 mcg/kg/min, Intravenous, Continuous PRN, Ray Hitchcock MD    piperacillin-tazobactam (ZOSYN) IVPB 3.375 g in 100 mL NS (CD), 3.375 g, Intravenous, Q8H, Ray Hitchcock MD, 3.375 g at 09/20/23 0353    Potassium Replacement - Follow Nurse / BPA Driven Protocol, , Does not apply, PRN, Ray Hitchcock MD    sodium chloride 0.9 % infusion, 150 mL/hr, Intravenous, Continuous, Ray Hitchcock MD, Last Rate: 150 mL/hr at 09/20/23 0127, 150 mL/hr at 09/20/23 0127      Objective chronically ill-appearing but no acute distress, family at bedside    Vital Signs  Vitals:    09/19/23 0206 09/19/23 0637 09/19/23 1953 09/20/23 0425   BP: 106/51 138/70 135/71 113/69   BP Location:  Right arm Right arm Left arm   Patient Position:  Lying Lying Lying   Pulse: 91  88 82   Resp:   16  18   Temp:   98 °F (36.7 °C) 98 °F (36.7 °C)   TempSrc:   Oral Oral   SpO2: 94%  94%    Weight:    74 kg (163 lb 2.3 oz)   Height:           Physical Exam:     General Appearance:  More alert but confused, chronically ill-appearing but no acute distress   Head:    Normocephalic, without obvious abnormality   Eyes:          Conjunctivae normal, anicteric sclera   Throat:   No oral lesions, no thrush, oral mucosa moist   Neck:   No adenopathy, supple, no JVD   Lungs:     respirations regular, even and unlabored   Abdomen:     Soft, non-tender, nondistended   Rectal:     Deferred   Extremities:   no cyanosis   Skin:   No bruising or rash, no jaundice        Results Review:    CBC    Results from last 7 days   Lab Units 09/20/23  0031 09/19/23 0234 09/18/23  0717   WBC 10*3/mm3 10.20 10.60 2.80*   HEMOGLOBIN g/dL 10.4* 10.9* 12.6*   PLATELETS 10*3/mm3 76* 108* 150     CMP   Results from last 7 days   Lab Units 09/20/23  0031 09/19/23 0234 09/18/23  0717   SODIUM mmol/L 143 145 140   POTASSIUM mmol/L 4.4 4.7 3.7   CHLORIDE mmol/L 111* 110* 101   CO2 mmol/L 19.0* 19.0* 19.0*   BUN mg/dL 75* 61* 47*   CREATININE mg/dL 2.66* 2.09* 1.26   GLUCOSE mg/dL 127* 165* 229*   ALBUMIN g/dL 2.3* 2.6* 3.5   BILIRUBIN mg/dL 0.6 0.7 0.9   ALK PHOS U/L 55 46 86   AST (SGOT) U/L 95* 177* 172*   ALT (SGPT) U/L 102* 125* 87*   LIPASE U/L 25 106* 474*     Cr Clearance Estimated Creatinine Clearance: 20.1 mL/min (A) (by C-G formula based on SCr of 2.66 mg/dL (H)).  Coag   Results from last 7 days   Lab Units 09/18/23  0717   INR  1.05     HbA1C No results found for: HGBA1C  Blood Glucose No results found for: POCGLU  Infection   Results from last 7 days   Lab Units 09/18/23  0557   URINECX  >100,000 CFU/mL Escherichia coli*     UA    Results from last 7 days   Lab Units 09/18/23  0557   NITRITE UA  Positive*   WBC UA /HPF Too Numerous to Count*   BACTERIA UA /HPF 4+*   SQUAM EPITHEL UA /HPF 0-2   URINECX  >100,000 CFU/mL Escherichia coli*      Radiology(recent) XR Chest 2 View    Result Date: 9/19/2023  Impression: 1. Low volume inspiration. No acute cardiopulmonary disease. Electronically Signed: Nato Flores MD  9/19/2023 3:35 PM EDT  Workstation ID: [id number]    XR Chest 2 View    Result Date: 9/18/2023  Impression: No unexpected retained radiopaque foreign body within the chest. Electronically Signed: Kaur Ward MD  9/18/2023 11:49 AM EDT  Workstation ID: [id number]    MRI abdomen wo contrast mrcp    Result Date: 9/18/2023  1. Small gallstones are present. There is evidence of choledocholithiasis. 2. There is abrupt tapered narrowing of the common bile duct near the level of the ampulla, although no obstructing abnormality is seen. The findings raise the possibility of ampullary stenosis, with associated 12 mm fusiform common bile duct dilation. Consider ERCP correlation. 3. Small quantity ascites is seen within the right upper quadrant of the abdomen. Pericholecystic fluid is present, although there is no gallbladder wall thickening. Cholecystitis cannot be excluded. 4. Trace right basilar pleural effusion. Mild posterior bibasilar atelectasis, greatest on the right. 5. Small esophageal hiatal hernia. Per 6. Left renal cysts. 7. Moderate pancreatic atrophy. Electronically Signed: Kaur Ward MD  9/18/2023 12:56 PM EDT  Workstation ID: [id number]    XR Abdomen KUB    Result Date: 9/18/2023  Impression: 1. No unexpected retained radiopaque foreign body is seen within the abdomen or pelvis Electronically Signed: Kaur Ward MD  9/18/2023 11:46 AM EDT  Workstation ID: [id number]    XR Skull Lateral & Ap    Result Date: 9/18/2023  Impression: No unexpected retained opaque foreign body is seen in the skull. Electronically Signed: Kaur Ward MD  9/18/2023 11:48 AM EDT  Workstation ID: [id number]    FL ercp biliary duct only    Result Date: 9/18/2023  Impression: Successful deployment of common bile duct stent Electronically Signed: Nato Flores  MD  9/18/2023 6:36 PM EDT  Workstation ID: [id number]        Assessment & Plan   Acute epigastric pain with nausea and vomiting-resolved  Choledocholithiasis s/p ERCP with bile duct clearance  Elevated LFTs-improving  Elevated lipase-resolved  Macrocytic anemia  UTI  Parkinson's disease/dementia  History of CAD/stent     PLAN:  Patient is an 88-year-old male with Parkinson's dementia and CAD/stent who presents from the nursing facility with acute onset epigastric pain with nausea and vomiting.  [facility] with distended gallbladder and mild biliary dilation.  Mildly elevated AST and ALT with lipase 4400 at [facility] hospital.      ERCP/EGD 9/18/2023 with sphincterotomy/sphincteroplasty and CBD stone extraction.  Medium hiatal hernia and dark-colored liquid as well as food filled most of the stomach.  There was also suspicion of aspiration at the end of the procedure.   Chest x-ray noted.  Significant worsening of CRP.  Check stool PCR and C. difficile.  Currently on IV Zosyn.  Continue twice daily PPI.  Continue supportive care and we will follow closely.  General surgery following for cholecystectomy although high risk for surgical intervention secondary to age and comorbidities.    Electronically signed by LUI Cooper, 09/20/23, 10:35 AM EDT.

## 2023-09-20 NOTE — PLAN OF CARE
Goal Outcome Evaluation:   Patient has no complaints of pain. Patient given diet as patient is more alert today and family remaining at bedside. Patient and family deciding on next steps of care.

## 2023-09-20 NOTE — PROGRESS NOTES
GENERAL SURGERY PROGRESS NOTE  Chief Complaint:  Surgery Follow up   LOS: 2 days       Subjective     Interval History:     Having large BMs. No blood. Wife currently out of room. Appears more alert.    Objective     Vital Signs  Temp:  [98 °F (36.7 °C)] 98 °F (36.7 °C)  Heart Rate:  [82-88] 82  Resp:  [16-18] 18  BP: (113-135)/(69-71) 113/69    Physical Exam:   Abdomen soft  Labs:  Lab Results (last 24 hours)       Procedure Component Value Units Date/Time    Gastrointestinal Panel, PCR - Stool, Per Rectum [[id number]] Collected: 09/20/23 1039    Specimen: Stool from Per Rectum Updated: 09/20/23 1121    Clostridioides difficile Toxin - Stool, Per Rectum [[id number]] Collected: 09/20/23 1039    Specimen: Stool from Per Rectum Updated: 09/20/23 1121    Narrative:      The following orders were created for panel order Clostridioides difficile Toxin - Stool, Per Rectum.  Procedure                               Abnormality         Status                     ---------                               -----------         ------                     Clostridioides difficile...[[id number]]                      In process                   Please view results for these tests on the individual orders.    Clostridioides difficile EIA - Stool, Per Rectum [[id number]] Collected: 09/20/23 1039    Specimen: Stool from Per Rectum Updated: 09/20/23 1121    CANDIDA AURIS SCREEN - Swab, Axilla Right, Axilla Left and Groin [[id number]]  (Normal) Collected: 09/18/23 0535    Specimen: Swab from Axilla Right, Axilla Left and Groin Updated: 09/20/23 0600     Candida Auris Screen Culture No Candida auris isolated at 2 days    Urine Culture - Urine, Urine, Clean Catch [[id number]]  (Abnormal)  (Susceptibility) Collected: 09/18/23 0557    Specimen: Urine, Clean Catch Updated: 09/20/23 0340     Urine Culture >100,000 CFU/mL Escherichia coli    Narrative:      Colonization of the urinary tract without infection is common. Treatment is discouraged unless  the patient is symptomatic, pregnant, or undergoing an invasive urologic procedure.    Susceptibility        Escherichia coli      DEONNA      Ampicillin Susceptible      Ampicillin + Sulbactam Susceptible      Cefazolin Susceptible      Cefepime Susceptible      Ceftazidime Susceptible      Ceftriaxone Susceptible      Gentamicin Susceptible      Levofloxacin Susceptible      Nitrofurantoin Susceptible      Piperacillin + Tazobactam Susceptible      Trimethoprim + Sulfamethoxazole Susceptible                           Manual Differential [[id number]]  (Abnormal) Collected: 09/20/23 0031    Specimen: Blood Updated: 09/20/23 0221     Neutrophil % 60.0 %      Lymphocyte % 7.0 %      Monocyte % 6.0 %      Bands %  25.0 %      Metamyelocyte % 1.0 %      Myelocyte % 1.0 %      Neutrophils Absolute 8.67 10*3/mm3      Lymphocytes Absolute 0.71 10*3/mm3      Monocytes Absolute 0.61 10*3/mm3      Anisocytosis Slight/1+     Poikilocytes Slight/1+     WBC Morphology Normal     Platelet Morphology Normal    CBC & Differential [[id number]]  (Abnormal) Collected: 09/20/23 0031    Specimen: Blood Updated: 09/20/23 0221    Narrative:      The following orders were created for panel order CBC & Differential.  Procedure                               Abnormality         Status                     ---------                               -----------         ------                     CBC Auto Differential[[id number]]        Abnormal            Final result               Scan Slide[[id number]]                                       Final result                 Please view results for these tests on the individual orders.    CBC Auto Differential [[id number]]  (Abnormal) Collected: 09/20/23 0031    Specimen: Blood Updated: 09/20/23 0221     WBC 10.20 10*3/mm3      RBC 3.02 10*6/mm3      Hemoglobin 10.4 g/dL      Hematocrit 31.4 %      .1 fL      MCH 34.3 pg      MCHC 33.0 g/dL      RDW 14.9 %      RDW-SD 54.3 fl      MPV 9.4 fL       Platelets 76 10*3/mm3     Narrative:      The previously reported component NRBC is no longer being reported. Previous result was 0.0 /100 WBC (Reference Range: 0.0-0.2 /100 WBC) on 9/20/2023 at 0056 EDT.    Scan Slide [[id number]] Collected: 09/20/23 0031    Specimen: Blood Updated: 09/20/23 0221     Scan Slide --     Comment: See Manual Differential Results       Comprehensive Metabolic Panel [[id number]]  (Abnormal) Collected: 09/20/23 0031    Specimen: Blood Updated: 09/20/23 0117     Glucose 127 mg/dL      BUN 75 mg/dL      Creatinine 2.66 mg/dL      Sodium 143 mmol/L      Potassium 4.4 mmol/L      Chloride 111 mmol/L      CO2 19.0 mmol/L      Calcium 7.4 mg/dL      Total Protein 5.4 g/dL      Albumin 2.3 g/dL      ALT (SGPT) 102 U/L      AST (SGOT) 95 U/L      Alkaline Phosphatase 55 U/L      Total Bilirubin 0.6 mg/dL      Globulin 3.1 gm/dL      A/G Ratio 0.7 g/dL      BUN/Creatinine Ratio 28.2     Anion Gap 13.0 mmol/L      eGFR 22.4 mL/min/1.73     Narrative:      GFR Normal >60  Chronic Kidney Disease <60  Kidney Failure <15    The GFR formula is only valid for adults with stable renal function between ages 18 and 70.    Lipase [[id number]]  (Normal) Collected: 09/20/23 0031    Specimen: Blood Updated: 09/20/23 0116     Lipase 25 U/L     C-reactive Protein [[id number]]  (Abnormal) Collected: 09/20/23 0031    Specimen: Blood Updated: 09/20/23 0116     C-Reactive Protein 34.33 mg/dL     Pathology Consultation [[id number]] Collected: 09/19/23 0234    Specimen: Blood, Venous Line Updated: 09/19/23 1556     Final Diagnosis --     Left shifted granulocytes  Anemia  Thrombocytopenia  No blasts identified       Case Report --     Surgical Pathology Report                         Case: [id number]                                  Authorizing Provider:  Ray Hitchcock MD        Collected:           09/19/2023 02:34 AM          Ordering Location:     [address]    Received:            09/19/2023 07:47 AM                                  MEDICAL INPATIENT                                                            Pathologist:           Sourav Gamez MD                                                            Specimen:    Blood, Venous Line                                                                                  Results Review:     Labs and imaging for today were reviewed.    Assessment & Plan     Magnus Talbot is a 88 y.o. male who has recent choledocholithiasis, pancreatitis, now s/p ERCP.      Currently I feel that he is too high risk to undergo a semi-elective cholecystectomy. His wife seems to be in agreement with this as well. Would recommend ABx for his UTI, ideally to empirically cover his GB as well.  No planned surgical intervention from me at this time.          This document has been electronically signed by Tip Manrique MD on September 20, 2023 11:47 EDT        Tip Manrique MD  09/20/23  11:47 EDT

## 2023-09-20 NOTE — PLAN OF CARE
Goal Outcome Evaluation:                  Patient remained confused and restless throughout shift. One time dose of haldol given, patient continued to be restless.

## 2023-09-20 NOTE — CASE MANAGEMENT/SOCIAL WORK
Continued Stay Note   Esteban     Patient Name: Magnus Talbot  MRN: [id number]  Today's Date: 9/20/2023    Admit Date: 9/18/2023    Plan: Return to [facility]. No precert required. No PASRR needed (return to SNF).   Discharge Plan       Row Name 09/20/23 1314       Plan    Plan Comments ERCP done 9/18 no plans for cholecystectomy at this time. IV Zosyn changed to Rocephin and pt on IV Flagyl. Cr increased to 2.66.             Expected Discharge Date and Time       Expected Discharge Date Expected Discharge Time    Sep 22, 2023         Silvia Mead RN    phone [phone]  fax [phone]

## 2023-09-21 PROBLEM — E44.0 MODERATE MALNUTRITION: Status: ACTIVE | Noted: 2023-01-01

## 2023-09-21 NOTE — NURSING NOTE
Patient transferred to [facility] to receive Cardizem drip for elevated HR and new A-fib on EKG. Cardiology consulted and echo ordered.

## 2023-09-21 NOTE — THERAPY EVALUATION
Acute Care - Speech Language Pathology   Swallow Initial Evaluation [facility]     Patient Name: Magnus Talbot  : 1934  MRN: [id number]  Today's Date: 2023               Admit Date: 2023    Visit Dx:     ICD-10-CM ICD-9-CM   1. Choledocholithiasis  K80.50 574.50   2. Acute biliary pancreatitis, unspecified complication status  K85.10 577.0     Patient Active Problem List   Diagnosis    Pancreatitis    Cholelithiasis    Leukocytosis    Renal insufficiency    Parkinson's disease dementia    Hypothyroidism (acquired)    Hyperlipidemia    Hypokalemia    Essential hypertension    History of coronary artery disease    Choledocholithiasis     Past Medical History:   Diagnosis Date    CAD (coronary artery disease)     Dementia     Parkinsons     Thyroid disease      Past Surgical History:   Procedure Laterality Date    ERCP N/A 2023    Procedure: ENDOSCOPIC RETROGRADE CHOLANGIOPANCREATOGRAPHY, SPHINTEROTOMY, SPHINTEROPLASTY, CLEARANCE OF BILE DUCT WITH BALLOON (12MM-15MM, UP TO 15MM) EXTRACTION OF BILIARY STONES WITH BALLOON , OCCLUSION CHOLANGIOGRAM,  PLACEMENT OF BILIARY STENT, ESOPHAGOGASTRODUODNESCOPY;  Surgeon: Ray Hitchcock MD;  Location: [facility] ENDOSCOPY;  Service: Gastroenterology;  Laterality: N/A;  POST: CHOLEDOCHOLE    LEFT HEART CATH         SLP Recommendation and Plan  SLP Swallowing Diagnosis: suspected pharyngeal dysphagia (23 1230)  SLP Diet Recommendation: NPO, temporary alternate methods of nutrition/hydration (Rashid Water Protocol) (23 1230)     SLP Rec. for Method of Medication Administration: meds via alternate route (23 1230)     Monitor for Signs of Aspiration: yes (23 1230)  Recommended Diagnostics: reassess via clinical swallow evaluation (23 1230)  Swallow Criteria for Skilled Therapeutic Interventions Met: demonstrates skilled criteria (23 1230)     Rehab Potential/Prognosis, Swallowing: good, to achieve stated therapy goals (23  "1230)  Therapy Frequency (Swallow): PRN (09/21/23 1230)  Predicted Duration Therapy Intervention (Days): until discharge (09/21/23 1230)  Oral Care Recommendations: Before ice/water, Oral Care BID/PRN (09/21/23 1230)                                      Oral Care Recommendations: Before ice/water, Oral Care BID/PRN (09/21/23 1230)           SWALLOW EVALUATION (last 72 hours)       SLP Adult Swallow Evaluation       Row Name 09/21/23 1230       Rehab Evaluation    Document Type evaluation  -EC    Subjective Information no complaints  -EC    Patient Observations lethargic  AMS, far from baseline per spouse  -EC    Patient Effort adequate  -EC       General Information    Patient Profile Reviewed yes  -EC    Pertinent History Of Current Problem Patient is an 88-year-old male with Parkinson's dementia and CAD/stent who presents from the nursing facility with acute onset epigastric pain with nausea and vomiting. CT at outlying facility shows cholelithiasis with distended gallbladder and mild biliary dilation. ERCP/EGD 9/18/2023 with sphincterotomy/sphincteroplasty and CBD stone extraction. Medium hiatal hernia and dark-colored liquid as well as food filled most of the stomach. There was also suspicion of aspiration at the end of the procedure. hest x-ray suggest pulmonary edema or pneumonia, currently on ceftriaxone and metronidazole.   ST consulted following choking episode w/meds last night. Pt currently NPO.  -EC    Current Method of Nutrition NPO  -EC    Precautions/Limitations, Vision WFL;for purposes of eval  -EC    Precautions/Limitations, Hearing WFL;for purposes of eval  -EC    Prior Level of Function-Communication --  Spouse states pt is conversive/communicative at baseline though does \"forget things.\"  -EC    Prior Level of Function-Swallowing chopped;mechanical ground textures;thin liquids  -EC    Plans/Goals Discussed with patient;spouse/S.O.;agreed upon  -EC       Pain    Additional Documentation --  no " "pain reported  -EC       Oral Motor Structure and Function    Dentition Assessment upper dentures/partial in place;natural, present and adequate  -EC    Secretion Management WNL/WFL  bright red blood noted on posterior roof of mouth  -EC       Oral Musculature and Cranial Nerve Assessment    Oral Motor General Assessment --  does not follow simple body commands  -EC       General Eating/Swallowing Observations    Respiratory Support Currently in Use nasal cannula  -EC    Eating/Swallowing Skills fed by SLP  -EC    Positioning During Eating upright 90 degree;upright in bed  -EC    Utensils Used spoon;straw  -EC    Consistencies Trialed thin liquids;ice chips  -EC       Respiratory    Respiratory Status --  wet/crackling respirations during evaluation  -EC       Clinical Swallow Eval    Clinical Swallow Evaluation Summary Swallow evaluation completed this date. Pt awake though does not respond verbally to simple questions and does not follow simple body commands. Pt opens mouth minimally w/bright red blood noted roof of mouth, MD enters shortly after and is made aware. Wife at bedside states pt is normally alert and conversive though does \"forget things.\" She states pt is far from baseline cognitively. Wife reports a soft to chew/mech ground baseline diet. Pt administered ice chip w/min manipulation and appears to swallow whole. Pt administered water via spoon w/wet, crackling respiratory quality noted. Pt administered water via straw w/bolus holding and 4-5 effortful swallows noted per sip. Pt w/coughing following water.     Pt appears at high risk for aspiration due to AMS and overt s/s of aspiration at bedside.     Recommend continue NPO with Rashid Water Protocol & nutrition and meds via alternate route. ST to follow up next date for re-eval w/goal of returning to a PO diet.      The rationale to recommend water when a PO diet cannot appropriately/functionally sustain nutrition is because water is low risk for " aspiration pna when compared to aspiration of food or other liquids.    Benefits of a water protocol include but are not limited to:     Oral gratification   Engagement of oropharyngeal swallow musculature   Decrease likelihood of dehydration      Guidelines for proper implementation include:  Thorough oral care prior to consuming water  Upright at 90 degree hip flexion  Small sips at slow rate  Monitor for any changes in respiratory status and discontinue if distress noted    The Rashid Free Water Protocol is a research based protocol established in 1984.   -EC       SLP Evaluation Clinical Impression    SLP Swallowing Diagnosis suspected pharyngeal dysphagia  -EC    Functional Impact risk of aspiration/pneumonia;risk of malnutrition;risk of dehydration  -EC    Rehab Potential/Prognosis, Swallowing good, to achieve stated therapy goals  -EC    Swallow Criteria for Skilled Therapeutic Interventions Met demonstrates skilled criteria  -EC       SLP Treatment Clinical Impressions    Care Plan Review evaluation/treatment results reviewed;patient/other agree to care plan  -EC    Care Plan Review, Other Participant(s) spouse  -EC       Recommendations    Therapy Frequency (Swallow) PRN  -EC    Predicted Duration Therapy Intervention (Days) until discharge  -EC    SLP Diet Recommendation NPO;temporary alternate methods of nutrition/hydration  Rashid Water Protocol  -EC    Recommended Diagnostics reassess via clinical swallow evaluation  -EC    Oral Care Recommendations Before ice/water;Oral Care BID/PRN  -EC    SLP Rec. for Method of Medication Administration meds via alternate route  -EC    Monitor for Signs of Aspiration yes  -EC       Swallow Goals (SLP)    Swallow LTGs Swallow Long Term Goal (free text)  -EC    Swallow STGs diet tolerance goal selection (SLP)  -EC    Diet Tolerance Goal Selection (SLP) Swallow Short Term Goal 1  -EC       (LTG) Swallow    (LTG) Swallow Pt will return to oral nutrition without acute  complications from aspiration  -EC    [place] (Swallow Long Term Goal) with minimal cues (75-90% accuracy)  -EC    Time Frame (Swallow Long Term Goal) by discharge  -EC    Progress/Outcomes (Swallow Long Term Goal) new goal  -EC       (STG) Swallow 1    (STG) Swallow 1 The patient will participate in ongoing assessment of swallow, including re-evaluation clinically and/or including instrumental assessment of swallow if indicated, to further assess swallow function in anticipation to initiate a po diet  -EC    [place] (Swallow Short Term Goal 1) with minimal cues (75-90% accuracy)  -EC    Progress/Outcomes (Swallow Short Term Goal 1) new goal  -EC              User Key  (r) = Recorded By, (t) = Taken By, (c) = Cosigned By      Initials Name Effective Dates    EC Kenzie Blake 06/16/21 -                     EDUCATION  The patient has been educated in the following areas:   Dysphagia (Swallowing Impairment) Oral Care/Hydration NPO rationale.        SLP GOALS       Row Name 09/21/23 1230             (LTG) Swallow    (LTG) Swallow Pt will return to oral nutrition without acute complications from aspiration  -EC      [place] (Swallow Long Term Goal) with minimal cues (75-90% accuracy)  -EC      Time Frame (Swallow Long Term Goal) by discharge  -EC      Progress/Outcomes (Swallow Long Term Goal) new goal  -EC         (STG) Swallow 1    (STG) Swallow 1 The patient will participate in ongoing assessment of swallow, including re-evaluation clinically and/or including instrumental assessment of swallow if indicated, to further assess swallow function in anticipation to initiate a po diet  -EC      [place] (Swallow Short Term Goal 1) with minimal cues (75-90% accuracy)  -EC      Progress/Outcomes (Swallow Short Term Goal 1) new goal  -EC                User Key  (r) = Recorded By, (t) = Taken By, (c) = Cosigned By      Initials Name Provider Type    EC Kenzie Blake Speech and Language Pathologist                        Time Calculation:                Kenzie Blake  9/21/2023

## 2023-09-21 NOTE — PLAN OF CARE
"Goal Outcome Evaluation:            ST consulted following choking episode w/meds last night. Pt currently NPO.      Swallow evaluation completed this date. Pt awake though does not respond verbally to simple questions and does not follow simple body commands. Pt opens mouth minimally w/bright red blood noted roof of mouth, MD enters shortly after and is made aware. Wife at bedside states pt is normally alert and conversive though does \"forget things.\" She states pt is far from baseline cognitively. Wife reports a soft to chew/mech ground baseline diet. Pt administered ice chip w/min manipulation and appears to swallow whole. Pt administered water via spoon w/wet, crackling respiratory quality noted. Pt administered water via straw w/bolus holding and 4-5 effortful swallows noted per sip. Pt w/coughing following water.      Pt appears at high risk for aspiration due to AMS and overt s/s of aspiration at bedside.      Recommend continue NPO with Rashid Water Protocol & nutrition and meds via alternate route. ST to follow up next date for re-eval w/goal of returning to a PO diet.       The rationale to recommend water when a PO diet cannot appropriately/functionally sustain nutrition is because water is low risk for aspiration pna when compared to aspiration of food or other liquids.    Benefits of a water protocol include but are not limited to:      Oral gratification   Engagement of oropharyngeal swallow musculature   Decrease likelihood of dehydration       Guidelines for proper implementation include:  Thorough oral care prior to consuming water  Upright at 90 degree hip flexion  Small sips at slow rate  Monitor for any changes in respiratory status and discontinue if distress noted     The Rashid Free Water Protocol is a research based protocol established in 1984.   -EC       "

## 2023-09-21 NOTE — PROGRESS NOTES
[facility] Services   Daily Progress Note    Patient Name: Magnus Talbot  : 1934  MRN: [id number]  Primary Care Physician:  Tip Parikh MD  Date of admission: 2023  Date and Time of Service: 2023 at 1210.      Subjective      Chief Complaint: Patient came in with biliary pancreatitis.    Patient seen and examined.  Wife by bedside.  Patient has not been doing well with swallowing.  Speech consulted and recommended NPO.  Patient's wife does not want an NG tube as she thinks that patient will pull it off as it would be uncomfortable.  Patient also sounded more coarse today.  Repeat chest x-ray shows pulmonary edema. We will give a one-time dose of Lasix.  Patient also developed A-fib later in the day which is new.  We will start patient on Cardizem drip.      ROS   Patient is unable to give review of systems secondary to mental status.      Objective      Vitals:   Temp:  [97.7 °F (36.5 °C)-98.1 °F (36.7 °C)] 97.7 °F (36.5 °C)  Heart Rate:  [76-86] 82  Resp:  [17-19] 17  BP: (134-144)/(75-94) 134/94      General Appearance: AOO x 2, patient lethargic but awakens to stimuli  Head:  Normocephalic, without obvious abnormality  Eyes:  PERRL, EOM's intact, conjunctivae and cornea clear  Nose:  Nares symmetrical, septum midline, mucosa pink  Throat:  Lips, tongue, and mucosa are moist, pink, and intact  Neck:  Supple; symmetrical, trachea midline, no adenopathy  Back:  Symmetrical, ROM normal, no CVA tenderness  Lungs: Coarse breath sounds bilaterally  Heart: Irregular rate & rhythm, S1 and S2 normal  Abdomen:  Soft, nontender, bowel sounds active all four quadrants  Musculoskeletal: Tone and strength strong and symmetrical, all extremities; no joint pain or edema         Skin/Hair/Nails: Multiple skin bruises     Result Review    Result Review:  I have personally reviewed the results from the time of this admission to 2023 08:11 EDT and agree with these findings:  [x]   Laboratory  []  Microbiology  [x]  Radiology  []  EKG/Telemetry   []  Cardiology/Vascular   []  Pathology  []  Old records  []  Other:  Most notable findings include: Chest x-ray shows patchy bilateral airspace opacities, which could reflect pulmonary edema or pneumonia.         Assessment & Plan      Brief Patient Summary:  Magnus Talbot is a 88 y.o. male who came in with biliary pancreatitis is status post ERCP with stone extraction and stent placement on 9/18/2023.      cefTRIAXone, 1,000 mg, Intravenous, Q24H  guaiFENesin-codeine, 10 mL, Oral, Q6H  metroNIDAZOLE, 500 mg, Intravenous, Q8H  pantoprazole, 40 mg, Intravenous, Q12H       phenylephrine, 0.5-3 mcg/kg/min  sodium chloride, 150 mL/hr, Last Rate: 150 mL/hr (09/21/23 0528)         Active Hospital Problems:  Active Hospital Problems    Diagnosis     **Cholelithiasis     Pancreatitis     Leukocytosis     Renal insufficiency     Parkinson's disease dementia     Hypothyroidism (acquired)     Hyperlipidemia     Hypokalemia     Essential hypertension     History of coronary artery disease     Choledocholithiasis      Plan:   #Biliary pancreatitis:  Status post ERCP with stone extraction and stent placement on 9/18/2023.  No surgical intervention at this time for gallbladder removal.    Pain control.  Continue antibiotics-Rocephin and Flagyl    #Pulmonary edema  #Acute hypoxemic respiratory failure:  Chest x-ray shows pulmonary edema  Patient on 2 L nasal cannula  Give a one-time dose of Lasix IV   Monitor need for Lasix due to MILENA  We will stop IV fluids  Add DuoNeb every 6 hours    #MILENA on CKD:  Patient on quite a bit of IV fluids but he still developed MILENA.  Creatinine continues to trend up  Discontinue IV fluids due to pulmonary edema  Consult nephrology for assistance    New onset atrial fibrillation with RVR  Start Cardizem drip  Obtain echocardiogram  Consult cardiology    #Elevated LFTs:  Likely secondary to cholelithiasis but have gone up a little  bit.  Repeat CMP in the morning.    #Parkinsonism with dementia:  Continue home medications.    #Essential hypertension:  Continue home medications.    #Dyslipidemia:  Continue home medications.    Patient is full code.    DVT prophylaxis:  Mechanical DVT prophylaxis orders are present.    CODE STATUS:    Code Status (Patient has no pulse and is not breathing): CPR (Attempt to Resuscitate)  Medical Interventions (Patient has pulse or is breathing): Full Support      Disposition:  I expect patient to be discharged 1 to 2 days..      Electronically signed by Jessica Roach MD, 09/21/23, 08:11 EDT.  [facility] Hospitalist Team

## 2023-09-21 NOTE — CONSULTS
Nutrition Services    Patient Name: Magnus Talbot  YOB: 1934  MRN: [id number]  Admission date: 9/18/2023    Comment:    Please consult RD if nutrition support desired. TF worked up in this note.    Moderate chronic disease related malnutrition related to Parkinson's disease as evidenced by PO intake meeting less than 75% of estimated energy requirement for greater than or equal to 1 month and moderate + severe (mainly moderate) muscle wasting per NFPE.     See MSA below.    PPE Documentation        PPE Worn By Provider gloves   PPE Worn By Patient  N/A     CLINICAL NUTRITION ASSESSMENT      Reason for Assessment 9/21: mainly NPO x 3 days     H&P      Past Medical History:   Diagnosis Date    CAD (coronary artery disease)     Dementia     Parkinsons     Thyroid disease        Past Surgical History:   Procedure Laterality Date    ERCP N/A 9/18/2023    Procedure: ENDOSCOPIC RETROGRADE CHOLANGIOPANCREATOGRAPHY, SPHINTEROTOMY, SPHINTEROPLASTY, CLEARANCE OF BILE DUCT WITH BALLOON (12MM-15MM, UP TO 15MM) EXTRACTION OF BILIARY STONES WITH BALLOON , OCCLUSION CHOLANGIOGRAM,  PLACEMENT OF BILIARY STENT, ESOPHAGOGASTRODUODNESCOPY;  Surgeon: Ray Hitchcock MD;  Location: [facility] ENDOSCOPY;  Service: Gastroenterology;  Laterality: N/A;  POST: CHOLEDOCHOLE    LEFT HEART CATH          Current Problems   Choledocholithiasis  Acute biliary pancreatitis  -GI following  -s/p ERCP  -no plan for surgical intervention    Parkinson's dementia  Dysphagia - NPO per SLP on 9/21       Encounter Information        Trending Narrative     9/21: Pt admitted to [facility] with cholelithiasis. No plan for surgical intervention at this time. GI recommended diet initiation pending swallow eval but SLP recommending NPO with alternative means of nutrition following eval today. RD visited pt at bedside. Pt's wife reported pt always ate 100% of meals at nursing facility, even when food wasn't good. NFPE completed, consistent with nutrition  "diagnosis of malnutrition using AND/ASPEN criteria. See MSA below.        Anthropometrics        Current Height, Weight Height: 167.6 cm (66\")  Weight: 74 kg (163 lb 2.3 oz) (09/20/23 0425)       Usual Body Weight (UBW) Unknown        Trending Weight Hx     This admission: 9/21: 160#             PTA: No wt to review    Wt Readings from Last 30 Encounters:   09/20/23 0425 74 kg (163 lb 2.3 oz)   09/18/23 0332 72.8 kg (160 lb 7.9 oz)      BMI kg/m2 Body mass index is 26.33 kg/m².       Labs        Pertinent Labs Hypernatremia   Hyperglycemia  Elevated BUN/creatinine    Results from last 7 days   Lab Units 09/21/23  0055 09/20/23  0031 09/19/23  0234   SODIUM mmol/L 149* 143 145   POTASSIUM mmol/L 3.9 4.4 4.7   CHLORIDE mmol/L 119* 111* 110*   CO2 mmol/L 18.0* 19.0* 19.0*   BUN mg/dL 85* 75* 61*   CREATININE mg/dL 2.75* 2.66* 2.09*   CALCIUM mg/dL 7.7* 7.4* 7.5*   BILIRUBIN mg/dL 0.4 0.6 0.7   ALK PHOS U/L 65 55 46   ALT (SGPT) U/L 77* 102* 125*   AST (SGOT) U/L 48* 95* 177*   GLUCOSE mg/dL 159* 127* 165*     Results from last 7 days   Lab Units 09/21/23  0954   HEMOGLOBIN g/dL 10.0*   HEMATOCRIT % 30.8*     No results found for: HGBA1C     Medications    Scheduled Medications albumin human, 25 g, Intravenous, Q8H  calcium gluconate, 1,000 mg, Intravenous, Q12H  carbidopa-levodopa, 2 tablet, Oral, TID  cefTRIAXone, 1,000 mg, Intravenous, Q24H  donepezil, 10 mg, Oral, Daily  guaiFENesin-codeine, 10 mL, Oral, Q6H  insulin lispro, 2-7 Units, Subcutaneous, Q6H  ipratropium-albuterol, 3 mL, Nebulization, 4x Daily - RT  memantine, 10 mg, Oral, Nightly  metroNIDAZOLE, 500 mg, Intravenous, Q8H  pantoprazole, 40 mg, Intravenous, Q12H  sodium bicarbonate, 50 mEq, Intravenous, Q8H        Infusions dextrose, 100 mL/hr  phenylephrine, 0.5-3 mcg/kg/min        PRN Medications   dextrose    dextrose    glucagon (human recombinant)    HYDROmorphone    ondansetron    ondansetron **OR** ondansetron    phenylephrine    Potassium " Replacement - Follow Nurse / BPA Driven Protocol     Physical Findings        Trending Physical   Appearance, NFPE 9/21: NFPE completed, consistent with nutrition diagnosis of malnutrition using AND/ASPEN criteria. See MSA below.      --  Edema  None documented      Bowel Function + BM on 9/21     Tubes None at this time     Chewing/Swallowing NPO per SLP     Skin Intact        Estimated/Assessed Needs       Energy Requirements    EST Needs, Method, Wt used 2238-7686 kcal/day (25-35 kcal/kg current BW)       Protein Requirements    EST Needs, Method, Wt used  g/day (1.2-1.5 g/kg)       Fluid Requirements     Estimated Needs (mL/day) 1 mL/kcal or per MD; renal function worsening, may need to alter fluid recommendations      Fluid Deficit    Current Na Level (mEq/L) 149   Desired Na Level (mEq/L) 143   Estimated Fluid Deficit (L)  1600 mL fluid deficit per MD calc     Current Nutrition Orders & Evaluation of Intake       Oral Nutrition     Food Allergies NKFA   Current PO Diet NPO Diet NPO Type: Strict NPO   Supplement -   PO Evaluation     Trending % PO Intake 9/21: minimal nutrition x 3 days     Enteral Nutrition    Enteral Route No access at this time   Order, Modulars, Flushes    Residual/Tolerance    TF Observation         Parenteral Nutrition     TPN Route    Total # Days on TPN    TPN Order, Lipid Details    MVI & Trace Element Freq    TPN Observation         Nutrition Course Details    PO Diets: Mainly NPO since admission    Nutrition Support:      Nutritional Risk Screening        NRS-2002 Score          Nutrition Diagnosis         Nutrition Dx Problem 1 Moderate chronic disease related malnutrition related to Parkinson's disease as evidenced by PO intake meeting less than 75% of estimated energy requirement for greater than or equal to 1 month and moderate + severe (mainly moderate) muscle wasting per NFPE.       Nutrition Dx Problem 2        Intervention Goal         Intervention Goal(s) Nutrition  initiation in alignment with POC/GOC     Nutrition Intervention        RD Action Workup TF     Nutrition Prescription          Diet Prescription NPO   Supplement Prescription -     Enteral Prescription Initial Goal:  *initial goal conservative d/t risk of RFS     Nutren 1.5 at 20 mL/hr + 70 mL/hr water flush      End Goal:    Nutren 1.5 at 60 mL/hr + water flush per clinical course     Calories  1980 kcals (within range)    Protein  90 g (within range)    Free water  1003 mL    Flushes       The above end goal rate is for 22 hrs/day to assume interruptions for ADLs. Water flushes adjusted based on clinical picture + Rx flushes/IV fluids          TPN Prescription      Monitor/Evaluation        Monitor Per protocol, Pertinent labs, POC/GOC, Swallow function     Malnutrition Severity Assessment      Patient meets criteria for : Moderate (non-severe) Malnutrition  Malnutrition Type (last 8 hours)       Malnutrition Severity Assessment       Row Name 09/21/23 1407       Malnutrition Severity Assessment    Malnutrition Type Chronic Disease - Related Malnutrition      Row Name 09/21/23 1407       Insufficient Energy Intake     Insufficient Energy Intake Findings Moderate    Insufficient Energy Intake  <75% of est. energy requirement for > or equal to 1 month      Row Name 09/21/23 1407       Muscle Loss    Loss of Muscle Mass Findings Moderate    Archer Region Severe - deep hollowing/scooping, lack of muscle to touch, facial bones well defined    Clavicle Bone Region Moderate - some protrusion in females, visible in males    Acromion Bone Region Moderate - acromion may slightly protrude    Scapular Bone Region Moderate - mild depression, bones may show slightly      Row Name 09/21/23 1404       Criteria Met (Must meet criteria for severity in at least 2 of these categories: M Wasting, Fat Loss, Fluid, Secondary Signs, Wt. Status, Intake)    Patient meets criteria for  Moderate (non-severe) Malnutrition                        Electronically signed by:  Charlotte Munguia RD  09/21/23 13:57 EDT

## 2023-09-21 NOTE — CONSULTS
Referring Provider: Hospitalist    Attending: Jessica Roach MD    Reason for Consultation:    Atrial fibrillation with rapid ventricular response  Hypertension  Mental status changes  Pancreatitis  Choledocholithiasis    Chief complaint:    Confused       History of present illness:     Patient is 88-year-old white gentleman who has previous medical history of dementia, history of coronary artery disease, s/p coronary artery stenting, hypertension, Parkinson disease, nursing home resident, who was admitted for choledocholithiasis and biliary pancreatitis.    Patient developed pancreatitis and was found to be due to biliary stone.  He was brought for ERCP.  Patient underwent ERCP and underwent biliary stent.  Postoperatively patient went into atrial fibrillation today.  Cardiology consultation is requested.    Patient is drowsy.  Patient is arousable but does not and cannot give the history.  Patient is not in respiratory distress.  Patient denies any chest pain.  No abdominal distention.    Most of the history is taken from his wife as well as son.    EKG reviewed and patient was in sinus rhythm on the admission.    Review of Systems  Review of Systems   Unable to perform ROS: Acuity of condition     Past Medical History  Past Medical History:   Diagnosis Date    CAD (coronary artery disease)     Dementia     Parkinsons     Thyroid disease     and   Past Surgical History:   Procedure Laterality Date    ERCP N/A 9/18/2023    Procedure: ENDOSCOPIC RETROGRADE CHOLANGIOPANCREATOGRAPHY, SPHINTEROTOMY, SPHINTEROPLASTY, CLEARANCE OF BILE DUCT WITH BALLOON (12MM-15MM, UP TO 15MM) EXTRACTION OF BILIARY STONES WITH BALLOON , OCCLUSION CHOLANGIOGRAM,  PLACEMENT OF BILIARY STENT, ESOPHAGOGASTRODUODNESCOPY;  Surgeon: Ray Hitchcock MD;  Location: [facility] ENDOSCOPY;  Service: Gastroenterology;  Laterality: N/A;  POST: CHOLEDOCHOLE    LEFT HEART CATH         Family History  History reviewed. No pertinent family  "history.    Social History  Social History     Socioeconomic History    Marital status:    Tobacco Use    Smoking status: Former     Types: Cigarettes   Vaping Use    Vaping Use: Never used   Substance and Sexual Activity    Alcohol use: Yes     Alcohol/week: 7.0 standard drinks     Types: 7 Glasses of wine per week    Drug use: No    Sexual activity: Defer       Objective     Physical Exam:    Vital Signs  Vitals:    09/21/23 1540 09/21/23 1543 09/21/23 1758 09/21/23 1840   BP:   157/88 164/100   BP Location:       Patient Position:       Pulse: 110 99 (!) 134 (!) 131   Resp: 16 16 16    Temp:       TempSrc:       SpO2: 94% 98%     Weight:       Height:           Weight  Flowsheet Rows      Flowsheet Row First Filed Value   Admission Height 167.6 cm (66\") Documented at 09/18/2023 0332   Admission Weight 72.8 kg (160 lb 7.9 oz) Documented at 09/18/2023 0332             Constitutional:       Appearance: Well-developed.   Eyes:      General: No scleral icterus.        Right eye: No discharge.   HENT:      Head: Normocephalic and atraumatic.   Neck:      Thyroid: No thyromegaly.      Lymphadenopathy: No cervical adenopathy.   Pulmonary:      Effort: Pulmonary effort is normal. No respiratory distress.      Breath sounds: Normal breath sounds. No wheezing. No rales.   Cardiovascular:      Normal rate. Irregularly irregular rhythm.      No gallop.    Edema:     Peripheral edema absent.   Abdominal:      Tenderness: There is no abdominal tenderness.   Skin:     Findings: No erythema or rash.   Neurological:      Comments: Patient is confused       Results Review:  Lab Results (last 24 hours)       Procedure Component Value Units Date/Time    POC Glucose Once [[id number]]  (Abnormal) Collected: 09/21/23 1634    Specimen: Blood Updated: 09/21/23 1635     Glucose 129 mg/dL      Comment: Serial Number: [id number]:  [id number]       PTH, Intact [[id number]]  (Abnormal) Collected: 09/21/23 1433    Specimen: Blood " Updated: 09/21/23 1509     PTH, Intact 116.6 pg/mL     Narrative:      Results may be falsely decreased if patient taking Biotin.      Protein Elec + Interp, Serum [[id number]] Collected: 09/21/23 1432    Specimen: Blood Updated: 09/21/23 1439    TSH [[id number]]  (Normal) Collected: 09/21/23 0055    Specimen: Blood Updated: 09/21/23 1318     TSH 3.050 uIU/mL     Ferritin [[id number]]  (Normal) Collected: 09/21/23 0055    Specimen: Blood Updated: 09/21/23 1318     Ferritin 171.70 ng/mL     Narrative:      Results may be falsely decreased if patient taking Biotin.      Iron Profile [[id number]]  (Abnormal) Collected: 09/21/23 0055    Specimen: Blood Updated: 09/21/23 1313     Iron 12 mcg/dL      Iron Saturation (TSAT) 6 %      Transferrin 144 mg/dL      TIBC 215 mcg/dL     Uric Acid [[id number]]  (Abnormal) Collected: 09/21/23 0055    Specimen: Blood Updated: 09/21/23 1313     Uric Acid 7.1 mg/dL     CK [[id number]]  (Abnormal) Collected: 09/21/23 0055    Specimen: Blood Updated: 09/21/23 1313     Creatine Kinase 248 U/L     CBC & Differential [[id number]]  (Abnormal) Collected: 09/21/23 0954    Specimen: Blood Updated: 09/21/23 1035    Narrative:      The following orders were created for panel order CBC & Differential.  Procedure                               Abnormality         Status                     ---------                               -----------         ------                     CBC Auto Differential[[id number]]        Abnormal            Final result                 Please view results for these tests on the individual orders.    CBC Auto Differential [[id number]]  (Abnormal) Collected: 09/21/23 0954    Specimen: Blood Updated: 09/21/23 1035     WBC 11.20 10*3/mm3      RBC 2.97 10*6/mm3      Hemoglobin 10.0 g/dL      Hematocrit 30.8 %      .5 fL      MCH 33.7 pg      MCHC 32.5 g/dL      RDW 15.1 %      RDW-SD 58.6 fl      MPV 9.8 fL      Platelets 68 10*3/mm3      Neutrophil % 93.0 %      Lymphocyte %  3.2 %      Monocyte % 3.6 %      Eosinophil % 0.1 %      Basophil % 0.1 %      Neutrophils, Absolute 10.40 10*3/mm3      Lymphocytes, Absolute 0.40 10*3/mm3      Monocytes, Absolute 0.40 10*3/mm3      Eosinophils, Absolute 0.00 10*3/mm3      Basophils, Absolute 0.00 10*3/mm3      nRBC 0.1 /100 WBC     CANDIDA AURIS SCREEN - Swab, Axilla Right, Axilla Left and Groin [[id number]]  (Normal) Collected: 09/18/23 0535    Specimen: Swab from Axilla Right, Axilla Left and Groin Updated: 09/21/23 0600     Candida Auris Screen Culture No Candida auris isolated at 3 days    Comprehensive Metabolic Panel [[id number]]  (Abnormal) Collected: 09/21/23 0055    Specimen: Blood Updated: 09/21/23 0203     Glucose 159 mg/dL      BUN 85 mg/dL      Creatinine 2.75 mg/dL      Sodium 149 mmol/L      Potassium 3.9 mmol/L      Comment: Slight hemolysis detected by analyzer. Results may be affected.        Chloride 119 mmol/L      CO2 18.0 mmol/L      Calcium 7.7 mg/dL      Total Protein 5.2 g/dL      Albumin 2.4 g/dL      ALT (SGPT) 77 U/L      AST (SGOT) 48 U/L      Alkaline Phosphatase 65 U/L      Total Bilirubin 0.4 mg/dL      Globulin 2.8 gm/dL      A/G Ratio 0.9 g/dL      BUN/Creatinine Ratio 30.9     Anion Gap 12.0 mmol/L      eGFR 21.5 mL/min/1.73     Narrative:      GFR Normal >60  Chronic Kidney Disease <60  Kidney Failure <15    The GFR formula is only valid for adults with stable renal function between ages 18 and 70.    C-reactive Protein [[id number]]  (Abnormal) Collected: 09/21/23 0055    Specimen: Blood Updated: 09/21/23 0203     C-Reactive Protein 22.22 mg/dL     Protime-INR [[id number]]  (Abnormal) Collected: 09/21/23 0055    Specimen: Blood Updated: 09/21/23 0201     Protime 14.8 Seconds      INR 1.41          Imaging Results (Last 72 Hours)       Procedure Component Value Units Date/Time    US Renal Bilateral [[id number]] Collected: 09/21/23 1626     Updated: 09/21/23 1632    Narrative:      US RENAL BILATERAL    Date of Exam:  9/21/2023 3:08 PM EDT    Indication: ARF/MILENA/CRF/CKD.    Comparison: MRI abdomen 9/18/2023    Technique: Grayscale and color Doppler ultrasound evaluation of the kidneys and urinary bladder was performed.      Findings:  Right kidney measures 11.2 cm and the left kidney measures 11.2 cm in [id number] length. There is normal thickness and normal echogenicity of the renal parenchyma bilaterally. There is no evidence of hydronephrosis as demonstrated on prior MRI. There   is an anechoic 4.4 cm cyst of the lateral cortex of the mid left kidney. This appears unchanged from prior MRI as well. There is an additional 3.2 cm cyst of the upper pole of the left kidney also unchanged from prior MRI. There is color kidneys.    Limited imaging unremarkable.      Impression:      Impression:    1. No evidence of urinary tract obstruction.  2. There are 2 simple cyst within the left kidney, unchanged from prior MRI.        Electronically Signed: Nato Flores MD    9/21/2023 4:30 PM EDT    Workstation ID: [id number]    XR Chest 1 View [[id number]] Collected: 09/21/23 0843     Updated: 09/21/23 0849    Narrative:      XR CHEST 1 VW    Date of Exam: 9/21/2023 8:16 AM EDT    Indication: SOB    Comparison: September 19, 2023    Findings:  There is a calcified granuloma in the right lower lung. There are patchy bilateral airspace opacities. The heart and mediastinal contours appear normal. The osseous structures appear intact.      Impression:      Impression:  Patchy bilateral airspace opacities, which could reflect pulmonary edema or pneumonia.      Electronically Signed: Sourav Roberto MD    9/21/2023 8:47 AM EDT    Workstation ID: [id number]    CT Outside Films [[id number]] Resulted: 09/19/23 1553     Updated: 09/19/23 1553    Narrative:      This procedure was auto-finalized with no dictation required.    XR Chest 2 View [[id number]] Collected: 09/19/23 1534     Updated: 09/19/23 1537    Narrative:      XR CHEST 2 VW    Date of Exam:  9/19/2023 3:00 PM EDT    Indication: possible aspiration, requires more O2    Comparison: 9/18/2023    Findings:  Heart size is within normal limits. Pulmonary vessels are normal. Lung volumes are low. No focal airspace consolidation. No pleural effusion or pneumothorax. Calcified granuloma seen within the right lower lobe.      Impression:      Impression:    1. Low volume inspiration. No acute cardiopulmonary disease.      Electronically Signed: Nato Flores MD    9/19/2023 3:35 PM EDT    Workstation ID: [id number]          ECG 12 Lead Rhythm Change   Preliminary Result   HEART RATE= 122  bpm   RR Interval= 491  ms   MN Interval=   ms   P Horizontal Axis=   deg   P Front Axis=   deg   QRSD Interval= 127  ms   QT Interval= 341  ms   QTcB= 487  ms   QRS Axis= 2  deg   T Wave Axis= -12  deg   - ABNORMAL ECG -   Atrial fibrillation   Ventricular premature complex   Right bundle branch block   Borderline ST depression, lateral leads   Electronically Signed By:    Date and Time of Study: 2023-09-21 16:07:24      ECG 12 Lead QT Measurement   Final Result   HEART RATE= 83  bpm   RR Interval= 720  ms   MN Interval= 166  ms   P Horizontal Axis= 0  deg   P Front Axis= 7  deg   QRSD Interval= 124  ms   QT Interval= 403  ms   QTcB= 475  ms   QRS Axis= -12  deg   T Wave Axis= 10  deg   - ABNORMAL ECG -   Sinus rhythm   Right bundle branch block   No previous ECG available for comparison   Electronically Signed By: Montana Parker ([facility]) 19-Sep-2023 23:12:35   Date and Time of Study: 2023-09-19 07:01:56      SCANNED - TELEMETRY     Final Result      SCANNED - TELEMETRY     Final Result      SCANNED - TELEMETRY     Final Result      SCANNED - TELEMETRY     Final Result      SCANNED - TELEMETRY     Final Result      SCANNED - TELEMETRY     Final Result      SCANNED - TELEMETRY     Final Result      SCANNED - TELEMETRY     Final Result      SCANNED - TELEMETRY     Final Result        CBC    Results from last 7 days   Lab Units  09/21/23  0954 09/20/23  0031 09/19/23  0234 09/18/23  0717   WBC 10*3/mm3 11.20* 10.20 10.60 2.80*   HEMOGLOBIN g/dL 10.0* 10.4* 10.9* 12.6*   PLATELETS 10*3/mm3 68* 76* 108* 150     BMP   Results from last 7 days   Lab Units 09/21/23 0055 09/20/23 0031 09/19/23 0234 09/18/23  0717   SODIUM mmol/L 149* 143 145 140   POTASSIUM mmol/L 3.9 4.4 4.7 3.7   CHLORIDE mmol/L 119* 111* 110* 101   CO2 mmol/L 18.0* 19.0* 19.0* 19.0*   BUN mg/dL 85* 75* 61* 47*   CREATININE mg/dL 2.75* 2.66* 2.09* 1.26   GLUCOSE mg/dL 159* 127* 165* 229*     CMP   Results from last 7 days   Lab Units 09/21/23 0055 09/20/23 0031 09/19/23 0234 09/18/23  0717   SODIUM mmol/L 149* 143 145 140   POTASSIUM mmol/L 3.9 4.4 4.7 3.7   CHLORIDE mmol/L 119* 111* 110* 101   CO2 mmol/L 18.0* 19.0* 19.0* 19.0*   BUN mg/dL 85* 75* 61* 47*   CREATININE mg/dL 2.75* 2.66* 2.09* 1.26   GLUCOSE mg/dL 159* 127* 165* 229*   ALBUMIN g/dL 2.4* 2.3* 2.6* 3.5   BILIRUBIN mg/dL 0.4 0.6 0.7 0.9   ALK PHOS U/L 65 55 46 86   AST (SGOT) U/L 48* 95* 177* 172*   ALT (SGPT) U/L 77* 102* 125* 87*   LIPASE U/L  --  25 106* 474*     Medication Review  Scheduled Meds:albumin human, 25 g, Intravenous, Q8H  calcium gluconate, 1,000 mg, Intravenous, Q12H  carbidopa-levodopa, 2 tablet, Oral, TID  cefTRIAXone, 1,000 mg, Intravenous, Q24H  donepezil, 10 mg, Oral, Daily  furosemide, 40 mg, Intravenous, Daily  guaiFENesin-codeine, 10 mL, Oral, Q6H  insulin lispro, 2-7 Units, Subcutaneous, Q6H  ipratropium-albuterol, 3 mL, Nebulization, 4x Daily - RT  memantine, 10 mg, Oral, Nightly  metroNIDAZOLE, 500 mg, Intravenous, Q8H  pantoprazole, 40 mg, Intravenous, Q12H  sodium bicarbonate, 50 mEq, Intravenous, Q8H      Continuous Infusions:dilTIAZem, 5-15 mg/hr, Last Rate: 5 mg/hr (09/21/23 1840)  phenylephrine, 0.5-3 mcg/kg/min      PRN Meds:.  dextrose    dextrose    glucagon (human recombinant)    HYDROmorphone    ondansetron    ondansetron **OR** ondansetron    phenylephrine    Potassium  Replacement - Follow Nurse / BPA Driven Protocol    Assessment:      Cholelithiasis    Pancreatitis    Leukocytosis    Renal insufficiency    Parkinson's disease dementia    Hypothyroidism (acquired)    Hyperlipidemia    Hypokalemia    Essential hypertension    History of coronary artery disease    Choledocholithiasis    Moderate malnutrition        Plan:    MDM:    1.  Atrial fibrillation with rapid ventricular response:    Patient went into atrial fibrillation today.  I will start Cardizem to control heart rate.  Patient cannot take any p.o. medicines because of risk of aspiration patient is obtunded and lethargic.  I would also start amiodarone to possibly convert the patient.  Patient is having multiple bruising.  He will not be a candidate for long-term anticoagulation    2.  Hypertension:    Continue intravenous Cardizem for better control of blood pressure.    3.:  Choledocholithiasis s/p biliary stent    Further recommendation as per gastroenterologist    I discussed the patient's findings and my recommendations with patient's wife and his son and nurse    Eris Veliz MD  09/21/23  19:35 EDT

## 2023-09-21 NOTE — PLAN OF CARE
Goal Outcome Evaluation:              Outcome Evaluation: Patient currently resting abed, no distress noted. Patient's wife at bedside. Patient has appeared restless when soiled but calms after cleaned and repositioned.

## 2023-09-21 NOTE — NURSING NOTE
Patient had chest XR completed, see results. Patient home medications restarted, family concerned about parkinson's medication being held. Speech did not recommend a diet as patient is not alert to tolerate speech evaluation. Patient parkinson's medication unable to be changed to a different route and is only available as PO. Patient and family given the option for NG tube to be placed for medications only, patients family refused NG tube placement so medication is unable to be given. Educated family on the importance and family is understanding. Patient getting US completed during shift. Patient is alert to self only and family at bedside. Patient required multiple bed changes during shift, patient urinating and having dark stools.

## 2023-09-21 NOTE — PROGRESS NOTES
LOS: 3 days   Patient Care Team:  Tip Parikh MD as PCP - General (Family Medicine)      Subjective unable to fully assess secondary to mental status    Interval History: Wife reports some increased shortness of breath overnight.  Having bowel movements that are black.  Less agitation overnight.    ROS:   Unable to assess     Medication Review:     Current Facility-Administered Medications:     cefTRIAXone (ROCEPHIN) 1,000 mg in sodium chloride 0.9 % 100 mL IVPB, 1,000 mg, Intravenous, Q24H, Jessica Roach MD, Last Rate: 200 mL/hr at 09/20/23 1124, 1,000 mg at 09/20/23 1124    guaiFENesin-codeine (GUAIFENESIN AC) 100-10 MG/5ML liquid 10 mL, 10 mL, Oral, Q6H, Jessica Roach MD, 10 mL at 09/21/23 0528    HYDROmorphone (DILAUDID) injection 0.5 mg, 0.5 mg, Intravenous, Q4H PRN, Ray Hitchcock MD, 0.5 mg at 09/18/23 1045    ipratropium-albuterol (DUO-NEB) nebulizer solution 3 mL, 3 mL, Nebulization, 4x Daily - RT, Jessica Roach MD    metroNIDAZOLE (FLAGYL) IVPB 500 mg, 500 mg, Intravenous, Q8H, Jessica Roach MD, Last Rate: 100 mL/hr at 09/21/23 1041, 500 mg at 09/21/23 1041    ondansetron (ZOFRAN) injection 4 mg, 4 mg, Intravenous, Q6H PRN, Ray Hitchcock MD    ondansetron (ZOFRAN) tablet 4 mg, 4 mg, Oral, Q6H PRN **OR** ondansetron (ZOFRAN) injection 4 mg, 4 mg, Intravenous, Q6H PRN, Ray Hitchcock MD    pantoprazole (PROTONIX) injection 40 mg, 40 mg, Intravenous, Q12H, Ray Hitchcock MD, 40 mg at 09/21/23 1041    phenylephrine (DEENA-SYNEPHRINE) 50 mg in sodium chloride 0.9 % 250 mL infusion, 0.5-3 mcg/kg/min, Intravenous, Continuous PRN, Ray Hitchcock MD    Potassium Replacement - Follow Nurse / BPA Driven Protocol, , Does not apply, PRN, Ray Hitchcock MD    sodium chloride 0.9 % infusion, 150 mL/hr, Intravenous, Continuous, Ray Hitchcock MD, Last Rate: 150 mL/hr at 09/21/23 0528, 150 mL/hr at 09/21/23 0528      Objective chronically ill-appearing but no acute distress, family  at bedside.    Vital Signs  Vitals:    09/20/23 0425 09/20/23 1140 09/20/23 2006 09/21/23 0410   BP: 113/69 141/77 144/75 134/94   BP Location: Left arm Left arm Left arm Right arm   Patient Position: Lying Lying Lying Lying   Pulse: 82 76 86 82   Resp: 18 18 19 17   Temp: 98 °F (36.7 °C)  98.1 °F (36.7 °C) 97.7 °F (36.5 °C)   TempSrc: Oral  Oral Oral   SpO2:  93% 93% 92%   Weight: 74 kg (163 lb 2.3 oz)      Height:           Physical Exam:     General Appearance:    Awake and alert, in no acute distress although ill-appearing   Head:    Normocephalic, without obvious abnormality   Eyes:          Conjunctivae normal, anicteric sclera   Throat:   No oral lesions, no thrush, oral mucosa moist   Neck:   supple, no JVD   Lungs:   Mild dyspnea at rest, 3 L nasal cannula   Abdomen:     Soft, non-tender, nondistended   Rectal:     Deferred   Extremities:    no cyanosis   Skin:   No bruising or rash, no jaundice        Results Review:    CBC    Results from last 7 days   Lab Units 09/21/23  0954 09/20/23  0031 09/19/23  0234 09/18/23  0717   WBC 10*3/mm3 11.20* 10.20 10.60 2.80*   HEMOGLOBIN g/dL 10.0* 10.4* 10.9* 12.6*   PLATELETS 10*3/mm3 68* 76* 108* 150     CMP   Results from last 7 days   Lab Units 09/21/23  0055 09/20/23  0031 09/19/23  0234 09/18/23  0717   SODIUM mmol/L 149* 143 145 140   POTASSIUM mmol/L 3.9 4.4 4.7 3.7   CHLORIDE mmol/L 119* 111* 110* 101   CO2 mmol/L 18.0* 19.0* 19.0* 19.0*   BUN mg/dL 85* 75* 61* 47*   CREATININE mg/dL 2.75* 2.66* 2.09* 1.26   GLUCOSE mg/dL 159* 127* 165* 229*   ALBUMIN g/dL 2.4* 2.3* 2.6* 3.5   BILIRUBIN mg/dL 0.4 0.6 0.7 0.9   ALK PHOS U/L 65 55 46 86   AST (SGOT) U/L 48* 95* 177* 172*   ALT (SGPT) U/L 77* 102* 125* 87*   LIPASE U/L  --  25 106* 474*     Cr Clearance Estimated Creatinine Clearance: 19.4 mL/min (A) (by C-G formula based on SCr of 2.75 mg/dL (H)).  Coag   Results from last 7 days   Lab Units 09/21/23  0055 09/18/23  0717   INR  1.41* 1.05     HbA1C No results  found for: HGBA1C  Blood Glucose No results found for: POCGLU  Infection   Results from last 7 days   Lab Units 09/18/23  0557   URINECX  >100,000 CFU/mL Escherichia coli*     UA    Results from last 7 days   Lab Units 09/18/23  0557   NITRITE UA  Positive*   WBC UA /HPF Too Numerous to Count*   BACTERIA UA /HPF 4+*   SQUAM EPITHEL UA /HPF 0-2   URINECX  >100,000 CFU/mL Escherichia coli*     Radiology(recent) XR Chest 2 View    Result Date: 9/19/2023  Impression: 1. Low volume inspiration. No acute cardiopulmonary disease. Electronically Signed: Nato Flores MD  9/19/2023 3:35 PM EDT  Workstation ID: [id number]    XR Chest 1 View    Result Date: 9/21/2023  Impression: Patchy bilateral airspace opacities, which could reflect pulmonary edema or pneumonia. Electronically Signed: Sourav Roberto MD  9/21/2023 8:47 AM EDT  Workstation ID: [id number]        Assessment & Plan   Acute epigastric pain with nausea and vomiting-resolved  Choledocholithiasis s/p ERCP with bile duct clearance  Elevated LFTs-improving  Elevated lipase-resolved  Macrocytic anemia -stable  UTI  Parkinson's disease/dementia  History of CAD/stent     PLAN:  Patient is an 88-year-old male with Parkinson's dementia and CAD/stent who presents from the nursing facility with acute onset epigastric pain with nausea and vomiting.  CT at outlying facility shows cholelithiasis with distended gallbladder and mild biliary dilation.  Mildly elevated AST and ALT with lipase 4400 at [facility] hospital.      ERCP/EGD 9/18/2023 with sphincterotomy/sphincteroplasty and CBD stone extraction.  Medium hiatal hernia and dark-colored liquid as well as food filled most of the stomach.  There was also suspicion of aspiration at the end of the procedure.     CRP still significantly elevated at 22 but improving.  Chest x-ray suggest pulmonary edema or pneumonia, currently on ceftriaxone and metronidazole.  Hemoglobin currently stable at 10 although having black stool, continue  twice daily PPI and monitor.  Speech therapy at bedside evaluating safety for oral nutrition.  Consider diet advancement if passes bedside swallow eval.  No plan for surgical intervention by general surgery as he is high risk for cholecystectomy.  Renal function continues to worsen, consider nephrology consultation.  Continue supportive care we will follow closely    Electronically signed by LUI Cooper, 09/21/23, 10:46 AM EDT.

## 2023-09-21 NOTE — CONSULTS
NEPHROLOGY CONSULTATION-----[facility]    [facility]  [phone]  Megha Chen MD    Patient Care Team:  Tip Parikh MD as PCP - General (Family Medicine)  April, MD Viviane as Consulting Physician (Nephrology)    CC/REASON FOR CONSULTATION: RENAL FAILURE/ELEVATED SERUM CREATININE    PHYSICIAN REQUESTING CONSULTATION:     History of Present Illness    HPI    Patient is a 88 y.o. WM whom I was asked to see in consultation for evaluation and management of renal failure/elevated serum creatinine. Patient was admitted asa transfer from [facility] where he presented with c/o abdominal pain. Patient's wife is in room and gives majority of history as patient has dementia and is a poor historian. Per patient's wife, patient without prior knowledge of functional kidney disease, proteinuria, or hematuria. No recent IV dye exposure. + NSAID use in the form of Ibuprofen and Naproxen at nursing home prior to admission. No known h/o hepatitis, TB, rheumatic fever, jaundice. Patient does bleed/bruise easily. +Urinary hesitancy, frequency, and incontinence. No edema or fluid retention.  +Compliance with meds at nursing home. Was on diuretics prior to admission in the form of HCTZ. Was not on ACE-I/ARB prior to admission. No herbal med use.    Review of Systems   Constitutional:  Positive for activity change, appetite change and fatigue. Negative for chills, diaphoresis, fever and unexpected weight change.   HENT:  Negative for congestion, dental problem, drooling, ear discharge, ear pain, facial swelling, hearing loss, mouth sores, nosebleeds, postnasal drip, rhinorrhea, sinus pressure, sinus pain, sneezing, sore throat, tinnitus, trouble swallowing and voice change.    Eyes:  Negative for photophobia, pain, discharge, redness, itching and visual disturbance.   Respiratory:  Positive for cough. Negative for apnea, choking, chest  tightness, shortness of breath, wheezing and stridor.    Cardiovascular:  Negative for chest pain, palpitations and leg swelling.   Gastrointestinal:  Positive for abdominal pain. Negative for abdominal distention, anal bleeding, blood in stool, constipation, diarrhea, nausea, rectal pain and vomiting.   Endocrine: Negative for cold intolerance, heat intolerance, polydipsia, polyphagia and polyuria.   Genitourinary:  Positive for difficulty urinating, frequency and urgency. Negative for decreased urine volume, dysuria, enuresis, flank pain, genital sores and hematuria.   Musculoskeletal:  Positive for arthralgias, back pain and myalgias. Negative for gait problem, joint swelling, neck pain and neck stiffness.   Skin:  Negative for color change, pallor, rash and wound.   Allergic/Immunologic: Negative for environmental allergies, food allergies and immunocompromised state.   Neurological:  Positive for weakness. Negative for dizziness, tremors, seizures, syncope, facial asymmetry, speech difficulty, light-headedness, numbness and headaches.   Hematological:  Negative for adenopathy. Bruises/bleeds easily.   Psychiatric/Behavioral:  Positive for confusion and dysphoric mood. Negative for agitation, behavioral problems, decreased concentration, hallucinations, self-injury, sleep disturbance and suicidal ideas. The patient is not nervous/anxious and is not hyperactive.         Past Medical History:   Diagnosis Date    CAD (coronary artery disease)     Dementia     Parkinsons     Thyroid disease        Past Surgical History:   Procedure Laterality Date    ERCP N/A 9/18/2023    Procedure: ENDOSCOPIC RETROGRADE CHOLANGIOPANCREATOGRAPHY, SPHINTEROTOMY, SPHINTEROPLASTY, CLEARANCE OF BILE DUCT WITH BALLOON (12MM-15MM, UP TO 15MM) EXTRACTION OF BILIARY STONES WITH BALLOON , OCCLUSION CHOLANGIOGRAM,  PLACEMENT OF BILIARY STENT, ESOPHAGOGASTRODUODNESCOPY;  Surgeon: Ray Hitchcock MD;  Location: [facility] ENDOSCOPY;  Service:  Gastroenterology;  Laterality: N/A;  POST: CHOLEDOCHOLE    LEFT HEART CATH         History reviewed. No pertinent family history.    Social History     Tobacco Use    Smoking status: Former     Types: Cigarettes   Vaping Use    Vaping Use: Never used   Substance Use Topics    Alcohol use: Yes     Alcohol/week: 7.0 standard drinks     Types: 7 Glasses of wine per week    Drug use: No       Home Meds:   Medications Prior to Admission   Medication Sig Dispense Refill Last Dose    acetaminophen (TYLENOL) 500 MG tablet Take 1 tablet by mouth Daily.   9/17/2023    aspirin 81 MG EC tablet Take 1 tablet by mouth Daily.   9/17/2023    atenolol (TENORMIN) 25 MG tablet Take 0.5 tablets by mouth Daily.   9/17/2023    carbidopa-levodopa (SINEMET)  MG per tablet Take 2 tablets by mouth 3 (Three) Times a Day.   9/17/2023    donepezil (ARICEPT) 10 MG tablet Take 1 tablet by mouth Daily.   9/17/2023    doxazosin (CARDURA) 4 MG tablet Take 0.5 tablets by mouth Daily.   9/17/2023    finasteride (PROSCAR) 5 MG tablet Take 1 tablet by mouth Daily.   9/17/2023    gabapentin (NEURONTIN) 300 MG capsule Take 1 capsule by mouth 3 (Three) Times a Day.   9/17/2023    hydroCHLOROthiazide (MICROZIDE) 12.5 MG capsule Take 1 capsule by mouth Daily.   9/17/2023    melatonin 5 MG tablet tablet Take 1 tablet by mouth Every Night.   9/17/2023    memantine (NAMENDA) 10 MG tablet Take 1 tablet by mouth Every Night.   9/17/2023    Naproxen Sodium 220 MG capsule Take 440 mg by mouth Daily.   9/17/2023    omeprazole (priLOSEC) 20 MG capsule Take 1 capsule by mouth Daily.   9/17/2023    vitamin B-12 (CYANOCOBALAMIN) 1000 MCG tablet Take 1 tablet by mouth Daily.   9/17/2023    Ascorbic Acid ([employer] Vitamin C Drops) 60 MG lozenge Dissolve 1 lozenge in the mouth 3 (Three) Times a Day As Needed.       Miconazole Nitrate 2 % aerosol Apply 1 application  topically 2 (Two) Times a Day.          Scheduled Meds:  carbidopa-levodopa, 2 tablet, Oral,  TID  cefTRIAXone, 1,000 mg, Intravenous, Q24H  donepezil, 10 mg, Oral, Daily  furosemide, 40 mg, Intravenous, Once  guaiFENesin-codeine, 10 mL, Oral, Q6H  ipratropium-albuterol, 3 mL, Nebulization, 4x Daily - RT  memantine, 10 mg, Oral, Nightly  metroNIDAZOLE, 500 mg, Intravenous, Q8H  pantoprazole, 40 mg, Intravenous, Q12H        Continuous Infusions:  phenylephrine, 0.5-3 mcg/kg/min  sodium chloride, 150 mL/hr, Last Rate: 150 mL/hr (09/21/23 0528)        PRN Meds:    HYDROmorphone    ondansetron    ondansetron **OR** ondansetron    phenylephrine    Potassium Replacement - Follow Nurse / BPA Driven Protocol    Allergies:  Patient has no known allergies.    OBJECTIVE    Vital Signs  Temp:  [97.7 °F (36.5 °C)-98.7 °F (37.1 °C)] 98.7 °F (37.1 °C)  Heart Rate:  [] 101  Resp:  [16-19] 19  BP: (134-144)/(75-94) 134/94    No intake/output data recorded.  I/O last 3 completed shifts:  In: 2260 [P.O.:60; I.V.:2000; IV Piggyback:200]  Out: -     Physical Exam:  General Appearance: alert, appears stated age and cooperative  Head: normocephalic, without obvious abnormality and atraumatic +DRY OP WITH POOR DENTITION  Eyes: conjunctivae and sclerae normal and no icterus  Neck: supple and no JVD  Lungs: +BIBASILAR RALES AND SCATTERED RHONCHI  Heart: regular rhythm & normal rate and normal S1, S2 +SARAH  Chest Wall: no abnormalities observed  Abdomen: normal bowel sounds and soft, nontender  Extremities: moves extremities well, no edema, no cyanosis +DJD  Skin: +NUMEROUS SCATTERED ECCHYMOSIS AND DECREASED SKIN TURGOR  Neurologic: Alert but NOT ORIENTED. No focal deficits    Results Review:    I reviewed the patient's new clinical results.    WBC WBC   Date Value Ref Range Status   09/21/2023 11.20 (H) 3.40 - 10.80 10*3/mm3 Final   09/20/2023 10.20 3.40 - 10.80 10*3/mm3 Final   09/19/2023 10.60 3.40 - 10.80 10*3/mm3 Final      HGB Hemoglobin   Date Value Ref Range Status   09/21/2023 10.0 (L) 13.0 - 17.7 g/dL Final   09/20/2023  10.4 (L) 13.0 - 17.7 g/dL Final   09/19/2023 10.9 (L) 13.0 - 17.7 g/dL Final      HCT Hematocrit   Date Value Ref Range Status   09/21/2023 30.8 (L) 37.5 - 51.0 % Final   09/20/2023 31.4 (L) 37.5 - 51.0 % Final   09/19/2023 32.3 (L) 37.5 - 51.0 % Final      Platelets No results found for: LABPLAT   MCV MCV   Date Value Ref Range Status   09/21/2023 103.5 (H) 79.0 - 97.0 fL Final   09/20/2023 104.1 (H) 79.0 - 97.0 fL Final   09/19/2023 104.2 (H) 79.0 - 97.0 fL Final          Sodium Sodium   Date Value Ref Range Status   09/21/2023 149 (H) 136 - 145 mmol/L Final   09/20/2023 143 136 - 145 mmol/L Final   09/19/2023 145 136 - 145 mmol/L Final      Potassium Potassium   Date Value Ref Range Status   09/21/2023 3.9 3.5 - 5.2 mmol/L Final     Comment:     Slight hemolysis detected by analyzer. Results may be affected.   09/20/2023 4.4 3.5 - 5.2 mmol/L Final   09/19/2023 4.7 3.5 - 5.2 mmol/L Final     Comment:     Result checked        Chloride Chloride   Date Value Ref Range Status   09/21/2023 119 (H) 98 - 107 mmol/L Final   09/20/2023 111 (H) 98 - 107 mmol/L Final   09/19/2023 110 (H) 98 - 107 mmol/L Final      CO2 CO2   Date Value Ref Range Status   09/21/2023 18.0 (L) 22.0 - 29.0 mmol/L Final   09/20/2023 19.0 (L) 22.0 - 29.0 mmol/L Final   09/19/2023 19.0 (L) 22.0 - 29.0 mmol/L Final      BUN BUN   Date Value Ref Range Status   09/21/2023 85 (H) 8 - 23 mg/dL Final   09/20/2023 75 (H) 8 - 23 mg/dL Final   09/19/2023 61 (H) 8 - 23 mg/dL Final      Creatinine Creatinine   Date Value Ref Range Status   09/21/2023 2.75 (H) 0.76 - 1.27 mg/dL Final   09/20/2023 2.66 (H) 0.76 - 1.27 mg/dL Final   09/19/2023 2.09 (H) 0.76 - 1.27 mg/dL Final      Calcium Calcium   Date Value Ref Range Status   09/21/2023 7.7 (L) 8.6 - 10.5 mg/dL Final   09/20/2023 7.4 (L) 8.6 - 10.5 mg/dL Final   09/19/2023 7.5 (L) 8.6 - 10.5 mg/dL Final      PO4 No results found for: CAPO4   Albumin Albumin   Date Value Ref Range Status   09/21/2023 2.4 (L)  3.5 - 5.2 g/dL Final   09/20/2023 2.3 (L) 3.5 - 5.2 g/dL Final   09/19/2023 2.6 (L) 3.5 - 5.2 g/dL Final      Magnesium No results found for: MG   Uric Acid No results found for: URICACID       Imaging Results (Last 72 Hours)       Procedure Component Value Units Date/Time    XR Chest 1 View [[id number]] Collected: 09/21/23 0843     Updated: 09/21/23 0849    Narrative:      XR CHEST 1 VW    Date of Exam: 9/21/2023 8:16 AM EDT    Indication: SOB    Comparison: September 19, 2023    Findings:  There is a calcified granuloma in the right lower lung. There are patchy bilateral airspace opacities. The heart and mediastinal contours appear normal. The osseous structures appear intact.      Impression:      Impression:  Patchy bilateral airspace opacities, which could reflect pulmonary edema or pneumonia.      Electronically Signed: Sourav Roberto MD    9/21/2023 8:47 AM EDT    Workstation ID: [id number]    CT Outside Films [[id number]] Resulted: 09/19/23 1553     Updated: 09/19/23 1553    Narrative:      This procedure was auto-finalized with no dictation required.    XR Chest 2 View [[id number]] Collected: 09/19/23 1534     Updated: 09/19/23 1537    Narrative:      XR CHEST 2 VW    Date of Exam: 9/19/2023 3:00 PM EDT    Indication: possible aspiration, requires more O2    Comparison: 9/18/2023    Findings:  Heart size is within normal limits. Pulmonary vessels are normal. Lung volumes are low. No focal airspace consolidation. No pleural effusion or pneumothorax. Calcified granuloma seen within the right lower lobe.      Impression:      Impression:    1. Low volume inspiration. No acute cardiopulmonary disease.      Electronically Signed: Nato Flores MD    9/19/2023 3:35 PM EDT    Workstation ID: [id number]    FL ercp biliary duct only [[id number]] Collected: 09/18/23 1835     Updated: 09/18/23 1838    Narrative:      FL ERCP BILIARY DUCT ONLY    Date of Exam: 9/18/2023 5:13 PM EDT    Indication: ERCP, stent  placement.    Comparison: None available.    Technique:  A series of radiographic digital spot films were obtained in conjunction with a endoscopic catheterization of the ductal system, performed by the gastroenterologist.    Fluoroscopic Time: 2.5-minute    Findings:    Images demonstrate cannula contrast injection into the common bile duct. Multiple balloon sweeps were made. Last image demonstrate successful deployment of common bile duct stent.         Impression:      Impression:  Successful deployment of common bile duct stent      Electronically Signed: Nato Flores MD    9/18/2023 6:36 PM EDT    Workstation ID: [id number]    MRI abdomen wo contrast mrcp [[id number]] Collected: 09/18/23 1247     Updated: 09/18/23 1258    Narrative:      MRI ABDOMEN WO CONTRAST MRCP    Date of Exam: 9/18/2023 12:10 PM EDT    Indication: acute abdominal pain, elevated lipase, biliary dilation on CT.     Comparison: KUB 9/18/2023.    Technique:  Routine multiplanar/multisequence images of the abdomen were obtained with MRCP sequences without contrast administration.      Findings:  Multiple sequences are degraded by motion.    There is fusiform dilation of the common bile duct up to 12 mm, with abrupt transition at the ampulla. 3 tiny gallstones are seen within the distal common bile duct, best seen on series 18 image 11, measuring only a couple millimeters each. No   obstructing pancreatic or CBD mass lesion is identified. Intrahepatic bile ducts appear within normal limits. Pancreatic duct caliber is within normal limits. No evidence of pancreatic divisum.    A couple of stones seen within the pancreatic neck and cystic duct on both series 3 image 15 and series 10 image 18, measuring about 3 to 4 mm each..    Small quantity pericholecystic fluid is present, which may simply be related to ascites seen elsewhere within the upper abdomen. The gallbladder wall itself does not appear thickened..    Left renal cysts are present, the  dominant at the lower pole measuring about 4 cm. Tiny right renal cysts are also suggested.    The pancreas appears moderately atrophic and fatty replaced. The adrenals aren't within normal limits. A T2 hyperintense lesion at the lower pole of the spleen measuring 10 mm is statistically favored to represent a benign finding such as a cyst.    Small esophageal hiatal hernia is noted. There is mild posterior bibasilar atelectasis, greatest on the right, with trace right basilar pleural effusion. Heart size is within normal limits.    Advanced degenerative disc endplate changes are present in the mid lumbar spine. There is moderate lumbar dextroscoliotic curvature.      Impression:      1. Small gallstones are present. There is evidence of choledocholithiasis.  2. There is abrupt tapered narrowing of the common bile duct near the level of the ampulla, although no obstructing abnormality is seen. The findings raise the possibility of ampullary stenosis, with associated 12 mm fusiform common bile duct dilation.   Consider ERCP correlation.  3. Small quantity ascites is seen within the right upper quadrant of the abdomen. Pericholecystic fluid is present, although there is no gallbladder wall thickening. Cholecystitis cannot be excluded.  4. Trace right basilar pleural effusion. Mild posterior bibasilar atelectasis, greatest on the right.  5. Small esophageal hiatal hernia.  Per 6. Left renal cysts.  7. Moderate pancreatic atrophy.    Electronically Signed: Kaur Ward MD    9/18/2023 12:56 PM EDT    Workstation ID: [id number]              Results for orders placed during the hospital encounter of 09/18/23    XR Chest 1 View    Narrative  XR CHEST 1 VW    Date of Exam: 9/21/2023 8:16 AM EDT    Indication: SOB    Comparison: September 19, 2023    Findings:  There is a calcified granuloma in the right lower lung. There are patchy bilateral airspace opacities. The heart and mediastinal contours appear normal. The osseous  structures appear intact.    Impression  Impression:  Patchy bilateral airspace opacities, which could reflect pulmonary edema or pneumonia.      Electronically Signed: Sourav Roberto MD  9/21/2023 8:47 AM EDT  Workstation ID: [id number]      XR Chest 2 View    Narrative  XR CHEST 2 VW    Date of Exam: 9/19/2023 3:00 PM EDT    Indication: possible aspiration, requires more O2    Comparison: 9/18/2023    Findings:  Heart size is within normal limits. Pulmonary vessels are normal. Lung volumes are low. No focal airspace consolidation. No pleural effusion or pneumothorax. Calcified granuloma seen within the right lower lobe.    Impression  Impression:    1. Low volume inspiration. No acute cardiopulmonary disease.      Electronically Signed: Nato Flores MD  9/19/2023 3:35 PM EDT  Workstation ID: [id number]      XR Skull Lateral & Ap    Narrative  XR SKULL LATERAL AND AP    Date of Exam: 9/18/2023 11:44 AM EDT    Indication: needed prior to MRI    Comparison: None available.    Technique: 2 views of the skull were obtained.    Findings:  No unexpected retained radiopaque foreign body is seen within the skull or imaged neck. An artifact of a button, external to the patient, projects over the lower neck soft tissues on the lateral view. No suspicious osseous abnormalities. The patient is  edentulous. There degenerative changes of the cervical spine, most notable at C5-6    Impression  Impression: No unexpected retained opaque foreign body is seen in the skull.    Electronically Signed: Kaur Ward MD  9/18/2023 11:48 AM EDT  Workstation ID: [id number]            ASSESSMENT / PLAN      Cholelithiasis    Pancreatitis    Leukocytosis    Renal insufficiency    Parkinson's disease dementia    Hypothyroidism (acquired)    Hyperlipidemia    Hypokalemia    Essential hypertension    History of coronary artery disease    Choledocholithiasis      RENAL FAILURE-------Nonoliguric. +ARF/MILENA on top of what appears to be  CRF/CKD STG  3A with a baseline serum creatinine of about 1.26. Unknown if patient has baseline proteinuria or hematuria. CRF/CKD STG 3A likely secondary to HTN NS. +ARF/MILENA appears to be multifactorial and secondary to prerenal state/intravascular volume depletion with concomitant HCTZ use and from  ATN from hypotension and concomitant Napraxen use. D/C Naproxen and no NSAIDs. D/C HCTZ. Hydrate. Avoid hypotension. Check urine and serum studies and renal US and PVR. No IV dye. Dose meds for CrCl less than 10 cc/min. Counseled patient and his wife on disease state. Follow for renal replacement therapy need    2. ANEMIA------Macrocytic and normal RDW. Check Fe and SPEP and B12 and folate levels    3. HYPERNATREMIA------Free water deficit of about 3 liters. Hypotonic IVFs and d/c HCTZ and follow    4. ACIDOSIS-------Metabolic. No elevation in AGAP. +Type 4 RTA. Give IV NaHCO3 and follow    5. HYPOCALCEMIA-------Replace IV and follow ionized levels    6. HYPOALBUMINEMIA------IV Albumin to temporize. Check SPEP    7. ELEVATED LFTS/TRANSAMINITIS/CHOLEDOCHOLITHIASIS-------per GI and General Surgery. S/P CBD stent    8. OA/DJD-------No NSAIDs. Check uric acid levels    9. DEMENTIA/PARKINSON'S    10. GLYCEMIC CONTROL--------Glucometers, SSI    11. E. COLI UTI-------On  Rocephin    12. DVT PROPHYLAXIS-----SCDs    13. GERD/PUD PROPHYLAXIS------PPI. Benefits outweigh risks despite renal dysfunction    14. ?PNA      I discussed the patient's findings and my recommendations with patient, family, and nursing staff    Will follow along closely. Thank you for allowing us to see this patient in renal consultation.    [facility]  [phone]  MD Megha Allan MD  09/21/23  12:35 EDT

## 2023-09-22 NOTE — CONSULTS
Nutrition Services    Patient Name:  Magnus Talbot  YOB: 1934  MRN: [id number]  Admit Date:  9/18/2023    Received consult for TF. Discussed with attending and with nurse via secure message. Per nurse report, family not quite ready for hospice and now considering enteral nutrition. Attending suggests placing NG tonight, and holding off on starting formula until potentially tomorrow -- allow GI to re-visit and determine if safe to begin feeding.    When medically appropriate to begin feeding per GI, recommend the following:     Initial Goal:  *initial goal conservative d/t risk of RFS     Nutren 1.5 at 20 mL/hr + 70 mL/hr water flush      End Goal:     Nutren 1.5 at 60 mL/hr + water flush per clinical course     Calories  1980 kcals (within range)    Protein  90 g (within range)    Free water  1003 mL    Flushes  Will adjust per clinical picture      The above end goal rate is for 22 hrs/day to assume interruptions for ADLs. Water flushes adjusted based on clinical picture + Rx flushes/IV fluids     Electronically signed by:  Darlene Ferrer RD  09/22/23 19:04 EDT

## 2023-09-22 NOTE — PROGRESS NOTES
Nutrition Services    Patient Name: Magnus Talbot  YOB: 1934  MRN: [id number]  Admission date: 9/18/2023    Addendum: See TF note from today at 19:04 for additional updates.    PPE Documentation        PPE Worn By Provider Did not enter room for this encounter   PPE Worn By Patient  N/A     PROGRESS NOTE      Encounter Information: Progress note to check on plan for nutrition. Pt remains unable to take anything PO and is very lethargic. Family is considering hospice and thinking about goals of care. Per secure chat with attending, no TF planned for today until further decision-making can be done.        PO Diet: NPO Diet NPO Type: Strict NPO   PO Supplements: None ordered   PO Intake:  NPO       Current nutrition support: None ordered currently    Nutrition support review:        Labs (reviewed below): Hypernatremia - has D5W infusing currently   BUN/Cr elevated - monitor   Hyperglycemia - ongoing, no TF/nutrition infusing  Hypermagnesemia - monitor        GI Function:  Stool Output  Stool Unmeasured Occurrence: 1 (09/21/23 1050)  Bowel Incontinence: Yes (09/21/23 1050)  Stool Amount: small (09/21/23 1050)          Nutrition Intervention Updates: Continue to monitor goals of care.     If TF to begin, please send tube feeding assessment consult.       Results from last 7 days   Lab Units 09/22/23  0243 09/21/23  0055 09/20/23  0031   SODIUM mmol/L 155* 149* 143   POTASSIUM mmol/L 3.5 3.9 4.4   CHLORIDE mmol/L 120* 119* 111*   CO2 mmol/L 20.0* 18.0* 19.0*   BUN mg/dL 72* 85* 75*   CREATININE mg/dL 2.09* 2.75* 2.66*   CALCIUM mg/dL 9.0 7.7* 7.4*   BILIRUBIN mg/dL 0.3 0.4 0.6   ALK PHOS U/L 70 65 55   ALT (SGPT) U/L 62* 77* 102*   AST (SGOT) U/L 24 48* 95*   GLUCOSE mg/dL 237* 159* 127*     Results from last 7 days   Lab Units 09/22/23  0243   MAGNESIUM mg/dL 2.7*   PHOSPHORUS mg/dL 3.3   HEMOGLOBIN g/dL 8.7*   HEMATOCRIT % 26.2*     No results found for: COVID19  No results found for: HGBA1C      RD to  follow up per protocol.    Electronically signed by:  Darlene Ferrer RD  09/22/23 16:09 EDT

## 2023-09-22 NOTE — PLAN OF CARE
Goal Outcome Evaluation:      Patient was not appropriate for reevaluation of swallow function.  ST will f/u tomorrow in attempt for reevaluation if patient is appropriate.

## 2023-09-22 NOTE — PLAN OF CARE
Problem: Adult Inpatient Plan of Care  Goal: Plan of Care Review  Outcome: Ongoing, Progressing  Goal: Patient-Specific Goal (Individualized)  Outcome: Ongoing, Progressing  Goal: Absence of Hospital-Acquired Illness or Injury  Outcome: Ongoing, Progressing  Intervention: Identify and Manage Fall Risk  Recent Flowsheet Documentation  Taken 9/22/2023 1400 by Ericka Green LPN  Safety Promotion/Fall Prevention:   activity supervised   assistive device/personal items within reach   clutter free environment maintained   lighting adjusted   room organization consistent   safety round/check completed  Taken 9/22/2023 1200 by Ericka Green LPN  Safety Promotion/Fall Prevention:   activity supervised   assistive device/personal items within reach   clutter free environment maintained   lighting adjusted   room organization consistent   safety round/check completed  Taken 9/22/2023 1000 by Ericka Green LPN  Safety Promotion/Fall Prevention:   activity supervised   assistive device/personal items within reach   clutter free environment maintained   lighting adjusted   room organization consistent   safety round/check completed  Taken 9/22/2023 0800 by Ericka Green LPN  Safety Promotion/Fall Prevention:   activity supervised   assistive device/personal items within reach   clutter free environment maintained   lighting adjusted   room organization consistent   safety round/check completed  Intervention: Prevent Skin Injury  Recent Flowsheet Documentation  Taken 9/22/2023 0800 by Ericka Green LPN  Skin Protection:   adhesive use limited   incontinence pads utilized   tubing/devices free from skin contact  Intervention: Prevent and Manage VTE (Venous Thromboembolism) Risk  Recent Flowsheet Documentation  Taken 9/22/2023 0800 by Ericka Green LPN  VTE Prevention/Management:   bilateral   sequential compression devices off   patient refused intervention  Range of Motion: ROM (range of motion)  performed  Intervention: Prevent Infection  Recent Flowsheet Documentation  Taken 9/22/2023 1400 by Ericka Green LPN  Infection Prevention:   cohorting utilized   environmental surveillance performed   hand hygiene promoted   personal protective equipment utilized   rest/sleep promoted   single patient room provided   visitors restricted/screened  Taken 9/22/2023 1200 by Ericka Green LPN  Infection Prevention:   cohorting utilized   environmental surveillance performed   hand hygiene promoted   personal protective equipment utilized   rest/sleep promoted   single patient room provided   visitors restricted/screened  Taken 9/22/2023 1000 by Ericka Green LPN  Infection Prevention:   cohorting utilized   environmental surveillance performed   hand hygiene promoted   personal protective equipment utilized   rest/sleep promoted   single patient room provided   visitors restricted/screened  Taken 9/22/2023 0800 by Ericka Green LPN  Infection Prevention:   cohorting utilized   visitors restricted/screened   single patient room provided   rest/sleep promoted   personal protective equipment utilized   hand hygiene promoted   environmental surveillance performed  Goal: Optimal Comfort and Wellbeing  Outcome: Ongoing, Progressing  Intervention: Monitor Pain and Promote Comfort  Recent Flowsheet Documentation  Taken 9/22/2023 1200 by Ericka Green LPN  Pain Management Interventions:   diversional activity provided   care clustered  Taken 9/22/2023 0800 by Ericka Green LPN  Pain Management Interventions:   care clustered   diversional activity provided   pillow support provided   position adjusted  Intervention: Provide Person-Centered Care  Recent Flowsheet Documentation  Taken 9/22/2023 0800 by Ericka Green LPN  Trust Relationship/Rapport:   care explained   choices provided   thoughts/feelings acknowledged  Goal: Readiness for Transition of Care  Outcome: Ongoing, Progressing     Problem: Fall  Injury Risk  Goal: Absence of Fall and Fall-Related Injury  Outcome: Ongoing, Progressing  Intervention: Identify and Manage Contributors  Recent Flowsheet Documentation  Taken 9/22/2023 1400 by Ericka Green LPN  Medication Review/Management: medications reviewed  Taken 9/22/2023 1000 by Ericka Green LPN  Medication Review/Management: medications reviewed  Taken 9/22/2023 0800 by Ericka Green LPN  Medication Review/Management: medications reviewed  Intervention: Promote Injury-Free Environment  Recent Flowsheet Documentation  Taken 9/22/2023 1400 by Ericka Green LPN  Safety Promotion/Fall Prevention:   activity supervised   assistive device/personal items within reach   clutter free environment maintained   lighting adjusted   room organization consistent   safety round/check completed  Taken 9/22/2023 1200 by Ericka Green LPN  Safety Promotion/Fall Prevention:   activity supervised   assistive device/personal items within reach   clutter free environment maintained   lighting adjusted   room organization consistent   safety round/check completed  Taken 9/22/2023 1000 by Ericka Green LPN  Safety Promotion/Fall Prevention:   activity supervised   assistive device/personal items within reach   clutter free environment maintained   lighting adjusted   room organization consistent   safety round/check completed  Taken 9/22/2023 0800 by Ericka Green LPN  Safety Promotion/Fall Prevention:   activity supervised   assistive device/personal items within reach   clutter free environment maintained   lighting adjusted   room organization consistent   safety round/check completed     Problem: Skin Injury Risk Increased  Goal: Skin Health and Integrity  Outcome: Ongoing, Progressing  Intervention: Optimize Skin Protection  Recent Flowsheet Documentation  Taken 9/22/2023 0800 by Ericka Green LPN  Pressure Reduction Techniques:   frequent weight shift encouraged   weight shift assistance  provided  Pressure Reduction Devices: pressure-redistributing mattress utilized  Skin Protection:   adhesive use limited   incontinence pads utilized   tubing/devices free from skin contact     Problem: Malnutrition  Goal: Improved Nutritional Intake  Outcome: Ongoing, Progressing     Problem: Adjustment to Illness (Sepsis/Septic Shock)  Goal: Optimal Coping  Outcome: Ongoing, Progressing  Intervention: Optimize Psychosocial Adjustment to Illness  Recent Flowsheet Documentation  Taken 9/22/2023 0800 by Ericka Green LPN  Family/Support System Care: support provided     Problem: Bleeding (Sepsis/Septic Shock)  Goal: Absence of Bleeding  Outcome: Ongoing, Progressing     Problem: Glycemic Control Impaired (Sepsis/Septic Shock)  Goal: Blood Glucose Level Within Desired Range  Outcome: Ongoing, Progressing  Intervention: Optimize Glycemic Control  Recent Flowsheet Documentation  Taken 9/22/2023 0800 by Ericka Green LPN  Glycemic Management: blood glucose monitored     Problem: Infection Progression (Sepsis/Septic Shock)  Goal: Absence of Infection Signs and Symptoms  Outcome: Ongoing, Progressing  Intervention: Initiate Sepsis Management  Recent Flowsheet Documentation  Taken 9/22/2023 1400 by Ericka Green LPN  Infection Prevention:   cohorting utilized   environmental surveillance performed   hand hygiene promoted   personal protective equipment utilized   rest/sleep promoted   single patient room provided   visitors restricted/screened  Isolation Precautions: precautions maintained  Taken 9/22/2023 1200 by Ericka Green LPN  Infection Prevention:   cohorting utilized   environmental surveillance performed   hand hygiene promoted   personal protective equipment utilized   rest/sleep promoted   single patient room provided   visitors restricted/screened  Isolation Precautions: precautions maintained  Taken 9/22/2023 1000 by Ericka Green LPN  Infection Prevention:   cohorting utilized   environmental  surveillance performed   hand hygiene promoted   personal protective equipment utilized   rest/sleep promoted   single patient room provided   visitors restricted/screened  Isolation Precautions: precautions maintained  Taken 9/22/2023 0800 by Ericka Green LPN  Infection Prevention:   cohorting utilized   visitors restricted/screened   single patient room provided   rest/sleep promoted   personal protective equipment utilized   hand hygiene promoted   environmental surveillance performed  Isolation Precautions: precautions maintained     Problem: Nutrition Impaired (Sepsis/Septic Shock)  Goal: Optimal Nutrition Intake  Outcome: Ongoing, Progressing   Goal Outcome Evaluation:       Family has made the decision this shift to change code status. Patient has not responded to staff this shift. Patient has O2 in place per nasal cannula. Patient has Amidarone drip continues at 2.5 mg/min per order from Cardio. Cardizem continues to be titrated per order. Call light within reach.

## 2023-09-22 NOTE — PROGRESS NOTES
LOS: 4 days   Patient Care Team:  Tip Parikh MD as PCP - General (Family Medicine)  Viviane Chen MD as Consulting Physician (Nephrology)      Subjective unable to assess secondary to mental status    Interval History:     Subjective: Nurse reports increased shortness of breath and cough.  He was transferred to [facility] and is now on amiodarone/Cardizem drip.  Nurse reports no bowel movement overnight but was having dark stool yesterday.    ROS:   Unable to assess secondary to mental status     Medication Review:     Current Facility-Administered Medications:     [COMPLETED] amiodarone 150 mg in 100 mL D5W (loading dose), 150 mg, Intravenous, Once, 150 mg at 23 **FOLLOWED BY** [] amiodarone 360 mg in 200 mL D5W infusion, 1 mg/min, Intravenous, Continuous, Last Rate: 33.3 mL/hr at 23, 1 mg/min at 23 **FOLLOWED BY** amiodarone 360 mg in 200 mL D5W infusion, 0.5 mg/min, Intravenous, Continuous, Eris Veliz MD, Last Rate: 16.67 mL/hr at 23 0429, 0.5 mg/min at 23 0429    carbidopa-levodopa (SINEMET)  MG per tablet 2 tablet, 2 tablet, Oral, TID, Jessica Roach MD    cefTRIAXone (ROCEPHIN) 1,000 mg in sodium chloride 0.9 % 100 mL IVPB, 1,000 mg, Intravenous, Q24H, Jessica Roach MD, Last Rate: 200 mL/hr at 23 1207, 1,000 mg at 23 1207    dextrose (D50W) (25 g/50 mL) IV injection 25 g, 25 g, Intravenous, Q15 Min PRN, Viviane Chen MD    dextrose (GLUTOSE) oral gel 15 g, 15 g, Oral, Q15 Min PRN, Viviane Chen MD    dextrose 5 % 990 mL with potassium chloride 20 mEq infusion, , Intravenous, Continuous, Viviane Chen MD, Stopped at 23    dilTIAZem (CARDIZEM) 125 mg in 125 mL D5W infusion, 5-15 mg/hr, Intravenous, Titrated, Jessica Roach MD, Last Rate: 12.5 mL/hr at 23, 12.5 mg/hr at 23    donepezil (ARICEPT) tablet 10 mg, 10 mg, Oral, Daily, Marley,  Jessica DURAN MD    furosemide (LASIX) injection 40 mg, 40 mg, Intravenous, Daily, Jessica Roach MD, 40 mg at 09/22/23 0948    glucagon (GLUCAGEN) injection 1 mg, 1 mg, Intramuscular, Q15 Min PRN, Viviane Chen MD    guaiFENesin-codeine (GUAIFENESIN AC) 100-10 MG/5ML liquid 10 mL, 10 mL, Oral, Q6H, Jessica Roach MD, 10 mL at 09/21/23 0528    HYDROmorphone (DILAUDID) injection 0.5 mg, 0.5 mg, Intravenous, Q4H PRN, Ray Hitchcock MD, 0.5 mg at 09/18/23 1045    insulin lispro (HUMALOG/ADMELOG) injection 2-7 Units, 2-7 Units, Subcutaneous, Q6H, Viviane Chen MD, 3 Units at 09/22/23 0617    ipratropium-albuterol (DUO-NEB) nebulizer solution 3 mL, 3 mL, Nebulization, 4x Daily - RT, Jessica Roach MD, 3 mL at 09/21/23 1540    memantine (NAMENDA) tablet 10 mg, 10 mg, Oral, Nightly, Jessica Roach MD    metroNIDAZOLE (FLAGYL) IVPB 500 mg, 500 mg, Intravenous, Q8H, Jessica Roach MD, Last Rate: 100 mL/hr at 09/22/23 0617, 500 mg at 09/22/23 0617    ondansetron (ZOFRAN) injection 4 mg, 4 mg, Intravenous, Q6H PRN, Ray Hitchcock MD    ondansetron (ZOFRAN) tablet 4 mg, 4 mg, Oral, Q6H PRN **OR** ondansetron (ZOFRAN) injection 4 mg, 4 mg, Intravenous, Q6H PRN, Ray Hitchcock MD    pantoprazole (PROTONIX) injection 40 mg, 40 mg, Intravenous, Q12H, Ray Hitchcock MD, 40 mg at 09/22/23 0948    phenylephrine (DEENA-SYNEPHRINE) 50 mg in sodium chloride 0.9 % 250 mL infusion, 0.5-3 mcg/kg/min, Intravenous, Continuous PRN, Ray Hitchcock MD    Potassium Replacement - Follow Nurse / BPA Driven Protocol, , Does not apply, Coretta VARGAS Jonathan, MD      Objective ill-appearing, nurse and family in room    Vital Signs  Vitals:    09/22/23 0715 09/22/23 0730 09/22/23 0745 09/22/23 0800   BP: 146/72 147/76 158/70 130/72   BP Location:    Left arm   Patient Position:    Lying   Pulse: 72 73 73 69   Resp:    16   Temp:    98.9 °F (37.2 °C)   TempSrc:    Axillary   SpO2: 93% 93% 92% 92%    Weight:       Height:           Physical Exam:     General Appearance:  Allergic but arousable, ill-appearing   Head:    Normocephalic, without obvious abnormality   Eyes:          Conjunctivae normal, anicteric sclera   Throat:   No oral lesions, no thrush, oral mucosa moist   Neck:   supple, no JVD  Scattered bruising to chest wall   Lungs:   Dyspnea at rest, 3 L nasal cannula   Abdomen:     Soft, minimal epigastric tenderness without rebound or guarding, nondistended   Rectal:     Deferred   Extremities:   no cyanosis   Skin:   no jaundice, flushed        Results Review:    CBC    Results from last 7 days   Lab Units 09/22/23  0243 09/21/23  0954 09/20/23  0031 09/19/23  0234 09/18/23  0717   WBC 10*3/mm3 8.20 11.20* 10.20 10.60 2.80*   HEMOGLOBIN g/dL 8.7* 10.0* 10.4* 10.9* 12.6*   PLATELETS 10*3/mm3 69* 68* 76* 108* 150     CMP   Results from last 7 days   Lab Units 09/22/23  0243 09/21/23  0055 09/20/23  0031 09/19/23  0234 09/18/23  0717   SODIUM mmol/L 155* 149* 143 145 140   POTASSIUM mmol/L 3.5 3.9 4.4 4.7 3.7   CHLORIDE mmol/L 120* 119* 111* 110* 101   CO2 mmol/L 20.0* 18.0* 19.0* 19.0* 19.0*   BUN mg/dL 72* 85* 75* 61* 47*   CREATININE mg/dL 2.09* 2.75* 2.66* 2.09* 1.26   GLUCOSE mg/dL 237* 159* 127* 165* 229*   ALBUMIN g/dL 3.3* 2.4* 2.3* 2.6* 3.5   BILIRUBIN mg/dL 0.3 0.4 0.6 0.7 0.9   ALK PHOS U/L 70 65 55 46 86   AST (SGOT) U/L 24 48* 95* 177* 172*   ALT (SGPT) U/L 62* 77* 102* 125* 87*   MAGNESIUM mg/dL 2.7*  --   --   --   --    PHOSPHORUS mg/dL 3.3  --   --   --   --    LIPASE U/L  --   --  25 106* 474*     Cr Clearance Estimated Creatinine Clearance: 25.6 mL/min (A) (by C-G formula based on SCr of 2.09 mg/dL (H)).  Coag   Results from last 7 days   Lab Units 09/21/23  0055 09/18/23  0717   INR  1.41* 1.05     HbA1C No results found for: HGBA1C  Blood Glucose   Glucose   Date/Time Value Ref Range Status   09/22/2023 0555 213 (H) 70 - 105 mg/dL Final     Comment:     Serial Number:  [id number]:  [id number]   09/21/2023 2351 210 (H) 70 - 105 mg/dL Final     Comment:     Serial Number: [id number]:  [id number]   09/21/2023 1634 129 (H) 70 - 105 mg/dL Final     Comment:     Serial Number: [id number]:  [id number]     Infection   Results from last 7 days   Lab Units 09/18/23  0557   URINECX  >100,000 CFU/mL Escherichia coli*     UA    Results from last 7 days   Lab Units 09/18/23  0557   NITRITE UA  Positive*   WBC UA /HPF Too Numerous to Count*   BACTERIA UA /HPF 4+*   SQUAM EPITHEL UA /HPF 0-2   URINECX  >100,000 CFU/mL Escherichia coli*     Radiology(recent) XR Chest 1 View    Result Date: 9/21/2023  Impression: Patchy bilateral airspace opacities, which could reflect pulmonary edema or pneumonia. Electronically Signed: Sourav Roberto MD  9/21/2023 8:47 AM EDT  Workstation ID: [id number]    US Renal Bilateral    Result Date: 9/21/2023  Impression: 1. No evidence of urinary tract obstruction. 2. There are 2 simple cyst within the left kidney, unchanged from prior MRI. Electronically Signed: Nato Flores MD  9/21/2023 4:30 PM EDT  Workstation ID: [id number]        Assessment & Plan   Acute epigastric pain with nausea and vomiting-resolved  Choledocholithiasis s/p ERCP with bile duct clearance  Elevated LFTs-improving  Elevated lipase-resolved  Macrocytic anemia   A-fib with RVR  Renal failure  ?  Pneumonia  UTI -E. coli  Parkinson's disease/dementia  History of CAD/stent     PLAN:  Patient is an 88-year-old male with Parkinson's dementia and CAD/stent who presents from the nursing facility with acute onset epigastric pain with nausea and vomiting.  CT at [facility] facility shows cholelithiasis with distended gallbladder and mild biliary dilation.  Mildly elevated AST and ALT with lipase 4400 at [facility] hospital.      ERCP/EGD 9/18/2023 with sphincterotomy/sphincteroplasty and CBD stone extraction.  Medium hiatal hernia and dark-colored liquid as well as food filled most of the  stomach.  There was also suspicion of aspiration at the end of the procedure.      Still having dark stool which with drop in hemoglobin this morning but not a candidate for repeat endoscopy at this time with cardiac and pulmonary status.  Continue pantoprazole twice daily.  Speech therapy has evaluated and he is not safe for oral nutrition at this time.    Cardiology following with A-fib with RVR and was transferred to [facility] and is now on Cardizem and amiodarone drip.  Nephrology also following now for renal failure.  Planning hypotonic IV fluids as well as electrolyte replacement.  Further work-up in progress.    Discussed with bedside RN this morning in rounds.  May need to have a discussion with family about goals of care as patient has not been improving.  Continue supportive care we will follow.    Electronically signed by LUI Cooper, 09/22/23, 10:49 AM EDT.

## 2023-09-22 NOTE — PROGRESS NOTES
"NEPHROLOGY PROGRESS NOTE------[facility]    [facility]  [phone]  Megha Chen MD      Patient Care Team:  Tip Parikh MD as PCP - General (Family Medicine)  April, MD Viviane as Consulting Physician (Nephrology)      Provider:  Megha Chen MD  Patient Name: Magnus Talbot  :  1934    SUBJECTIVE:    F/U ARF/MILENA/CRF/CKD    Eyes open and family in room. Spoke with patient's wife at length. They are considering hospice/palliative care.     Medication:  carbidopa-levodopa, 2 tablet, Oral, TID  cefTRIAXone, 1,000 mg, Intravenous, Q24H  donepezil, 10 mg, Oral, Daily  furosemide, 40 mg, Intravenous, Daily  guaiFENesin-codeine, 10 mL, Oral, Q6H  insulin lispro, 2-7 Units, Subcutaneous, Q6H  ipratropium-albuterol, 3 mL, Nebulization, 4x Daily - RT  memantine, 10 mg, Oral, Nightly  metroNIDAZOLE, 500 mg, Intravenous, Q8H  pantoprazole, 40 mg, Intravenous, Q12H      amiodarone, 0.5 mg/min, Last Rate: 0.5 mg/min (23 0429)  dilTIAZem, 5-15 mg/hr, Last Rate: 12.5 mg/hr (23 0617)  phenylephrine, 0.5-3 mcg/kg/min        OBJECTIVE    Vital Sign Min/Max for last 24 hours  Temp  Min: 98.2 °F (36.8 °C)  Max: 98.9 °F (37.2 °C)   BP  Min: 137/70  Max: 167/70   Pulse  Min: 69  Max: 134   Resp  Min: 16  Max: 20   SpO2  Min: 0 %  Max: 98 %   No data recorded   No data recorded     Flowsheet Rows      Flowsheet Row First Filed Value   Admission Height 167.6 cm (66\") Documented at 2023 0332   Admission Weight 72.8 kg (160 lb 7.9 oz) Documented at 2023 0332            No intake/output data recorded.  I/O last 3 completed shifts:  In: 2200 [I.V.:; IV Piggyback:200]  Out: 300 [Urine:300]    Physical Exam: ON AMIODARONE AND DILTIAZEM GTT  General Appearance: alert, appears stated age and cooperative  Head: normocephalic, without obvious abnormality and atraumatic +DRY OP WITH POOR DENTITION  Eyes: conjunctivae " and sclerae normal and no icterus  Neck: supple and no JVD  Lungs: +BIBASILAR RALES AND SCATTERED RHONCHI  Heart: IRREG IRREG +SARAH  Chest Wall: no abnormalities observed  Abdomen: normal bowel sounds and soft, nontender  Extremities: moves extremities well, no edema, no cyanosis +DJD  Skin: +NUMEROUS SCATTERED ECCHYMOSIS AND DECREASED SKIN TURGOR  Neurologic: Alert but NOT ORIENTED. No focal deficits    Labs:    WBC WBC   Date Value Ref Range Status   09/22/2023 8.20 3.40 - 10.80 10*3/mm3 Final   09/21/2023 11.20 (H) 3.40 - 10.80 10*3/mm3 Final   09/20/2023 10.20 3.40 - 10.80 10*3/mm3 Final      HGB Hemoglobin   Date Value Ref Range Status   09/22/2023 8.7 (L) 13.0 - 17.7 g/dL Final   09/21/2023 10.0 (L) 13.0 - 17.7 g/dL Final   09/20/2023 10.4 (L) 13.0 - 17.7 g/dL Final      HCT Hematocrit   Date Value Ref Range Status   09/22/2023 26.2 (L) 37.5 - 51.0 % Final   09/21/2023 30.8 (L) 37.5 - 51.0 % Final   09/20/2023 31.4 (L) 37.5 - 51.0 % Final      Platelets No results found for: LABPLAT   MCV MCV   Date Value Ref Range Status   09/22/2023 102.1 (H) 79.0 - 97.0 fL Final   09/21/2023 103.5 (H) 79.0 - 97.0 fL Final   09/20/2023 104.1 (H) 79.0 - 97.0 fL Final          Sodium Sodium   Date Value Ref Range Status   09/22/2023 155 (H) 136 - 145 mmol/L Final   09/21/2023 149 (H) 136 - 145 mmol/L Final   09/20/2023 143 136 - 145 mmol/L Final      Potassium Potassium   Date Value Ref Range Status   09/22/2023 3.5 3.5 - 5.2 mmol/L Final   09/21/2023 3.9 3.5 - 5.2 mmol/L Final     Comment:     Slight hemolysis detected by analyzer. Results may be affected.   09/20/2023 4.4 3.5 - 5.2 mmol/L Final      Chloride Chloride   Date Value Ref Range Status   09/22/2023 120 (H) 98 - 107 mmol/L Final   09/21/2023 119 (H) 98 - 107 mmol/L Final   09/20/2023 111 (H) 98 - 107 mmol/L Final      CO2 CO2   Date Value Ref Range Status   09/22/2023 20.0 (L) 22.0 - 29.0 mmol/L Final   09/21/2023 18.0 (L) 22.0 - 29.0 mmol/L Final   09/20/2023 19.0  (L) 22.0 - 29.0 mmol/L Final      BUN BUN   Date Value Ref Range Status   09/22/2023 72 (H) 8 - 23 mg/dL Final   09/21/2023 85 (H) 8 - 23 mg/dL Final   09/20/2023 75 (H) 8 - 23 mg/dL Final      Creatinine Creatinine   Date Value Ref Range Status   09/22/2023 2.09 (H) 0.76 - 1.27 mg/dL Final   09/21/2023 2.75 (H) 0.76 - 1.27 mg/dL Final   09/20/2023 2.66 (H) 0.76 - 1.27 mg/dL Final      Calcium Calcium   Date Value Ref Range Status   09/22/2023 9.0 8.6 - 10.5 mg/dL Final   09/21/2023 7.7 (L) 8.6 - 10.5 mg/dL Final   09/20/2023 7.4 (L) 8.6 - 10.5 mg/dL Final      PO4 No components found for: PO4   Albumin Albumin   Date Value Ref Range Status   09/22/2023 3.3 (L) 3.5 - 5.2 g/dL Final   09/21/2023 2.4 (L) 3.5 - 5.2 g/dL Final   09/20/2023 2.3 (L) 3.5 - 5.2 g/dL Final      Magnesium Magnesium   Date Value Ref Range Status   09/22/2023 2.7 (H) 1.6 - 2.4 mg/dL Final      Uric Acid No components found for: URIC ACID     Imaging Results (Last 72 Hours)       Procedure Component Value Units Date/Time    US Renal Bilateral [[id number]] Collected: 09/21/23 1626     Updated: 09/21/23 1632    Narrative:      US RENAL BILATERAL    Date of Exam: 9/21/2023 3:08 PM EDT    Indication: ARF/MILENA/CRF/CKD.    Comparison: MRI abdomen 9/18/2023    Technique: Grayscale and color Doppler ultrasound evaluation of the kidneys and urinary bladder was performed.      Findings:  Right kidney measures 11.2 cm and the left kidney measures 11.2 cm in [id number] length. There is normal thickness and normal echogenicity of the renal parenchyma bilaterally. There is no evidence of hydronephrosis as demonstrated on prior MRI. There   is an anechoic 4.4 cm cyst of the lateral cortex of the mid left kidney. This appears unchanged from prior MRI as well. There is an additional 3.2 cm cyst of the upper pole of the left kidney also unchanged from prior MRI. There is color kidneys.    Limited imaging unremarkable.      Impression:      Impression:    1. No  evidence of urinary tract obstruction.  2. There are 2 simple cyst within the left kidney, unchanged from prior MRI.        Electronically Signed: Nato Flores MD    9/21/2023 4:30 PM EDT    Workstation ID: [id number]    XR Chest 1 View [[id number]] Collected: 09/21/23 0843     Updated: 09/21/23 0849    Narrative:      XR CHEST 1 VW    Date of Exam: 9/21/2023 8:16 AM EDT    Indication: SOB    Comparison: September 19, 2023    Findings:  There is a calcified granuloma in the right lower lung. There are patchy bilateral airspace opacities. The heart and mediastinal contours appear normal. The osseous structures appear intact.      Impression:      Impression:  Patchy bilateral airspace opacities, which could reflect pulmonary edema or pneumonia.      Electronically Signed: Sourav Roberto MD    9/21/2023 8:47 AM EDT    Workstation ID: [id number]    CT Outside Films [[id number]] Resulted: 09/19/23 1553     Updated: 09/19/23 1553    Narrative:      This procedure was auto-finalized with no dictation required.    XR Chest 2 View [[id number]] Collected: 09/19/23 1534     Updated: 09/19/23 1537    Narrative:      XR CHEST 2 VW    Date of Exam: 9/19/2023 3:00 PM EDT    Indication: possible aspiration, requires more O2    Comparison: 9/18/2023    Findings:  Heart size is within normal limits. Pulmonary vessels are normal. Lung volumes are low. No focal airspace consolidation. No pleural effusion or pneumothorax. Calcified granuloma seen within the right lower lobe.      Impression:      Impression:    1. Low volume inspiration. No acute cardiopulmonary disease.      Electronically Signed: Nato Flores MD    9/19/2023 3:35 PM EDT    Workstation ID: [id number]            Results for orders placed during the hospital encounter of 09/18/23    XR Chest 1 View    Narrative  XR CHEST 1 VW    Date of Exam: 9/21/2023 8:16 AM EDT    Indication: SOB    Comparison: September 19, 2023    Findings:  There is a calcified granuloma in the right  lower lung. There are patchy bilateral airspace opacities. The heart and mediastinal contours appear normal. The osseous structures appear intact.    Impression  Impression:  Patchy bilateral airspace opacities, which could reflect pulmonary edema or pneumonia.      Electronically Signed: Sourav Roberto MD  9/21/2023 8:47 AM EDT  Workstation ID: [id number]      XR Chest 2 View    Narrative  XR CHEST 2 VW    Date of Exam: 9/19/2023 3:00 PM EDT    Indication: possible aspiration, requires more O2    Comparison: 9/18/2023    Findings:  Heart size is within normal limits. Pulmonary vessels are normal. Lung volumes are low. No focal airspace consolidation. No pleural effusion or pneumothorax. Calcified granuloma seen within the right lower lobe.    Impression  Impression:    1. Low volume inspiration. No acute cardiopulmonary disease.      Electronically Signed: Nato Flores MD  9/19/2023 3:35 PM EDT  Workstation ID: [id number]      XR Skull Lateral & Ap    Narrative  XR SKULL LATERAL AND AP    Date of Exam: 9/18/2023 11:44 AM EDT    Indication: needed prior to MRI    Comparison: None available.    Technique: 2 views of the skull were obtained.    Findings:  No unexpected retained radiopaque foreign body is seen within the skull or imaged neck. An artifact of a button, external to the patient, projects over the lower neck soft tissues on the lateral view. No suspicious osseous abnormalities. The patient is  edentulous. There degenerative changes of the cervical spine, most notable at C5-6    Impression  Impression: No unexpected retained opaque foreign body is seen in the skull.    Electronically Signed: Kaur Ward MD  9/18/2023 11:48 AM EDT  Workstation ID: [id number]            ASSESSMENT / PLAN      Cholelithiasis    Pancreatitis    Leukocytosis    Renal insufficiency    Parkinson's disease dementia    Hypothyroidism (acquired)    Hyperlipidemia    Hypokalemia    Essential hypertension    History of coronary artery  disease    Choledocholithiasis    Moderate malnutrition      ARF/MILENA/CRF/CKD-------Nonoliguric. +ARF/MILENA on top of what appears to be  CRF/CKD STG 3A with a baseline serum creatinine of about 1.26. Unknown if patient has baseline proteinuria or hematuria. CRF/CKD STG 3A likely secondary to HTN NS. +ARF/MILENA appears to be multifactorial and secondary to prerenal state/intravascular volume depletion with concomitant HCTZ use and from  ATN from hypotension and concomitant Napraxen use. D/C Naproxen and no NSAIDs. D/C HCTZ. Hydrate with IV D5W. Avoid hypotension.  No IV dye. Dose meds for CrCl less than 10 cc/min. Counseled patient and his wife on disease state. FOR NOW, WIFE DOES NOT WANT ANY TYPE OF DIALYSIS OR RENAL REPLACEMENT THERAPY AS SHE IS WAITING ON THE SON AND MORE LIKELY GOING TOWARDS HOSPICE/PALLIATIVE CARE ROUTE, WHICH I AGREE WITH     2. ANEMIA-------H/H stable. IV iron for LEONIDES     3. HYPERNATREMIA------Free water deficit of about 5 liters. Hypotonic IVFs and d/c HCTZ and no more Lasix and follow     4. ACIDOSIS-------Better. Metabolic. No elevation in AGAP. +Type 4 RTA. Given IV NaHCO3 and follow lactic     5. HYPOCALCEMIA-------Replaced     6. HYPOALBUMINEMIA------S/P IV Albumin to temporize.      7. ELEVATED LFTS/TRANSAMINITIS/CHOLEDOCHOLITHIASIS-------per GI and General Surgery. S/P CBD stent     8. OA/DJD-------No NSAIDs.  Uric ok     9. DEMENTIA/PARKINSON'S     10. GLYCEMIC CONTROL--------Glucometers, SSI     11. E. COLI UTI-------On  Rocephin     12. DVT PROPHYLAXIS-----SCDs     13. GERD/PUD PROPHYLAXIS------PPI. Benefits outweigh risks despite renal dysfunction     14. LIKELY PNA    15. HYPOKALEMIA-------Replace in maintenance IVFs      Megha Chen MD  [facility]  [phone]  09/22/23  07:37 EDT

## 2023-09-22 NOTE — PROGRESS NOTES
[facility] Medicine Services   Daily Progress Note      Patient Name: Magnus Talbot  : 1934  MRN: [id number]  Primary Care Physician:  Tip Parikh MD  Date of admission: 2023      Subjective      Chief Complaint: Abdominal pain    Patient seen and examined this morning.  Taking over care today, patient is ill-appearing and lethargic.  Does not follow commands, responds to some verbal stimuli.  Wife at bedside and had extensive discussion with her regarding patient's overall prognosis and current diagnosis.  She wants him to be comfortable and wants to possibly stop aggressive therapy but she wants to discuss it with patient's son first.  She does agree with him being DNR/DNI for now.  Nurse present at bedside during the conversation.    Unable to obtain full review of systems due to patient's underlying mental status.      Objective      Vitals:   Temp:  [98.2 °F (36.8 °C)-98.9 °F (37.2 °C)] 98.9 °F (37.2 °C)  Heart Rate:  [] 69  Resp:  [16-20] 16  BP: (130-167)/() 130/72  Flow (L/min):  [2-2.5] 2    Physical Exam:    General: Lethargic and ill-appearing, does not follow commands, lying in bed  Eyes: PERRL, EOMI, conjunctivae are clear  Cardiovascular: Regular rate and rhythm, no murmurs  Respiratory: Decreased breath sounds bilaterally, no wheezing or rales, unlabored breathing  Abdomen: Soft, generalized tenderness noted, positive bowel sounds, no guarding  Neurologic: Lethargic, does not follow commands, responds to some verbal stimuli, bilateral hand tremors noted, moves all extremities spontaneously  Musculoskeletal: Generalized weakness, no other gross deformities  Skin: Warm, dry         Result Review    Result Review:  I have personally reviewed the results from the time of this admission to 2023 10:48 EDT and agree with these findings:  [x]  Laboratory  [x]  Microbiology  [x]  Radiology  [x]  EKG/Telemetry   [x]  Cardiology/Vascular   []  Pathology  [x]  Old  records  []  Other:          Assessment & Plan      Brief Patient Summary:  Magnus Talbot is a 88 y.o. male who       carbidopa-levodopa, 2 tablet, Oral, TID  cefTRIAXone, 1,000 mg, Intravenous, Q24H  donepezil, 10 mg, Oral, Daily  furosemide, 40 mg, Intravenous, Daily  guaiFENesin-codeine, 10 mL, Oral, Q6H  insulin lispro, 2-7 Units, Subcutaneous, Q6H  ipratropium-albuterol, 3 mL, Nebulization, 4x Daily - RT  memantine, 10 mg, Oral, Nightly  metroNIDAZOLE, 500 mg, Intravenous, Q8H  pantoprazole, 40 mg, Intravenous, Q12H       amiodarone, 0.5 mg/min, Last Rate: 0.5 mg/min (09/22/23 0429)  custom IV infusion builder, , Last Rate: Stopped (09/22/23 0952)  dilTIAZem, 5-15 mg/hr, Last Rate: 12.5 mg/hr (09/22/23 0617)  phenylephrine, 0.5-3 mcg/kg/min         I have utilized all available, immediate resources to obtain, update, or review the patient's current medications including all prescriptions, over-the-counter products, herbals, cannabis/cannabidiol products, and vitamin.mineral/dietary (nutritional) supplements.    Active Hospital Problems:  Active Hospital Problems    Diagnosis     **Cholelithiasis     Moderate malnutrition     Pancreatitis     Leukocytosis     Renal insufficiency     Parkinson's disease dementia     Hypothyroidism (acquired)     Hyperlipidemia     Hypokalemia     Essential hypertension     History of coronary artery disease     Choledocholithiasis      Plan:     Choledocholithiasis with biliary pancreatitis  -s/p ERCP with stone extraction and stent placement on 9/18  -GI also notes possible aspiration event during the procedure  -Patient has been declining in condition and remains confused, unable to tolerate any p.o.  -On empiric antibiotics with IV Rocephin and Flagyl for now  -Further discussion with patient's wife at bedside, she is leaning more towards comfort care will make decision after talking to patient's son    Acute respiratory failure with hypoxia  Likely aspiration  pneumonia  -Patient respiratory status appears to be worsening as patient is becoming more lethargic and confused  -Wife has made him DNR/DNI for now  -On empiric antibiotics as above for possible aspiration pneumonia  -Guarded prognosis, awaiting final decision from family regarding possible hospice    MILENA on CKD IIIA  Hypernatremia  -Creatinine and sodium worsening  -Fluid management per nephrology  -Possible comfort care as above    New onset A-fib with RVR  -Remains on Cardizem and amiodarone drip per cardiology  -Patient is not a candidate for long-term anticoagulation due to underlying morbidities  -Possible comfort care as above  -Cardiology following    Acute metabolic encephalopathy  Dysphagia  -All secondary to above   -Management as noted above    Parkinson's disease with dementia  -Mental status worsening as above  -Supportive care    DVT prophylaxis  -SCDs for now    CODE STATUS:    Medical Intervention Limits: NO intubation (DNI)  Level Of Support Discussed With: Health Care Surrogate; Next of Kin (If No Surrogate)  Code Status (Patient has no pulse and is not breathing): No CPR (Do Not Attempt to Resuscitate)  Medical Interventions (Patient has pulse or is breathing): Limited Support      Disposition: Guarded prognosis.  Recommend hospice/comfort care, awaiting final decision from family.    Electronically signed by Kaden Lugo DO, 09/22/23, 10:48 EDT.  [facility] Hospitalist Team      Part of this note may be an electronic transcription/translation of spoken language to printed text using the Dragon Dictation System.

## 2023-09-22 NOTE — THERAPY DISCHARGE NOTE
Patient Name: Magnus Talbot  : 1934    MRN: [id number]                              Today's Date: 2023       Admit Date: 2023    Visit Dx:     ICD-10-CM ICD-9-CM   1. Choledocholithiasis  K80.50 574.50   2. Acute biliary pancreatitis, unspecified complication status  K85.10 577.0     Patient Active Problem List   Diagnosis    Pancreatitis    Cholelithiasis    Leukocytosis    Renal insufficiency    Parkinson's disease dementia    Hypothyroidism (acquired)    Hyperlipidemia    Hypokalemia    Essential hypertension    History of coronary artery disease    Choledocholithiasis    Moderate malnutrition     Past Medical History:   Diagnosis Date    CAD (coronary artery disease)     Dementia     Parkinsons     Thyroid disease      Past Surgical History:   Procedure Laterality Date    ERCP N/A 2023    Procedure: ENDOSCOPIC RETROGRADE CHOLANGIOPANCREATOGRAPHY, SPHINTEROTOMY, SPHINTEROPLASTY, CLEARANCE OF BILE DUCT WITH BALLOON (12MM-15MM, UP TO 15MM) EXTRACTION OF BILIARY STONES WITH BALLOON , OCCLUSION CHOLANGIOGRAM,  PLACEMENT OF BILIARY STENT, ESOPHAGOGASTRODUODNESCOPY;  Surgeon: Ray Hitchcock MD;  Location: [facility] ENDOSCOPY;  Service: Gastroenterology;  Laterality: N/A;  POST: CHOLEDOCHOLE    LEFT HEART CATH        General Information    No documentation.                  Mobility    No documentation.                  Obj/Interventions    No documentation.                  Goals/Plan       Row Name 23 1005          Bed Mobility Goal 1 (PT)    Activity/Assistive Device (Bed Mobility Goal 1, PT) bed mobility activities, all  -TH     Progress/Outcomes (Bed Mobility Goal 1, PT) medical status inhibited participation  -TH       Row Name 23 1005          Transfer Goal 1 (PT)    Activity/Assistive Device (Transfer Goal 1, PT) transfers, all  -TH     Progress/Outcome (Transfer Goal 1, PT) medical status inhibited participation  -TH       Row Name 23 1005          Gait Training Goal 1  (PT)    Progress/Outcome (Gait Training Goal 1, PT) medical status inhibited participation  -TH               User Key  (r) = Recorded By, (t) = Taken By, (c) = Cosigned By      Initials Name Provider Type     Kim Smiley, PT Physical Therapist                   Clinical Impression    No documentation.                  Outcome Measures    No documentation.                 Physical Therapy Education       Title: PT OT SLP Therapies (Done)       Topic: Physical Therapy (Done)       Point: Mobility training (Done)       Learning Progress Summary             Patient Acceptance, E,TB, VU by MM at 9/21/2023 1533    Acceptance, E,TB, VU by MM at 9/20/2023 1329    Acceptance, E, VU by DB at 9/19/2023 1619   Significant Other Acceptance, E, VU by DB at 9/19/2023 1619                         Point: Home exercise program (Done)       Learning Progress Summary             Patient Acceptance, E,TB, VU by MM at 9/21/2023 1533    Acceptance, E,TB, VU by MM at 9/20/2023 1329                         Point: Body mechanics (Done)       Learning Progress Summary             Patient Acceptance, E,TB, VU by MM at 9/21/2023 1533    Acceptance, E,TB, VU by MM at 9/20/2023 1329                         Point: Precautions (Done)       Learning Progress Summary             Patient Acceptance, E,TB, VU by MM at 9/21/2023 1533    Acceptance, E,TB, VU by MM at 9/20/2023 1329                                         User Key       Initials Effective Dates Name Provider Type Discipline     07/07/23 -  Jessica Jarquin, RN Registered Nurse Nurse     08/16/23 -  Nathan Martinez, SCARLETT Student PT Student PT                  PT Recommendation and Plan           Time Calculation:              PT G-Codes  Outcome Measure Options: AM-PAC 6 Clicks Basic Mobility (PT)  AM-PAC 6 Clicks Score (PT): 10    PT Discharge Summary  Reason for Discharge: Change in medical status, Unable to participate  Outcomes Achieved: Unable to make functional progress toward goals  at this time (Nursing informed PT about palliative care for patient and requested discharge from therapy.)    Kim Smiley, PT  9/22/2023

## 2023-09-22 NOTE — CASE MANAGEMENT/SOCIAL WORK
Continued Stay Note  SUNDAY [facility]choay[facility]     Patient Name: Magnus Talbot  MRN: [id number]  Today's Date: 9/22/2023    Admit Date: 9/18/2023    Plan: DC Plan: Return to [facility]. No precert required. No PASRR needed (return to SNF).   Discharge Plan       Row Name 09/22/23 1505       Plan    Plan DC Plan: Return to [facility]. No precert required. No PASRR needed (return to [facility]).    Patient/Family in Agreement with Plan yes    Plan Comments Barriers to discharge: ERCP done 9/18 no plans for cholecystectomy at this time. IV abx, IV lasix.  Amio and cardizem gtts.  watch creat.  Palliative consult for goals of care.             Expected Discharge Date and Time       Expected Discharge Date Expected Discharge Time    Sep 25, 2023         Phone communication or documentation only - no physical contact with patient or family.   Marcela Lombardi RN     Office Phone [phone]  Office Cell [phone]

## 2023-09-22 NOTE — PROGRESS NOTES
Referring Provider: Kaedn Lugo DO    Reason for follow-up:     AF RVR  HTN  MS changes  Pancreatitis  Choledocholihiasis     Patient Care Team:  Tip Parikh MD as PCP - General (Family Medicine)  Viviane Chen MD as Consulting Physician (Nephrology)      SUBJECTIVE    No responsivie to verbal commands      ROS     Review of Systems   Unable to perform ROS: Acuity of condition       Personal History:    Past Medical History:   Diagnosis Date    CAD (coronary artery disease)     Dementia     Parkinsons     Thyroid disease        Past Surgical History:   Procedure Laterality Date    ERCP N/A 9/18/2023    Procedure: ENDOSCOPIC RETROGRADE CHOLANGIOPANCREATOGRAPHY, SPHINTEROTOMY, SPHINTEROPLASTY, CLEARANCE OF BILE DUCT WITH BALLOON (12MM-15MM, UP TO 15MM) EXTRACTION OF BILIARY STONES WITH BALLOON , OCCLUSION CHOLANGIOGRAM,  PLACEMENT OF BILIARY STENT, ESOPHAGOGASTRODUODNESCOPY;  Surgeon: Ray Hitchcock MD;  Location: [facility] ENDOSCOPY;  Service: Gastroenterology;  Laterality: N/A;  POST: CHOLEDOCHOLE    LEFT HEART CATH         History reviewed. No pertinent family history.    Social History     Tobacco Use    Smoking status: Former     Types: Cigarettes   Vaping Use    Vaping Use: Never used   Substance Use Topics    Alcohol use: Yes     Alcohol/week: 7.0 standard drinks     Types: 7 Glasses of wine per week    Drug use: No        Home meds:  Prior to Admission medications    Medication Sig Start Date End Date Taking? Authorizing Provider   acetaminophen (TYLENOL) 500 MG tablet Take 1 tablet by mouth Daily.   Yes Leann Ochoa MD   aspirin 81 MG EC tablet Take 1 tablet by mouth Daily.   Yes Leann Ochoa MD   atenolol (TENORMIN) 25 MG tablet Take 0.5 tablets by mouth Daily.   Yes Leann Ochoa MD   carbidopa-levodopa (SINEMET)  MG per tablet Take 2 tablets by mouth 3 (Three) Times a Day.   Yes Leann Ochoa MD   donepezil (ARICEPT) 10 MG tablet Take 1 tablet  by mouth Daily.   Yes Leann Ochoa MD   doxazosin (CARDURA) 4 MG tablet Take 0.5 tablets by mouth Daily.   Yes Leann Ochoa MD   finasteride (PROSCAR) 5 MG tablet Take 1 tablet by mouth Daily.   Yes Leann Ochoa MD   gabapentin (NEURONTIN) 300 MG capsule Take 1 capsule by mouth 3 (Three) Times a Day.   Yes Leann Ochoa MD   hydroCHLOROthiazide (MICROZIDE) 12.5 MG capsule Take 1 capsule by mouth Daily.   Yes Leann Ochoa MD   melatonin 5 MG tablet tablet Take 1 tablet by mouth Every Night.   Yes Leann Ochoa MD   memantine (NAMENDA) 10 MG tablet Take 1 tablet by mouth Every Night.   Yes Leann Ochoa MD   Naproxen Sodium 220 MG capsule Take 440 mg by mouth Daily.   Yes Leann Ochoa MD   omeprazole (priLOSEC) 20 MG capsule Take 1 capsule by mouth Daily.   Yes Leann Ochoa MD   vitamin B-12 (CYANOCOBALAMIN) 1000 MCG tablet Take 1 tablet by mouth Daily.   Yes Leann Ochoa MD   Ascorbic Acid ([employer] Vitamin C Drops) 60 MG lozenge Dissolve 1 lozenge in the mouth 3 (Three) Times a Day As Needed.    Leann Ochoa MD   Miconazole Nitrate 2 % aerosol Apply 1 application  topically 2 (Two) Times a Day.    Leann Ochoa MD       Allergies:  Patient has no known allergies.    Scheduled Meds:carbidopa-levodopa, 2 tablet, Oral, TID  [START ON 9/23/2023] cefTRIAXone, 2,000 mg, Intravenous, Q24H  donepezil, 10 mg, Oral, Daily  ferric gluconate, 125 mg, Intravenous, Daily  furosemide, 40 mg, Intravenous, Daily  guaiFENesin-codeine, 10 mL, Oral, Q6H  insulin lispro, 2-7 Units, Subcutaneous, Q6H  ipratropium-albuterol, 3 mL, Nebulization, 4x Daily - RT  memantine, 10 mg, Oral, Nightly  metroNIDAZOLE, 500 mg, Intravenous, Q8H  pantoprazole, 40 mg, Intravenous, Q12H      Continuous Infusions:amiodarone, 0.25 mg/min, Last Rate: 0.25 mg/min (09/22/23 7946)  dextrose 5 % with KCl 20 mEq, 125 mL/hr, Last Rate: 125 mL/hr (09/22/23  "1451)  dilTIAZem, 5-15 mg/hr, Last Rate: 5 mg/hr (09/22/23 1748)  phenylephrine, 0.5-3 mcg/kg/min      PRN Meds:.  dextrose    dextrose    glucagon (human recombinant)    HYDROmorphone    ondansetron    ondansetron **OR** ondansetron    phenylephrine    Potassium Replacement - Follow Nurse / BPA Driven Protocol      OBJECTIVE    Vital Signs  Vitals:    09/22/23 1715 09/22/23 1716 09/22/23 1730 09/22/23 1745   BP: 156/66 156/66 156/71 159/76   BP Location:  Left arm     Patient Position:  Lying     Pulse: 75 76 76 77   Resp:  21     Temp:  99.6 °F (37.6 °C)     TempSrc:  Axillary     SpO2: 90% (!) 89% 93% 91%   Weight:       Height:           Flowsheet Rows      Flowsheet Row First Filed Value   Admission Height 167.6 cm (66\") Documented at 09/18/2023 0332   Admission Weight 72.8 kg (160 lb 7.9 oz) Documented at 09/18/2023 0332              Intake/Output Summary (Last 24 hours) at 9/22/2023 1818  Last data filed at 9/22/2023 1621  Gross per 24 hour   Intake 0 ml   Output 2625 ml   Net -2625 ml          Telemetry:  SR    Physical Exam:  The patient idrowsy, minimall responsive  Vital signs as noted above.  Head and neck revealed no carotid bruits or jugular venous distention.  No thyromegaly or lymphadenopathy is present  Lungs coarse, rhonchi present bilaterally.    Heart normal first and second heart sounds.  No murmur. No precordial rub is present.  No gallop is present.  Abdomen soft and nontender.  No organomegaly is present.  Extremities with good peripheral pulses without any pedal edema.  Skin warm and dry.  Musculoskeletal system is grossly normal.  CNS grossly normal.       Results Review:  I have personally reviewed the results from the time of this admission to 9/22/2023 18:18 EDT and agree with these findings:  [x]  Laboratory  []  Microbiology  []  Radiology  [x]  EKG/Telemetry   [x]  Cardiology/Vascular   []  Pathology  []  Old records  []  Other:    Most notable findings include:    Lab Results (last " 24 hours)       Procedure Component Value Units Date/Time    POC Glucose Once [[id number]]  (Abnormal) Collected: 09/22/23 1806    Specimen: Blood Updated: 09/22/23 1808     Glucose 254 mg/dL      Comment: Serial Number: [id number]:  [id number]       Protein Elec + Interp, Serum [[id number]]  (Abnormal) Collected: 09/21/23 1432    Specimen: Blood Updated: 09/22/23 1412     Total Protein 5.4 g/dL      Albumin 2.6 g/dL      Alpha-1-Globulin 0.4 g/dL      Alpha-2-Globulin 0.9 g/dL      Beta Globulin 0.7 g/dL      Gamma Globulin 0.8 g/dL      M-Patric Not Observed g/dL      Globulin 2.8 g/dL      A/G Ratio 0.9     Please note Comment     Comment: Protein electrophoresis scan will follow via computer, mail, or   delivery.        SPE Interpretation Comment:     Comment: SPE shows decreased total protein and albumin.       Narrative:      Performed at:  [address] [place] [address]  [id number]  : Phuc Jarquin PhD, Phone:  [phone]    POC Glucose Once [[id number]]  (Abnormal) Collected: 09/22/23 1236    Specimen: Blood Updated: 09/22/23 1238     Glucose 179 mg/dL      Comment: Serial Number: [id number]:  [id number]       Folate [[id number]]  (Normal) Collected: 09/22/23 0244    Specimen: Blood Updated: 09/22/23 1051     Folate >20.00 ng/mL     Narrative:      Results may be falsely increased if patient taking Biotin.      Vitamin B12 [[id number]]  (Abnormal) Collected: 09/22/23 0244    Specimen: Blood Updated: 09/22/23 1051     Vitamin B-12 >2,000 pg/mL     Narrative:      Results may be falsely increased if patient taking Biotin.      STAT Lactic Acid, Reflex [[id number]]  (Normal) Collected: 09/22/23 1021    Specimen: Blood Updated: 09/22/23 1047     Lactate 1.6 mmol/L     STAT Lactic Acid, Reflex [[id number]]  (Abnormal) Collected: 09/22/23 0846    Specimen: Blood Updated: 09/22/23 0941     Lactate 2.2 mmol/L     STAT Lactic Acid, Reflex [[id number]]  (Abnormal) Collected:  09/22/23 0537    Specimen: Blood Updated: 09/22/23 0652     Lactate 2.3 mmol/L     CANDIDA AURIS SCREEN - Swab, Axilla Right, Axilla Left and Groin [[id number]]  (Normal) Collected: 09/18/23 0535    Specimen: Swab from Axilla Right, Axilla Left and Groin Updated: 09/22/23 0600     Candida Auris Screen Culture No Candida auris isolated at 4 days    POC Glucose Once [[id number]]  (Abnormal) Collected: 09/22/23 0555    Specimen: Blood Updated: 09/22/23 0556     Glucose 213 mg/dL      Comment: Serial Number: [id number]:  [id number]       Lactic Acid, Plasma [[id number]]  (Abnormal) Collected: 09/22/23 0243    Specimen: Blood Updated: 09/22/23 0413     Lactate 2.2 mmol/L     Magnesium [[id number]]  (Abnormal) Collected: 09/22/23 0243    Specimen: Blood Updated: 09/22/23 0356     Magnesium 2.7 mg/dL     Comprehensive Metabolic Panel [[id number]]  (Abnormal) Collected: 09/22/23 0243    Specimen: Blood Updated: 09/22/23 0356     Glucose 237 mg/dL      BUN 72 mg/dL      Creatinine 2.09 mg/dL      Sodium 155 mmol/L      Potassium 3.5 mmol/L      Chloride 120 mmol/L      CO2 20.0 mmol/L      Calcium 9.0 mg/dL      Total Protein 5.6 g/dL      Albumin 3.3 g/dL      ALT (SGPT) 62 U/L      AST (SGOT) 24 U/L      Alkaline Phosphatase 70 U/L      Total Bilirubin 0.3 mg/dL      Globulin 2.3 gm/dL      A/G Ratio 1.4 g/dL      BUN/Creatinine Ratio 34.4     Anion Gap 15.0 mmol/L      eGFR 29.9 mL/min/1.73     Narrative:      GFR Normal >60  Chronic Kidney Disease <60  Kidney Failure <15    The GFR formula is only valid for adults with stable renal function between ages 18 and 70.    Phosphorus [[id number]]  (Normal) Collected: 09/22/23 0243    Specimen: Blood Updated: 09/22/23 0356     Phosphorus 3.3 mg/dL     Calcium, Ionized [[id number]]  (Normal) Collected: 09/22/23 0244    Specimen: Blood Updated: 09/22/23 0345     Ionized Calcium 1.20 mmol/L     CBC & Differential [[id number]]  (Abnormal) Collected: 09/22/23 0243    Specimen: Blood  Updated: 09/22/23 0328    Narrative:      The following orders were created for panel order CBC & Differential.  Procedure                               Abnormality         Status                     ---------                               -----------         ------                     CBC Auto Differential[[id number]]        Abnormal            Final result                 Please view results for these tests on the individual orders.    CBC Auto Differential [[id number]]  (Abnormal) Collected: 09/22/23 0243    Specimen: Blood Updated: 09/22/23 0328     WBC 8.20 10*3/mm3      RBC 2.57 10*6/mm3      Hemoglobin 8.7 g/dL      Hematocrit 26.2 %      .1 fL      MCH 34.0 pg      MCHC 33.3 g/dL      RDW 15.2 %      RDW-SD 57.3 fl      MPV 10.2 fL      Platelets 69 10*3/mm3      Neutrophil % 91.9 %      Lymphocyte % 2.6 %      Monocyte % 5.4 %      Eosinophil % 0.0 %      Basophil % 0.1 %      Neutrophils, Absolute 7.60 10*3/mm3      Lymphocytes, Absolute 0.20 10*3/mm3      Monocytes, Absolute 0.40 10*3/mm3      Eosinophils, Absolute 0.00 10*3/mm3      Basophils, Absolute 0.00 10*3/mm3      nRBC 0.1 /100 WBC     Sodium, Urine, Random - Straight Cath [[id number]] Collected: 09/22/23 0017    Specimen: Urine from Straight Cath Updated: 09/22/23 0050     Sodium, Urine 175 mmol/L     Narrative:      Reference intervals for random urine have not been established.  Clinical usage is dependent upon physician's interpretation in combination with other laboratory tests.       POC Glucose Once [[id number]]  (Abnormal) Collected: 09/21/23 2351    Specimen: Blood Updated: 09/21/23 2352     Glucose 210 mg/dL      Comment: Serial Number: [id number]:  [id number]               Imaging Results (Last 24 Hours)       ** No results found for the last 24 hours. **            LAB RESULTS (LAST 7 DAYS)    CBC  Results from last 7 days   Lab Units 09/22/23  0243 09/21/23  0954 09/20/23  0031 09/19/23  0234 09/18/23  0717   WBC 10*3/mm3  8.20 11.20* 10.20 10.60 2.80*   RBC 10*6/mm3 2.57* 2.97* 3.02* 3.10* 3.60*   HEMOGLOBIN g/dL 8.7* 10.0* 10.4* 10.9* 12.6*   HEMATOCRIT % 26.2* 30.8* 31.4* 32.3* 37.6   MCV fL 102.1* 103.5* 104.1* 104.2* 104.3*   PLATELETS 10*3/mm3 69* 68* 76* 108* 150       BMP  Results from last 7 days   Lab Units 09/22/23  0243 09/21/23  0055 09/20/23  0031 09/19/23  0234 09/18/23  0717   SODIUM mmol/L 155* 149* 143 145 140   POTASSIUM mmol/L 3.5 3.9 4.4 4.7 3.7   CHLORIDE mmol/L 120* 119* 111* 110* 101   CO2 mmol/L 20.0* 18.0* 19.0* 19.0* 19.0*   BUN mg/dL 72* 85* 75* 61* 47*   CREATININE mg/dL 2.09* 2.75* 2.66* 2.09* 1.26   GLUCOSE mg/dL 237* 159* 127* 165* 229*   MAGNESIUM mg/dL 2.7*  --   --   --   --    PHOSPHORUS mg/dL 3.3  --   --   --   --        CMP   Results from last 7 days   Lab Units 09/22/23  0243 09/21/23  1432 09/21/23 0055 09/20/23 0031 09/19/23 0234 09/18/23  0717   SODIUM mmol/L 155*  --  149* 143 145 140   POTASSIUM mmol/L 3.5  --  3.9 4.4 4.7 3.7   CHLORIDE mmol/L 120*  --  119* 111* 110* 101   CO2 mmol/L 20.0*  --  18.0* 19.0* 19.0* 19.0*   BUN mg/dL 72*  --  85* 75* 61* 47*   CREATININE mg/dL 2.09*  --  2.75* 2.66* 2.09* 1.26   GLUCOSE mg/dL 237*  --  159* 127* 165* 229*   ALBUMIN g/dL 3.3* 2.6* 2.4* 2.3* 2.6* 3.5   BILIRUBIN mg/dL 0.3  --  0.4 0.6 0.7 0.9   ALK PHOS U/L 70  --  65 55 46 86   AST (SGOT) U/L 24  --  48* 95* 177* 172*   ALT (SGPT) U/L 62*  --  77* 102* 125* 87*   LIPASE U/L  --   --   --  25 106* 474*       BNP        TROPONIN  Results from last 7 days   Lab Units 09/21/23  0055   CK TOTAL U/L 248*       CoAg  Results from last 7 days   Lab Units 09/21/23 0055 09/18/23  0717   INR  1.41* 1.05       Creatinine Clearance  Estimated Creatinine Clearance: 25.6 mL/min (A) (by C-G formula based on SCr of 2.09 mg/dL (H)).    ABG        Radiology  XR Chest 1 View    Result Date: 9/21/2023  Impression: Patchy bilateral airspace opacities, which could reflect pulmonary edema or pneumonia.  Electronically Signed: Sourav Roberto MD  9/21/2023 8:47 AM EDT  Workstation ID: [id number]    US Renal Bilateral    Result Date: 9/21/2023  Impression: 1. No evidence of urinary tract obstruction. 2. There are 2 simple cyst within the left kidney, unchanged from prior MRI. Electronically Signed: Nato Flores MD  9/21/2023 4:30 PM EDT  Workstation ID: [id number]       EKG  I personally viewed and interpreted the patient's EKG/Telemetry data:  ECG 12 Lead Drug Monitoring; Amiodarone   Preliminary Result   HEART RATE= 69  bpm   RR Interval= 876  ms   FL Interval= 74  ms   P Horizontal Axis= 99  deg   P Front Axis= 58  deg   QRSD Interval= 109  ms   QT Interval= 472  ms   QTcB= 504  ms   QRS Axis= 26  deg   T Wave Axis= 17  deg   - ABNORMAL ECG -   Sinus rhythm   Prolonged QT interval   Electronically Signed By:    Date and Time of Study: 2023-09-22 05:26:34      ECG 12 Lead Drug Monitoring; Amiodarone   Preliminary Result   HEART RATE= 103  bpm   RR Interval= 581  ms   FL Interval=   ms   P Horizontal Axis=   deg   P Front Axis=   deg   QRSD Interval= 133  ms   QT Interval= 375  ms   QTcB= 492  ms   QRS Axis= 16  deg   T Wave Axis= 5  deg   - ABNORMAL ECG -   Atrial fibrillation   Right bundle branch block   When compared with ECG of 21-Sep-2023 16:07:24,   Significant repolarization change   Electronically Signed By:    Date and Time of Study: 2023-09-21 20:46:43      ECG 12 Lead Rhythm Change   Preliminary Result   HEART RATE= 122  bpm   RR Interval= 491  ms   FL Interval=   ms   P Horizontal Axis=   deg   P Front Axis=   deg   QRSD Interval= 127  ms   QT Interval= 341  ms   QTcB= 487  ms   QRS Axis= 2  deg   T Wave Axis= -12  deg   - ABNORMAL ECG -   Atrial fibrillation   Ventricular premature complex   Right bundle branch block   Borderline ST depression, lateral leads   Electronically Signed By:    Date and Time of Study: 2023-09-21 16:07:24      ECG 12 Lead QT Measurement   Final Result   HEART RATE= 83  bpm    RR Interval= 720  ms   WV Interval= 166  ms   P Horizontal Axis= 0  deg   P Front Axis= 7  deg   QRSD Interval= 124  ms   QT Interval= 403  ms   QTcB= 475  ms   QRS Axis= -12  deg   T Wave Axis= 10  deg   - ABNORMAL ECG -   Sinus rhythm   Right bundle branch block   No previous ECG available for comparison   Electronically Signed By: Montana Parker (SHARMAINE) 19-Sep-2023 23:12:35   Date and Time of Study: 2023-09-19 07:01:56      SCANNED - TELEMETRY     Final Result      SCANNED - TELEMETRY     Final Result      SCANNED - TELEMETRY     Final Result      SCANNED - TELEMETRY     Final Result      SCANNED - TELEMETRY     Final Result      SCANNED - TELEMETRY     Final Result      SCANNED - TELEMETRY     Final Result      SCANNED - TELEMETRY     Final Result      SCANNED - TELEMETRY     Final Result      SCANNED - TELEMETRY     Final Result      SCANNED - TELEMETRY     Final Result      SCANNED - TELEMETRY     Final Result      SCANNED - TELEMETRY     Final Result      SCANNED - TELEMETRY     Final Result      SCANNED - TELEMETRY     Final Result      SCANNED - TELEMETRY     Final Result            Echocardiogram:          Stress Test:         Cardiac Catheterization:  No results found for this or any previous visit.         Other:         ASSESSMENT & PLAN:    Principal Problem:    Cholelithiasis  Active Problems:    Pancreatitis    Leukocytosis    Renal insufficiency    Parkinson's disease dementia    Hypothyroidism (acquired)    Hyperlipidemia    Hypokalemia    Essential hypertension    History of coronary artery disease    Choledocholithiasis    Moderate malnutrition      Assessment:    Afib RVR: currently in SR    -On IV amiodarone  HTN: Continue IV Cardizem  mainly for BP control, will try to reduce dose gently  Choledocholithiasis s/p biliary stent  ARF/MILENA  Anemia  Dementia  Parkinsons disease    Plan:    Pt is maintaining SR  DNR status noted; considering hospice involvement  Will try to reduce Cardizem  Unable to  take PO  Mild QT prolongation on EKG  Will reduce Amiodarone to 0.25 mg/min    Additional recommendations per Dr. Veliz    Patient is seen and examined and findings are verified.  All data is reviewed by me personally.  Assessment and plan formulated by APC was done after discussion with attending.  I spent more than 50% of time in taking care of the patient.    Patient is relatively more alert today and less short of breath.  He was very obtunded yesterday.    Hemodynamics are stable blood pressure is slightly on the higher side.  Unfortunately patient is still n.p.o.    Normal S1 and S2.  Heart rate is regular.  Decreased breath sound at the bases no rhonchi or wheezing.  No leg edema noted.    Patient is still on Cardizem and amiodarone.  Patient has converted into sinus rhythm.  I would like to switch amiodarone to p.o.  However patient cannot take anything p.o.  Patient family does not want to proceed with NG tube.  Cardizem is being used for control of blood pressure.  If patient continues to stay in normal rhythm I will recommend to discontinue amiodarone tomorrow even if he cannot take p.o. medication.  If he has recurrence of atrial fibrillation at that time this can be restarted.  Patient family is being considered for possible long time comfort measures.    Electronically signed by Eris Veliz MD, 09/22/23, 6:18 PM EDT.      Eris Veliz MD  09/22/23  18:18 EDT

## 2023-09-23 NOTE — PROGRESS NOTES
"NEPHROLOGY PROGRESS NOTE------[facility]    [facility]  [phone]  Megha Chen MD      Patient Care Team:  Tip Parikh MD as PCP - General (Family Medicine)  Viviane Chen MD as Consulting Physician (Nephrology)      Provider:  Megha Chen MD  Patient Name: Magnus Talbot  :  1934    SUBJECTIVE:    F/U ARF/MILENA/CRF/CKD    Spoke with patient's wife and son. They are planning on comfort care.     Medication:  amiodarone, 150 mg, Intravenous, Once  carbidopa-levodopa, 2 tablet, Oral, TID  cefTRIAXone, 2,000 mg, Intravenous, Q24H  donepezil, 10 mg, Oral, Daily  ferric gluconate, 125 mg, Intravenous, Daily  furosemide, 40 mg, Intravenous, Daily  guaiFENesin-codeine, 10 mL, Oral, Q6H  insulin lispro, 2-7 Units, Subcutaneous, Q6H  ipratropium-albuterol, 3 mL, Nebulization, 4x Daily - RT  memantine, 10 mg, Oral, Nightly  metroNIDAZOLE, 500 mg, Intravenous, Q8H  pantoprazole, 40 mg, Intravenous, Q12H      amiodarone, 0.25 mg/min, Last Rate: 0.25 mg/min (23 0200)  dextrose 5 % with KCl 20 mEq, 125 mL/hr, Last Rate: 125 mL/hr (23 1451)  dilTIAZem, 5-15 mg/hr, Last Rate: 2.5 mg/hr (23 0202)  phenylephrine, 0.5-3 mcg/kg/min        OBJECTIVE    Vital Sign Min/Max for last 24 hours  Temp  Min: 97.6 °F (36.4 °C)  Max: 99.6 °F (37.6 °C)   BP  Min: 127/73  Max: 172/75   Pulse  Min: 66  Max: 108   Resp  Min: 16  Max: 26   SpO2  Min: 89 %  Max: 95 %   No data recorded   No data recorded     Flowsheet Rows      Flowsheet Row First Filed Value   Admission Height 167.6 cm (66\") Documented at 2023 0332   Admission Weight 72.8 kg (160 lb 7.9 oz) Documented at 2023 0332            No intake/output data recorded.  I/O last 3 completed shifts:  In: 0   Out: 4075 [Urine:4075]    Physical Exam:    General Appearance: OBTUNDED  Head: normocephalic, without obvious abnormality and atraumatic +DRY OP WITH POOR " DENTITION  Eyes: conjunctivae and sclerae normal and no icterus  Neck: supple and no JVD  Lungs: +BIBASILAR RALES AND SCATTERED RHONCHI  Heart: IRREG IRREG +SARAH  Chest Wall: no abnormalities observed  Abdomen: normal bowel sounds and soft, nontender  Extremities: moves extremities well, no edema, no cyanosis +DJD  Skin: +NUMEROUS SCATTERED ECCHYMOSIS AND DECREASED SKIN TURGOR  Neurologic: AWAKE BUT NOT ORIENTED. No focal deficits    Labs:    WBC WBC   Date Value Ref Range Status   09/23/2023 9.00 3.40 - 10.80 10*3/mm3 Final   09/22/2023 8.20 3.40 - 10.80 10*3/mm3 Final   09/21/2023 11.20 (H) 3.40 - 10.80 10*3/mm3 Final      HGB Hemoglobin   Date Value Ref Range Status   09/23/2023 9.1 (L) 13.0 - 17.7 g/dL Final   09/22/2023 8.7 (L) 13.0 - 17.7 g/dL Final   09/21/2023 10.0 (L) 13.0 - 17.7 g/dL Final      HCT Hematocrit   Date Value Ref Range Status   09/23/2023 27.4 (L) 37.5 - 51.0 % Final   09/22/2023 26.2 (L) 37.5 - 51.0 % Final   09/21/2023 30.8 (L) 37.5 - 51.0 % Final      Platelets No results found for: LABPLAT   MCV MCV   Date Value Ref Range Status   09/23/2023 101.3 (H) 79.0 - 97.0 fL Final   09/22/2023 102.1 (H) 79.0 - 97.0 fL Final   09/21/2023 103.5 (H) 79.0 - 97.0 fL Final          Sodium Sodium   Date Value Ref Range Status   09/23/2023 153 (H) 136 - 145 mmol/L Final   09/22/2023 155 (H) 136 - 145 mmol/L Final   09/21/2023 149 (H) 136 - 145 mmol/L Final      Potassium Potassium   Date Value Ref Range Status   09/23/2023 2.9 (L) 3.5 - 5.2 mmol/L Final     Comment:     Slight hemolysis detected by analyzer. Results may be affected.   09/22/2023 3.5 3.5 - 5.2 mmol/L Final   09/21/2023 3.9 3.5 - 5.2 mmol/L Final     Comment:     Slight hemolysis detected by analyzer. Results may be affected.      Chloride Chloride   Date Value Ref Range Status   09/23/2023 118 (H) 98 - 107 mmol/L Final   09/22/2023 120 (H) 98 - 107 mmol/L Final   09/21/2023 119 (H) 98 - 107 mmol/L Final      CO2 CO2   Date Value Ref Range  Status   09/23/2023 24.0 22.0 - 29.0 mmol/L Final   09/22/2023 20.0 (L) 22.0 - 29.0 mmol/L Final   09/21/2023 18.0 (L) 22.0 - 29.0 mmol/L Final      BUN BUN   Date Value Ref Range Status   09/23/2023 48 (H) 8 - 23 mg/dL Final   09/22/2023 72 (H) 8 - 23 mg/dL Final   09/21/2023 85 (H) 8 - 23 mg/dL Final      Creatinine Creatinine   Date Value Ref Range Status   09/23/2023 1.54 (H) 0.76 - 1.27 mg/dL Final   09/22/2023 2.09 (H) 0.76 - 1.27 mg/dL Final   09/21/2023 2.75 (H) 0.76 - 1.27 mg/dL Final      Calcium Calcium   Date Value Ref Range Status   09/23/2023 8.4 (L) 8.6 - 10.5 mg/dL Final   09/22/2023 9.0 8.6 - 10.5 mg/dL Final   09/21/2023 7.7 (L) 8.6 - 10.5 mg/dL Final      PO4 No components found for: PO4   Albumin Albumin   Date Value Ref Range Status   09/23/2023 3.2 (L) 3.5 - 5.2 g/dL Final   09/22/2023 3.3 (L) 3.5 - 5.2 g/dL Final   09/21/2023 2.6 (L) 2.9 - 4.4 g/dL Final   09/21/2023 2.4 (L) 3.5 - 5.2 g/dL Final      Magnesium Magnesium   Date Value Ref Range Status   09/23/2023 2.2 1.6 - 2.4 mg/dL Final   09/22/2023 2.7 (H) 1.6 - 2.4 mg/dL Final      Uric Acid No components found for: URIC ACID     Imaging Results (Last 72 Hours)       Procedure Component Value Units Date/Time    US Renal Bilateral [[id number]] Collected: 09/21/23 1626     Updated: 09/21/23 1632    Narrative:      US RENAL BILATERAL    Date of Exam: 9/21/2023 3:08 PM EDT    Indication: ARF/MILENA/CRF/CKD.    Comparison: MRI abdomen 9/18/2023    Technique: Grayscale and color Doppler ultrasound evaluation of the kidneys and urinary bladder was performed.      Findings:  Right kidney measures 11.2 cm and the left kidney measures 11.2 cm in [id number]-ip-kncw length. There is normal thickness and normal echogenicity of the renal parenchyma bilaterally. There is no evidence of hydronephrosis as demonstrated on prior MRI. There   is an anechoic 4.4 cm cyst of the lateral cortex of the mid left kidney. This appears unchanged from prior MRI as well. There is  an additional 3.2 cm cyst of the upper pole of the left kidney also unchanged from prior MRI. There is color kidneys.    Limited imaging unremarkable.      Impression:      Impression:    1. No evidence of urinary tract obstruction.  2. There are 2 simple cyst within the left kidney, unchanged from prior MRI.        Electronically Signed: Nato Flores MD    9/21/2023 4:30 PM EDT    Workstation ID: [id number]    XR Chest 1 View [[id number]] Collected: 09/21/23 0843     Updated: 09/21/23 0849    Narrative:      XR CHEST 1 VW    Date of Exam: 9/21/2023 8:16 AM EDT    Indication: SOB    Comparison: September 19, 2023    Findings:  There is a calcified granuloma in the right lower lung. There are patchy bilateral airspace opacities. The heart and mediastinal contours appear normal. The osseous structures appear intact.      Impression:      Impression:  Patchy bilateral airspace opacities, which could reflect pulmonary edema or pneumonia.      Electronically Signed: Sourav Roberto MD    9/21/2023 8:47 AM EDT    Workstation ID: [id number]            Results for orders placed during the hospital encounter of 09/18/23    XR Chest 1 View    Narrative  XR CHEST 1 VW    Date of Exam: 9/21/2023 8:16 AM EDT    Indication: SOB    Comparison: September 19, 2023    Findings:  There is a calcified granuloma in the right lower lung. There are patchy bilateral airspace opacities. The heart and mediastinal contours appear normal. The osseous structures appear intact.    Impression  Impression:  Patchy bilateral airspace opacities, which could reflect pulmonary edema or pneumonia.      Electronically Signed: Sourav Roberto MD  9/21/2023 8:47 AM EDT  Workstation ID: [id number]      XR Chest 2 View    Narrative  XR CHEST 2 VW    Date of Exam: 9/19/2023 3:00 PM EDT    Indication: possible aspiration, requires more O2    Comparison: 9/18/2023    Findings:  Heart size is within normal limits. Pulmonary vessels are normal. Lung volumes are  low. No focal airspace consolidation. No pleural effusion or pneumothorax. Calcified granuloma seen within the right lower lobe.    Impression  Impression:    1. Low volume inspiration. No acute cardiopulmonary disease.      Electronically Signed: Nato Flores MD  9/19/2023 3:35 PM EDT  Workstation ID: [id number]      XR Skull Lateral & Ap    Narrative  XR SKULL LATERAL AND AP    Date of Exam: 9/18/2023 11:44 AM EDT    Indication: needed prior to MRI    Comparison: None available.    Technique: 2 views of the skull were obtained.    Findings:  No unexpected retained radiopaque foreign body is seen within the skull or imaged neck. An artifact of a button, external to the patient, projects over the lower neck soft tissues on the lateral view. No suspicious osseous abnormalities. The patient is  edentulous. There degenerative changes of the cervical spine, most notable at C5-6    Impression  Impression: No unexpected retained opaque foreign body is seen in the skull.    Electronically Signed: Kaur Ward MD  9/18/2023 11:48 AM EDT  Workstation ID: [id number]            ASSESSMENT / PLAN      Cholelithiasis    Pancreatitis    Leukocytosis    Renal insufficiency    Parkinson's disease dementia    Hypothyroidism (acquired)    Hyperlipidemia    Hypokalemia    Essential hypertension    History of coronary artery disease    Choledocholithiasis    Moderate malnutrition      Agree with plans for hospice/comfort care. Not much else to add from a renal standpoint. Please call if plans change.     Megha Chen MD  [facility]  [phone]  09/23/23  07:49 EDT

## 2023-09-23 NOTE — PLAN OF CARE
Goal Outcome Evaluation:   Pt is now comfort measures. ST will sign off at this time.

## 2023-09-23 NOTE — PLAN OF CARE
Goal Outcome Evaluation:  Plan of Care Reviewed With: patient, spouse        Progress: no change     Patient has been non verbal, does open eyes and looks at you but unable to make needs known. Wife is at bedside with patient. Patient is on Amiodarone and Cardizem drip and continuous fluids. Patient is on 5L of humidified oxygen.  Order was put in for a NG tube to be placed before this shift, however; family has been discussing this and could possibly want it placed tomorrow they wanted to think about it and let us know on Saturday. No issues or concerns noted at this time.

## 2023-09-23 NOTE — PROGRESS NOTES
Referring Provider: Kaden Lugo DO    Reason for follow-up: Atrial fibrillation     Patient Care Team:  Tip Parikh MD as PCP - General (Family Medicine)  Viviane Chen MD as Consulting Physician (Nephrology)      SUBJECTIVE    I was called this morning because of bradycardia and pauses up to 2 seconds     ROS  Review of all systems negative except as indicated.    Since I have last seen, the patient has been without any chest discomfort, shortness of breath, palpitations, dizziness or syncope.  Denies having any headache, abdominal pain, nausea, vomiting, diarrhea, constipation, loss of weight or loss of appetite.  Denies having any excessive bruising, hematuria or blood in the stool.  ROS      Personal History:    Past Medical History:   Diagnosis Date    CAD (coronary artery disease)     Dementia     Parkinsons     Thyroid disease        Past Surgical History:   Procedure Laterality Date    ERCP N/A 9/18/2023    Procedure: ENDOSCOPIC RETROGRADE CHOLANGIOPANCREATOGRAPHY, SPHINTEROTOMY, SPHINTEROPLASTY, CLEARANCE OF BILE DUCT WITH BALLOON (12MM-15MM, UP TO 15MM) EXTRACTION OF BILIARY STONES WITH BALLOON , OCCLUSION CHOLANGIOGRAM,  PLACEMENT OF BILIARY STENT, ESOPHAGOGASTRODUODNESCOPY;  Surgeon: Ray Hitchcock MD;  Location: [facility] ENDOSCOPY;  Service: Gastroenterology;  Laterality: N/A;  POST: CHOLEDOCHOLE    LEFT HEART CATH         History reviewed. No pertinent family history.    Social History     Tobacco Use    Smoking status: Former     Types: Cigarettes   Vaping Use    Vaping Use: Never used   Substance Use Topics    Alcohol use: Yes     Alcohol/week: 7.0 standard drinks     Types: 7 Glasses of wine per week    Drug use: No        Home meds:  Prior to Admission medications    Medication Sig Start Date End Date Taking? Authorizing Provider   acetaminophen (TYLENOL) 500 MG tablet Take 1 tablet by mouth Daily.   Yes Provider, MD Leann   aspirin 81 MG EC tablet Take 1 tablet by  mouth Daily.   Yes Leann Ochoa MD   atenolol (TENORMIN) 25 MG tablet Take 0.5 tablets by mouth Daily.   Yes Leann Ochoa MD   carbidopa-levodopa (SINEMET)  MG per tablet Take 2 tablets by mouth 3 (Three) Times a Day.   Yes Leann Ochoa MD   donepezil (ARICEPT) 10 MG tablet Take 1 tablet by mouth Daily.   Yes Leann Ochoa MD   doxazosin (CARDURA) 4 MG tablet Take 0.5 tablets by mouth Daily.   Yes Leann Ochoa MD   finasteride (PROSCAR) 5 MG tablet Take 1 tablet by mouth Daily.   Yes Leann Ochoa MD   gabapentin (NEURONTIN) 300 MG capsule Take 1 capsule by mouth 3 (Three) Times a Day.   Yes Leann Ochoa MD   hydroCHLOROthiazide (MICROZIDE) 12.5 MG capsule Take 1 capsule by mouth Daily.   Yes Leann Ochoa MD   melatonin 5 MG tablet tablet Take 1 tablet by mouth Every Night.   Yes Leann Ochoa MD   memantine (NAMENDA) 10 MG tablet Take 1 tablet by mouth Every Night.   Yes Leann Ochoa MD   Naproxen Sodium 220 MG capsule Take 440 mg by mouth Daily.   Yes Leann Ochoa MD   omeprazole (priLOSEC) 20 MG capsule Take 1 capsule by mouth Daily.   Yes Leann Ochoa MD   vitamin B-12 (CYANOCOBALAMIN) 1000 MCG tablet Take 1 tablet by mouth Daily.   Yes Leann Ochoa MD   Ascorbic Acid ([employer] Vitamin C Drops) 60 MG lozenge Dissolve 1 lozenge in the mouth 3 (Three) Times a Day As Needed.    Leann Ochoa MD   Miconazole Nitrate 2 % aerosol Apply 1 application  topically 2 (Two) Times a Day.    Leann Ochoa MD       Allergies:  Patient has no known allergies.    Scheduled Meds:amiodarone, 150 mg, Intravenous, Once  calcium gluconate, 1,000 mg, Intravenous, Once  carbidopa-levodopa, 2 tablet, Oral, TID  cefTRIAXone, 2,000 mg, Intravenous, Q24H  donepezil, 10 mg, Oral, Daily  ferric gluconate, 125 mg, Intravenous, Daily  furosemide, 40 mg, Intravenous, Daily  guaiFENesin-codeine, 10 mL,  "Oral, Q6H  insulin lispro, 2-7 Units, Subcutaneous, Q6H  ipratropium-albuterol, 3 mL, Nebulization, 4x Daily - RT  memantine, 10 mg, Oral, Nightly  metroNIDAZOLE, 500 mg, Intravenous, Q8H  pantoprazole, 40 mg, Intravenous, Q12H  potassium phosphate, 15 mmol, Intravenous, Q3H      Continuous Infusions:amiodarone, 0.25 mg/min, Last Rate: 0.25 mg/min (09/23/23 0200)  dextrose 5 % with KCl 20 mEq, 125 mL/hr, Last Rate: 125 mL/hr (09/22/23 1451)  dilTIAZem, 5-15 mg/hr, Last Rate: 2.5 mg/hr (09/23/23 0202)      PRN Meds:.  dextrose    dextrose    glucagon (human recombinant)    HYDROmorphone    ondansetron    ondansetron **OR** ondansetron    Potassium Replacement - Follow Nurse / BPA Driven Protocol      OBJECTIVE    Vital Signs  Vitals:    09/23/23 0359 09/23/23 0400 09/23/23 0721 09/23/23 0728   BP: 134/74 134/74     BP Location: Left arm      Patient Position: Lying      Pulse: 100 99 85 78   Resp: 21  17 20   Temp: 97.6 °F (36.4 °C)      TempSrc: Axillary      SpO2: 92% 91% 90% 93%   Weight:       Height:           Flowsheet Rows      Flowsheet Row First Filed Value   Admission Height 167.6 cm (66\") Documented at 09/18/2023 0332   Admission Weight 72.8 kg (160 lb 7.9 oz) Documented at 09/18/2023 0332              Intake/Output Summary (Last 24 hours) at 9/23/2023 0804  Last data filed at 9/23/2023 0400  Gross per 24 hour   Intake --   Output 3775 ml   Net -3775 ml          Telemetry: Pauses noted on telemetry, atrial fibrillation, sinus bradycardia noted.    Physical Exam:  The patient appears in mild respiratory distress and minimally responsive  Vital signs as noted above.  Head and neck revealed no carotid bruits or jugular venous distention.  No thyromegaly or lymphadenopathy is present  Lungs clear.  No wheezing.  Breath sounds are normal bilaterally.  Heart normal first and second heart sounds.  No murmur. No precordial rub is present.  No gallop is present.  Abdomen soft and nontender.  No organomegaly is " present.  Extremities with good peripheral pulses without any pedal edema.  Skin warm and dry.  Musculoskeletal system is grossly normal.  CNS grossly normal.       Results Review:  I have personally reviewed the results from the time of this admission to 9/23/2023 08:04 EDT and agree with these findings:  []  Laboratory  []  Microbiology  []  Radiology  []  EKG/Telemetry   []  Cardiology/Vascular   []  Pathology  []  Old records  []  Other:    Most notable findings include:    Lab Results (last 24 hours)       Procedure Component Value Units Date/Time    POC Glucose Once [[id number]]  (Abnormal) Collected: 09/23/23 0601    Specimen: Blood Updated: 09/23/23 0603     Glucose 217 mg/dL      Comment: Serial Number: [id number]:  [id number]       CANDIDA AURIS SCREEN - Swab, Axilla Right, Axilla Left and Groin [[id number]]  (Normal) Collected: 09/18/23 0535    Specimen: Swab from Axilla Right, Axilla Left and Groin Updated: 09/23/23 0601     Candida Auris Screen Culture No Candida auris isolated at 5 days    Phosphorus [[id number]]  (Abnormal) Collected: 09/23/23 0505    Specimen: Blood Updated: 09/23/23 0559     Phosphorus 2.0 mg/dL     Magnesium [[id number]]  (Normal) Collected: 09/23/23 0505    Specimen: Blood Updated: 09/23/23 0551     Magnesium 2.2 mg/dL     Comprehensive Metabolic Panel [[id number]]  (Abnormal) Collected: 09/23/23 0505    Specimen: Blood Updated: 09/23/23 0551     Glucose 253 mg/dL      BUN 48 mg/dL      Creatinine 1.54 mg/dL      Sodium 153 mmol/L      Potassium 2.9 mmol/L      Comment: Slight hemolysis detected by analyzer. Results may be affected.        Chloride 118 mmol/L      CO2 24.0 mmol/L      Calcium 8.4 mg/dL      Total Protein 5.5 g/dL      Albumin 3.2 g/dL      ALT (SGPT) 41 U/L      AST (SGOT) 16 U/L      Alkaline Phosphatase 69 U/L      Total Bilirubin 0.4 mg/dL      Globulin 2.3 gm/dL      A/G Ratio 1.4 g/dL      BUN/Creatinine Ratio 31.2     Anion Gap 11.0 mmol/L      eGFR 43.1  mL/min/1.73     Narrative:      GFR Normal >60  Chronic Kidney Disease <60  Kidney Failure <15    The GFR formula is only valid for adults with stable renal function between ages 18 and 70.    Manual Differential [[id number]]  (Abnormal) Collected: 09/23/23 0210    Specimen: Blood Updated: 09/23/23 0334     Neutrophil % 93.0 %      Lymphocyte % 2.0 %      Monocyte % 2.0 %      Bands %  3.0 %      Neutrophils Absolute 8.64 10*3/mm3      Lymphocytes Absolute 0.18 10*3/mm3      Monocytes Absolute 0.18 10*3/mm3      RBC Morphology Normal     WBC Morphology Normal     Large Platelets Slight/1+    CBC & Differential [[id number]]  (Abnormal) Collected: 09/23/23 0210    Specimen: Blood Updated: 09/23/23 0334    Narrative:      The following orders were created for panel order CBC & Differential.  Procedure                               Abnormality         Status                     ---------                               -----------         ------                     CBC Auto Differential[[id number]]        Abnormal            Final result               Scan Slide[[id number]]                                       Final result                 Please view results for these tests on the individual orders.    CBC Auto Differential [[id number]]  (Abnormal) Collected: 09/23/23 0210    Specimen: Blood Updated: 09/23/23 0334     WBC 9.00 10*3/mm3      RBC 2.70 10*6/mm3      Hemoglobin 9.1 g/dL      Hematocrit 27.4 %      .3 fL      MCH 33.5 pg      MCHC 33.1 g/dL      RDW 15.2 %      RDW-SD 56.9 fl      MPV 10.5 fL      Platelets 95 10*3/mm3     Narrative:      The previously reported component NRBC is no longer being reported. Previous result was 0.1 /100 WBC (Reference Range: 0.0-0.2 /100 WBC) on 9/23/2023 at [facility] EDT.    Scan Slide [[id number]] Collected: 09/23/23 0210    Specimen: Blood Updated: 09/23/23 0334     Scan Slide --     Comment: See Manual Differential Results       Lactic Acid, Plasma [[id number]]  (Normal)  Collected: 09/23/23 0210    Specimen: Blood Updated: 09/23/23 0254     Lactate 1.6 mmol/L     Calcium, Ionized [[id number]]  (Abnormal) Collected: 09/23/23 0210    Specimen: Blood Updated: 09/23/23 0249     Ionized Calcium 1.14 mmol/L     POC Glucose Once [[id number]]  (Abnormal) Collected: 09/23/23 0021    Specimen: Blood Updated: 09/23/23 0023     Glucose 234 mg/dL      Comment: Serial Number: [id number]:  [id number]       POC Glucose Once [[id number]]  (Abnormal) Collected: 09/22/23 1806    Specimen: Blood Updated: 09/22/23 1808     Glucose 254 mg/dL      Comment: Serial Number: [id number]:  [id number]       Protein Elec + Interp, Serum [[id number]]  (Abnormal) Collected: 09/21/23 1432    Specimen: Blood Updated: 09/22/23 1412     Total Protein 5.4 g/dL      Albumin 2.6 g/dL      Alpha-1-Globulin 0.4 g/dL      Alpha-2-Globulin 0.9 g/dL      Beta Globulin 0.7 g/dL      Gamma Globulin 0.8 g/dL      M-Patric Not Observed g/dL      Globulin 2.8 g/dL      A/G Ratio 0.9     Please note Comment     Comment: Protein electrophoresis scan will follow via computer, mail, or   delivery.        SPE Interpretation Comment:     Comment: SPE shows decreased total protein and albumin.       Narrative:      Performed at:  [address] [place], [place] [id number]  : Phuc Jarquin PhD, Phone:  [phone]    POC Glucose Once [[id number]]  (Abnormal) Collected: 09/22/23 1236    Specimen: Blood Updated: 09/22/23 1238     Glucose 179 mg/dL      Comment: Serial Number: [id number]:  [id number]       Folate [[id number]]  (Normal) Collected: 09/22/23 0244    Specimen: Blood Updated: 09/22/23 1051     Folate >20.00 ng/mL     Narrative:      Results may be falsely increased if patient taking Biotin.      Vitamin B12 [[id number]]  (Abnormal) Collected: 09/22/23 0244    Specimen: Blood Updated: 09/22/23 1051     Vitamin B-12 >2,000 pg/mL     Narrative:      Results may be falsely increased if  patient taking Biotin.      STAT Lactic Acid, Reflex [[id number]]  (Normal) Collected: 09/22/23 1021    Specimen: Blood Updated: 09/22/23 1047     Lactate 1.6 mmol/L     STAT Lactic Acid, Reflex [[id number]]  (Abnormal) Collected: 09/22/23 0846    Specimen: Blood Updated: 09/22/23 0941     Lactate 2.2 mmol/L             Imaging Results (Last 24 Hours)       ** No results found for the last 24 hours. **            LAB RESULTS (LAST 7 DAYS)    CBC  Results from last 7 days   Lab Units 09/23/23  0210 09/22/23  0243 09/21/23  0954 09/20/23  0031 09/19/23  0234 09/18/23  0717   WBC 10*3/mm3 9.00 8.20 11.20* 10.20 10.60 2.80*   RBC 10*6/mm3 2.70* 2.57* 2.97* 3.02* 3.10* 3.60*   HEMOGLOBIN g/dL 9.1* 8.7* 10.0* 10.4* 10.9* 12.6*   HEMATOCRIT % 27.4* 26.2* 30.8* 31.4* 32.3* 37.6   MCV fL 101.3* 102.1* 103.5* 104.1* 104.2* 104.3*   PLATELETS 10*3/mm3 95* 69* 68* 76* 108* 150       BMP  Results from last 7 days   Lab Units 09/23/23  0505 09/22/23  0243 09/21/23  0055 09/20/23  0031 09/19/23  0234 09/18/23  0717   SODIUM mmol/L 153* 155* 149* 143 145 140   POTASSIUM mmol/L 2.9* 3.5 3.9 4.4 4.7 3.7   CHLORIDE mmol/L 118* 120* 119* 111* 110* 101   CO2 mmol/L 24.0 20.0* 18.0* 19.0* 19.0* 19.0*   BUN mg/dL 48* 72* 85* 75* 61* 47*   CREATININE mg/dL 1.54* 2.09* 2.75* 2.66* 2.09* 1.26   GLUCOSE mg/dL 253* 237* 159* 127* 165* 229*   MAGNESIUM mg/dL 2.2 2.7*  --   --   --   --    PHOSPHORUS mg/dL 2.0* 3.3  --   --   --   --        CMP   Results from last 7 days   Lab Units 09/23/23  0505 09/22/23  0243 09/21/23  1432 09/21/23  0055 09/20/23  0031 09/19/23  0234 09/18/23  0717   SODIUM mmol/L 153* 155*  --  149* 143 145 140   POTASSIUM mmol/L 2.9* 3.5  --  3.9 4.4 4.7 3.7   CHLORIDE mmol/L 118* 120*  --  119* 111* 110* 101   CO2 mmol/L 24.0 20.0*  --  18.0* 19.0* 19.0* 19.0*   BUN mg/dL 48* 72*  --  85* 75* 61* 47*   CREATININE mg/dL 1.54* 2.09*  --  2.75* 2.66* 2.09* 1.26   GLUCOSE mg/dL 253* 237*  --  159* 127* 165* 229*   ALBUMIN g/dL  3.2* 3.3* 2.6* 2.4* 2.3* 2.6* 3.5   BILIRUBIN mg/dL 0.4 0.3  --  0.4 0.6 0.7 0.9   ALK PHOS U/L 69 70  --  65 55 46 86   AST (SGOT) U/L 16 24  --  48* 95* 177* 172*   ALT (SGPT) U/L 41 62*  --  77* 102* 125* 87*   LIPASE U/L  --   --   --   --  25 106* 474*       BNP        TROPONIN  Results from last 7 days   Lab Units 09/21/23  0055   CK TOTAL U/L 248*       CoAg  Results from last 7 days   Lab Units 09/21/23 0055 09/18/23  0717   INR  1.41* 1.05       Creatinine Clearance  Estimated Creatinine Clearance: 34.7 mL/min (A) (by C-G formula based on SCr of 1.54 mg/dL (H)).    ABG        Radiology  XR Chest 1 View    Result Date: 9/21/2023  Impression: Patchy bilateral airspace opacities, which could reflect pulmonary edema or pneumonia. Electronically Signed: Sourav Roberto MD  9/21/2023 8:47 AM EDT  Workstation ID: [id number]     Renal Bilateral    Result Date: 9/21/2023  Impression: 1. No evidence of urinary tract obstruction. 2. There are 2 simple cyst within the left kidney, unchanged from prior MRI. Electronically Signed: Nato Flores MD  9/21/2023 4:30 PM EDT  Workstation ID: [id number]       EKG  I personally viewed and interpreted the patient's EKG/Telemetry data:  ECG 12 Lead Drug Monitoring; Amiodarone   Preliminary Result   HEART RATE= 104  bpm   RR Interval= 562  ms   VT Interval=   ms   P Horizontal Axis=   deg   P Front Axis=   deg   QRSD Interval= 131  ms   QT Interval= 388  ms   QTcB= 518  ms   QRS Axis= -1  deg   T Wave Axis= -70  deg   - ABNORMAL ECG -   Atrial flutter   Ventricular premature complex   Right bundle branch block   When compared with ECG of 22-Sep-2023 5:26:34,   Significant change in rhythm: previously sinus   New conduction abnormality   Significant repolarization change   Electronically Signed By:    Date and Time of Study: 2023-09-23 05:38:34      ECG 12 Lead Drug Monitoring; Amiodarone   Preliminary Result   HEART RATE= 69  bpm   RR Interval= 876  ms   VT Interval= 74  ms   P  Horizontal Axis= 99  deg   P Front Axis= 58  deg   QRSD Interval= 109  ms   QT Interval= 472  ms   QTcB= 504  ms   QRS Axis= 26  deg   T Wave Axis= 17  deg   - ABNORMAL ECG -   Sinus rhythm   Prolonged QT interval   Electronically Signed By:    Date and Time of Study: 2023-09-22 05:26:34      ECG 12 Lead Drug Monitoring; Amiodarone   Preliminary Result   HEART RATE= 103  bpm   RR Interval= 581  ms   ME Interval=   ms   P Horizontal Axis=   deg   P Front Axis=   deg   QRSD Interval= 133  ms   QT Interval= 375  ms   QTcB= 492  ms   QRS Axis= 16  deg   T Wave Axis= 5  deg   - ABNORMAL ECG -   Atrial fibrillation   Right bundle branch block   When compared with ECG of 21-Sep-2023 16:07:24,   Significant repolarization change   Electronically Signed By:    Date and Time of Study: 2023-09-21 20:46:43      ECG 12 Lead Rhythm Change   Preliminary Result   HEART RATE= 122  bpm   RR Interval= 491  ms   ME Interval=   ms   P Horizontal Axis=   deg   P Front Axis=   deg   QRSD Interval= 127  ms   QT Interval= 341  ms   QTcB= 487  ms   QRS Axis= 2  deg   T Wave Axis= -12  deg   - ABNORMAL ECG -   Atrial fibrillation   Ventricular premature complex   Right bundle branch block   Borderline ST depression, lateral leads   Electronically Signed By:    Date and Time of Study: 2023-09-21 16:07:24      ECG 12 Lead QT Measurement   Final Result   HEART RATE= 83  bpm   RR Interval= 720  ms   ME Interval= 166  ms   P Horizontal Axis= 0  deg   P Front Axis= 7  deg   QRSD Interval= 124  ms   QT Interval= 403  ms   QTcB= 475  ms   QRS Axis= -12  deg   T Wave Axis= 10  deg   - ABNORMAL ECG -   Sinus rhythm   Right bundle branch block   No previous ECG available for comparison   Electronically Signed By: Montana Parker ([facility]) 19-Sep-2023 23:12:35   Date and Time of Study: 2023-09-19 07:01:56      SCANNED - TELEMETRY     Final Result      SCANNED - TELEMETRY     Final Result      SCANNED - TELEMETRY     Final Result      SCANNED - TELEMETRY      Final Result      SCANNED - TELEMETRY     Final Result      SCANNED - TELEMETRY     Final Result      SCANNED - TELEMETRY     Final Result      SCANNED - TELEMETRY     Final Result      SCANNED - TELEMETRY     Final Result      SCANNED - TELEMETRY     Final Result      SCANNED - TELEMETRY     Final Result      SCANNED - TELEMETRY     Final Result      SCANNED - TELEMETRY     Final Result      SCANNED - TELEMETRY     Final Result      SCANNED - TELEMETRY     Final Result      SCANNED - TELEMETRY     Final Result      SCANNED - TELEMETRY     Final Result      SCANNED - TELEMETRY     Final Result            Echocardiogram:          Stress Test:         Cardiac Catheterization:  No results found for this or any previous visit.         Other:         ASSESSMENT & PLAN:    Principal Problem:    Cholelithiasis  Active Problems:    Pancreatitis    Leukocytosis    Renal insufficiency    Parkinson's disease dementia    Hypothyroidism (acquired)    Hyperlipidemia    Hypokalemia    Essential hypertension    History of coronary artery disease    Choledocholithiasis    Moderate malnutrition    Atrial fibrillation  Converted to sinus rhythm and now bradycardic  I will discontinue Cardizem and amiodarone.  Pauses on telemetry noted.    Choledocholithiasis   Status post ERCP with stone extraction and stent placement on 9/18    CKD  Creatinine 1.5/BUN 48 with GFR of 43  Hyponatremia with sodium of 153 and potassium of 2.9  Correct potassium    Lactate has improved    Anemia with H&H of 9.1/27.4    Parkinson's dementia  Metabolic encephalopathy    Discussion held with patient's family members at bedside regarding poor prognosis.  Family members decided on comfort/hospice care for the patient.  No further aggressive medical measures.    Inpatient cardiology team will be available as needed.      Montana Parker MD  09/23/23  08:04 EDT

## 2023-09-23 NOTE — PROGRESS NOTES
[facility] Medicine Services   Daily Progress Note      Patient Name: Magnus Talbot  : 1934  MRN: [id number]  Primary Care Physician:  Tip Parikh MD  Date of admission: 2023      Subjective      Chief Complaint: Abdominal pain    Patient seen and examined this morning.  Overall worsening respiratory status, lethargic, does not follow commands.  I again had extensive discussion with patient's wife and son at bedside.  Overall poor prognosis noted.  They do not want any further aggressive therapy and would like to make him comfortable.  Comfort care meds initiated.  Discussed with nursing and specialty services.    Unable to obtain full review of systems due to patient's underlying mental status.      Objective      Vitals:   Temp:  [97.6 °F (36.4 °C)-99.6 °F (37.6 °C)] 97.6 °F (36.4 °C)  Heart Rate:  [] 130  Resp:  [17-26] 25  BP: (127-172)/(66-85) 134/74  Flow (L/min):  [2-5] 5    Physical Exam:    General: Lethargic and ill-appearing, does not follow commands, lying in bed  Cardiovascular: Tachycardic, irregularly irregular rhythm, no murmurs  Respiratory: Rhonchi and rales bilaterally, no wheezing  Abdomen: Soft, generalized tenderness noted, positive bowel sounds, no guarding  Neurologic: Lethargic, does not follow commands, limited  Musculoskeletal: No joint, no other gross deformities  Skin: Warm, dry         Result Review    Result Review:  I have personally reviewed the results from the time of this admission to 2023 10:58 EDT and agree with these findings:  [x]  Laboratory  []  Microbiology  []  Radiology  []  EKG/Telemetry   []  Cardiology/Vascular   []  Pathology  []  Old records  []  Other:          Assessment & Plan      Brief Patient Summary:  Magnus Talbot is a 88 y.o. male who                     I have utilized all available, immediate resources to obtain, update, or review the patient's current medications including all prescriptions, over-the-counter  products, herbals, cannabis/cannabidiol products, and vitamin.mineral/dietary (nutritional) supplements.    Active Hospital Problems:  Active Hospital Problems    Diagnosis     **Cholelithiasis     Moderate malnutrition     Pancreatitis     Leukocytosis     Renal insufficiency     Parkinson's disease dementia     Hypothyroidism (acquired)     Hyperlipidemia     Hypokalemia     Essential hypertension     History of coronary artery disease     Choledocholithiasis      Plan:     Choledocholithiasis with biliary pancreatitis  -s/p ERCP with stone extraction and stent placement on 9/18  -GI also notes possible aspiration event during the procedure  -Patient has been declining condition and remains confused, unable to tolerate any p.o.  -Initially on antibiotics with IV Rocephin and Flagyl for now  -After further discussion with patient's wife and son at bedside, patient made comfort care on 9/23  -Comfort care meds initiated    Acute respiratory failure with hypoxia  Likely aspiration pneumonia  -Patient respiratory status appears to be worsening as patient is becoming more lethargic and confused  -As per discussion above, continue comfort care only    MILENA on CKD IIIA  Hypernatremia  -Creatinine and sodium worsened  -Continue comfort care, discussed with nephrology    New onset A-fib with RVR  -Required Cardizem and amiodarone drip per cardiology  -Patient is not a candidate for long-term anticoagulation due to underlying morbidities  -Comfort care as above, discussed with cardiology    Acute metabolic encephalopathy  Dysphagia  -All secondary to above   -Management as noted above    Parkinson's disease with dementia  -Mental status worsening as above  -Supportive care    DVT prophylaxis  -Comfort care    CODE STATUS:    Level Of Support Discussed With: Health Care Surrogate; Next of Kin (If No Surrogate)  Code Status (Patient has no pulse and is not breathing): No CPR (Do Not Attempt to Resuscitate)  Medical  Interventions (Patient has pulse or is breathing): Comfort Measures  Comments: d/w son and wife      Disposition: Guarded prognosis.  Patient switched to comfort care on 9/22 after further discussion with family.    Electronically signed by Kaden Lugo DO, 09/23/23, 10:58 EDT.  [facility] [place] [facility]      Part of this note may be an electronic transcription/translation of spoken language to printed text using the Dragon Dictation System.

## 2023-09-23 NOTE — PLAN OF CARE
Goal Outcome Evaluation:      Pt appears to be resting comfortably in bed. Will cont to monitor/f/u.

## 2023-09-23 NOTE — NURSING NOTE
Wife advised during shift change that she had spoken to the son about the NG tube and he wanted to think about it, she said they might want to go forward with it on Saturday, but not sure will wait for son to come in tomorrow to discuss further placement.

## 2023-09-24 NOTE — CASE MANAGEMENT/SOCIAL WORK
Case Management Discharge Note      Final Note:         Transportation Services  Private: Car    Final Discharge Disposition Code: 20 -

## 2023-10-01 NOTE — DISCHARGE SUMMARY
[facility] Medicine Services  DEATH SUMMARY      Date of Death:  2023  Cause of Death: Choledocholithiasis with biliary pancreatitis, Acute respiratory failure with hypoxia, Aspiration Pneumonia  Final Diagnosis: Choledocholithiasis with biliary pancreatitis, Acute respiratory failure with hypoxia, Aspiration Pneumonia    Presenting Problem/History of Present Illness  Active Hospital Problems    Diagnosis  POA    **Cholelithiasis [K80.20]  Yes    Moderate malnutrition [E44.0]  Yes    Pancreatitis [K85.90]  Yes    Leukocytosis [D72.829]  Yes    Renal insufficiency [N28.9]  Yes    Parkinson's disease dementia [G20, F02.80]  Yes    Hypothyroidism (acquired) [E03.9]  Yes    Hyperlipidemia [E78.5]  Yes    Hypokalemia [E87.6]  Yes    Essential hypertension [I10]  Yes    History of coronary artery disease [Z86.79]  Not Applicable    Choledocholithiasis [K80.50]  Unknown      Resolved Hospital Problems   No resolved problems to display.         Hospital Course  Patient was a 88 y.o. male presented with has previous medical history of dementia, history of coronary artery disease, s/p coronary artery stenting, hypertension, Parkinson disease, nursing home resident, who was admitted for choledocholithiasis and biliary pancreatitis. ERCP/EGD 2023 with sphincterotomy/sphincteroplasty and CBD stone extraction.  Medium hiatal hernia and dark-colored liquid as well as food filled most of the stomach.  There was also suspicion of aspiration at the end of the procedure. Patient also developed A.fib rvr post op as well as MILENA on CKD IIIA. Cardio, Neph. And GI followed. Resp status and mentation worsened. After further discussion with family regarding pt's status and overall poor prognosis, family decided to make pt comfort care/hospice. Pt eventually  on 2023.     Procedures Performed    Procedure(s):  ENDOSCOPIC RETROGRADE CHOLANGIOPANCREATOGRAPHY, SPHINTEROTOMY, SPHINTEROPLASTY, CLEARANCE OF BILE  DUCT WITH BALLOON (12MM-15MM, UP TO 15MM) EXTRACTION OF BILIARY STONES WITH BALLOON , OCCLUSION CHOLANGIOGRAM,  PLACEMENT OF BILIARY STENT, ESOPHAGOGASTRODUODNESCOPY  -------------------       Consults:   Consults       Date and Time Order Name Status Description    9/21/2023  4:17 PM Inpatient Cardiology Consult Completed     9/21/2023 11:00 AM Inpatient Nephrology Consult Completed     9/18/2023  7:45 AM Inpatient General Surgery Consult Completed     9/18/2023  3:28 AM Inpatient Gastroenterology Consult Completed                 Kaden Lugo DO  10/01/23  14:46 EDT

## (undated) DEVICE — TRIPLE LUMEN NEEDLE KNIFE: Brand: RX NEEDLE KNIFE XL

## (undated) DEVICE — RETRIEVAL BALLOON CATHETER: Brand: EXTRACTOR™ PRO RX

## (undated) DEVICE — DEV CLIP ENDO RESOLUTION360 CONTRL ROT 235CM: Type: IMPLANTABLE DEVICE | Status: NON-FUNCTIONAL

## (undated) DEVICE — PK ENDO GI 50

## (undated) DEVICE — BITEBLOCK ENDO W/STRAP 60F A/ LF DISP

## (undated) DEVICE — SPHINCTEROTOME: Brand: DREAMTOME™ RX 44

## (undated) DEVICE — BALLOON DILATATION CATHETER: Brand: HURRICANE™ RX

## (undated) DEVICE — SUREFIT, DUAL DISPERSIVE ELECTRODE, CONTACT QUALITY MONITOR: Brand: SUREFIT

## (undated) DEVICE — DEV POSITION LK AND BIOP CAP SYS